# Patient Record
Sex: FEMALE | Race: WHITE | NOT HISPANIC OR LATINO | Employment: OTHER | URBAN - METROPOLITAN AREA
[De-identification: names, ages, dates, MRNs, and addresses within clinical notes are randomized per-mention and may not be internally consistent; named-entity substitution may affect disease eponyms.]

---

## 2019-03-30 ENCOUNTER — ANESTHESIA (OUTPATIENT)
Dept: EMERGENCY MEDICINE | Facility: CLINIC | Age: 84
DRG: 871 | End: 2019-03-30
Payer: COMMERCIAL

## 2019-03-30 ENCOUNTER — HOSPITAL ENCOUNTER (INPATIENT)
Facility: CLINIC | Age: 84
LOS: 6 days | Discharge: NURSING, REHAB OR LONG-TERM CARE | DRG: 871 | End: 2019-04-05
Attending: EMERGENCY MEDICINE | Admitting: PEDIATRICS
Payer: COMMERCIAL

## 2019-03-30 ENCOUNTER — ANESTHESIA EVENT (OUTPATIENT)
Dept: EMERGENCY MEDICINE | Facility: CLINIC | Age: 84
DRG: 871 | End: 2019-03-30
Payer: COMMERCIAL

## 2019-03-30 ENCOUNTER — APPOINTMENT (OUTPATIENT)
Dept: GENERAL RADIOLOGY | Facility: CLINIC | Age: 84
DRG: 871 | End: 2019-03-30
Attending: EMERGENCY MEDICINE
Payer: COMMERCIAL

## 2019-03-30 ENCOUNTER — APPOINTMENT (OUTPATIENT)
Dept: GENERAL RADIOLOGY | Facility: CLINIC | Age: 84
DRG: 871 | End: 2019-03-30
Attending: NURSE ANESTHETIST, CERTIFIED REGISTERED
Payer: COMMERCIAL

## 2019-03-30 DIAGNOSIS — I48.91 ATRIAL FIBRILLATION WITH RVR (H): ICD-10-CM

## 2019-03-30 DIAGNOSIS — K21.00 GASTROESOPHAGEAL REFLUX DISEASE WITH ESOPHAGITIS: ICD-10-CM

## 2019-03-30 DIAGNOSIS — J44.1 COPD EXACERBATION (H): ICD-10-CM

## 2019-03-30 DIAGNOSIS — E87.20 LACTIC ACID ACIDOSIS: ICD-10-CM

## 2019-03-30 DIAGNOSIS — I48.0 PAROXYSMAL ATRIAL FIBRILLATION (H): ICD-10-CM

## 2019-03-30 DIAGNOSIS — J96.01 ACUTE RESPIRATORY FAILURE WITH HYPOXIA (H): ICD-10-CM

## 2019-03-30 DIAGNOSIS — I48.20 CHRONIC ATRIAL FIBRILLATION (H): ICD-10-CM

## 2019-03-30 DIAGNOSIS — Z72.0 TOBACCO USE: ICD-10-CM

## 2019-03-30 DIAGNOSIS — R19.7 DIARRHEA, UNSPECIFIED TYPE: ICD-10-CM

## 2019-03-30 DIAGNOSIS — J18.9 PNEUMONIA OF BOTH LUNGS DUE TO INFECTIOUS ORGANISM, UNSPECIFIED PART OF LUNG: ICD-10-CM

## 2019-03-30 DIAGNOSIS — K92.2 GASTROINTESTINAL HEMORRHAGE, UNSPECIFIED GASTROINTESTINAL HEMORRHAGE TYPE: Primary | ICD-10-CM

## 2019-03-30 PROBLEM — N17.9 ACUTE KIDNEY INJURY (H): Status: ACTIVE | Noted: 2019-03-30

## 2019-03-30 PROBLEM — R82.81 PYURIA: Status: ACTIVE | Noted: 2019-03-30

## 2019-03-30 PROBLEM — G93.40 ACUTE ENCEPHALOPATHY: Status: ACTIVE | Noted: 2019-03-30

## 2019-03-30 PROBLEM — I95.9 HYPOTENSION: Status: ACTIVE | Noted: 2019-03-30

## 2019-03-30 PROBLEM — R05.9 COUGH: Status: ACTIVE | Noted: 2019-03-30

## 2019-03-30 PROBLEM — I24.89 DEMAND ISCHEMIA OF MYOCARDIUM (H): Status: ACTIVE | Noted: 2019-03-30

## 2019-03-30 PROBLEM — D69.6 THROMBOCYTOPENIA (H): Status: ACTIVE | Noted: 2019-03-30

## 2019-03-30 PROBLEM — R65.10 SIRS (SYSTEMIC INFLAMMATORY RESPONSE SYNDROME) (H): Status: ACTIVE | Noted: 2019-03-30

## 2019-03-30 LAB
ALBUMIN SERPL-MCNC: 4 G/DL (ref 3.4–5)
ALBUMIN UR-MCNC: 30 MG/DL
ALP SERPL-CCNC: 72 U/L (ref 40–150)
ALT SERPL W P-5'-P-CCNC: 29 U/L (ref 0–50)
ANION GAP SERPL CALCULATED.3IONS-SCNC: 11 MMOL/L (ref 3–14)
APPEARANCE UR: ABNORMAL
APTT PPP: 34 SEC (ref 22–37)
AST SERPL W P-5'-P-CCNC: 48 U/L (ref 0–45)
BASE DEFICIT BLDV-SCNC: 0.7 MMOL/L
BASE DEFICIT BLDV-SCNC: 3.5 MMOL/L
BASOPHILS # BLD AUTO: 0 10E9/L (ref 0–0.2)
BASOPHILS NFR BLD AUTO: 0.5 %
BILIRUB SERPL-MCNC: 0.9 MG/DL (ref 0.2–1.3)
BILIRUB UR QL STRIP: NEGATIVE
BUN SERPL-MCNC: 24 MG/DL (ref 7–30)
CALCIUM SERPL-MCNC: 8.8 MG/DL (ref 8.5–10.1)
CHLORIDE SERPL-SCNC: 102 MMOL/L (ref 94–109)
CO2 SERPL-SCNC: 23 MMOL/L (ref 20–32)
COLOR UR AUTO: YELLOW
CREAT SERPL-MCNC: 1.49 MG/DL (ref 0.52–1.04)
D DIMER PPP FEU-MCNC: 0.8 UG/ML FEU (ref 0–0.5)
DIFFERENTIAL METHOD BLD: ABNORMAL
EOSINOPHIL NFR BLD AUTO: 0.2 %
ERYTHROCYTE [DISTWIDTH] IN BLOOD BY AUTOMATED COUNT: 14.5 % (ref 10–15)
FLUAV+FLUBV AG SPEC QL: NEGATIVE
FLUAV+FLUBV AG SPEC QL: NEGATIVE
GFR SERPL CREATININE-BSD FRML MDRD: 32 ML/MIN/{1.73_M2}
GLUCOSE BLDC GLUCOMTR-MCNC: 146 MG/DL (ref 70–99)
GLUCOSE BLDC GLUCOMTR-MCNC: 90 MG/DL (ref 70–99)
GLUCOSE SERPL-MCNC: 129 MG/DL (ref 70–99)
GLUCOSE UR STRIP-MCNC: NEGATIVE MG/DL
GRAN CASTS #/AREA URNS LPF: 15 /LPF
HCO3 BLDV-SCNC: 23 MMOL/L (ref 21–28)
HCO3 BLDV-SCNC: 25 MMOL/L (ref 21–28)
HCT VFR BLD AUTO: 44.2 % (ref 35–47)
HGB BLD-MCNC: 14.5 G/DL (ref 11.7–15.7)
HGB UR QL STRIP: ABNORMAL
HYALINE CASTS #/AREA URNS LPF: 44 /LPF (ref 0–2)
IMM GRANULOCYTES # BLD: 0 10E9/L (ref 0–0.4)
IMM GRANULOCYTES NFR BLD: 0.6 %
INR PPP: 1.14 (ref 0.86–1.14)
KETONES UR STRIP-MCNC: 5 MG/DL
LACTATE BLD-SCNC: 1.8 MMOL/L (ref 0.7–2)
LACTATE BLD-SCNC: 3.2 MMOL/L (ref 0.7–2)
LACTATE BLD-SCNC: 3.7 MMOL/L (ref 0.7–2)
LEUKOCYTE ESTERASE UR QL STRIP: ABNORMAL
LYMPHOCYTES # BLD AUTO: 1 10E9/L (ref 0.8–5.3)
LYMPHOCYTES NFR BLD AUTO: 16 %
MAGNESIUM SERPL-MCNC: 2 MG/DL (ref 1.6–2.3)
MCH RBC QN AUTO: 27.7 PG (ref 26.5–33)
MCHC RBC AUTO-ENTMCNC: 32.8 G/DL (ref 31.5–36.5)
MCV RBC AUTO: 85 FL (ref 78–100)
MONOCYTES # BLD AUTO: 1 10E9/L (ref 0–1.3)
MONOCYTES NFR BLD AUTO: 15.2 %
MUCOUS THREADS #/AREA URNS LPF: PRESENT /LPF
NEUTROPHILS # BLD AUTO: 4.3 10E9/L (ref 1.6–8.3)
NEUTROPHILS NFR BLD AUTO: 67.5 %
NITRATE UR QL: NEGATIVE
NRBC # BLD AUTO: 0 10*3/UL
NRBC BLD AUTO-RTO: 0 /100
NT-PROBNP SERPL-MCNC: 1047 PG/ML (ref 0–1800)
O2/TOTAL GAS SETTING VFR VENT: 21 %
O2/TOTAL GAS SETTING VFR VENT: 32 %
PCO2 BLDV: 46 MM HG (ref 40–50)
PCO2 BLDV: 47 MM HG (ref 40–50)
PH BLDV: 7.3 PH (ref 7.32–7.43)
PH BLDV: 7.35 PH (ref 7.32–7.43)
PH UR STRIP: 6 PH (ref 5–7)
PLATELET # BLD AUTO: 130 10E9/L (ref 150–450)
PO2 BLDV: 20 MM HG (ref 25–47)
PO2 BLDV: 33 MM HG (ref 25–47)
POTASSIUM SERPL-SCNC: 4.7 MMOL/L (ref 3.4–5.3)
PROCALCITONIN SERPL-MCNC: <0.05 NG/ML
PROT SERPL-MCNC: 7.8 G/DL (ref 6.8–8.8)
RBC # BLD AUTO: 5.23 10E12/L (ref 3.8–5.2)
RBC #/AREA URNS AUTO: 12 /HPF (ref 0–2)
SODIUM SERPL-SCNC: 136 MMOL/L (ref 133–144)
SOURCE: ABNORMAL
SP GR UR STRIP: 1.01 (ref 1–1.03)
SPECIMEN SOURCE: NORMAL
SQUAMOUS #/AREA URNS AUTO: 4 /HPF (ref 0–1)
T4 FREE SERPL-MCNC: 1.25 NG/DL (ref 0.76–1.46)
TROPONIN I SERPL-MCNC: 0.09 UG/L (ref 0–0.04)
TROPONIN I SERPL-MCNC: 0.46 UG/L (ref 0–0.04)
TSH SERPL DL<=0.005 MIU/L-ACNC: 12.69 MU/L (ref 0.4–4)
UROBILINOGEN UR STRIP-MCNC: 0 MG/DL (ref 0–2)
WBC # BLD AUTO: 6.3 10E9/L (ref 4–11)
WBC #/AREA URNS AUTO: 24 /HPF (ref 0–5)

## 2019-03-30 PROCEDURE — 84439 ASSAY OF FREE THYROXINE: CPT | Performed by: EMERGENCY MEDICINE

## 2019-03-30 PROCEDURE — 71046 X-RAY EXAM CHEST 2 VIEWS: CPT | Mod: TC

## 2019-03-30 PROCEDURE — 84484 ASSAY OF TROPONIN QUANT: CPT | Performed by: EMERGENCY MEDICINE

## 2019-03-30 PROCEDURE — 25000128 H RX IP 250 OP 636: Performed by: PEDIATRICS

## 2019-03-30 PROCEDURE — 85025 COMPLETE CBC W/AUTO DIFF WBC: CPT | Performed by: EMERGENCY MEDICINE

## 2019-03-30 PROCEDURE — 96367 TX/PROPH/DG ADDL SEQ IV INF: CPT

## 2019-03-30 PROCEDURE — 83880 ASSAY OF NATRIURETIC PEPTIDE: CPT | Performed by: PEDIATRICS

## 2019-03-30 PROCEDURE — 84484 ASSAY OF TROPONIN QUANT: CPT | Performed by: PEDIATRICS

## 2019-03-30 PROCEDURE — 25000132 ZZH RX MED GY IP 250 OP 250 PS 637: Performed by: FAMILY MEDICINE

## 2019-03-30 PROCEDURE — 94640 AIRWAY INHALATION TREATMENT: CPT

## 2019-03-30 PROCEDURE — 93010 ELECTROCARDIOGRAM REPORT: CPT | Mod: Z6 | Performed by: EMERGENCY MEDICINE

## 2019-03-30 PROCEDURE — 85730 THROMBOPLASTIN TIME PARTIAL: CPT | Performed by: PEDIATRICS

## 2019-03-30 PROCEDURE — 93005 ELECTROCARDIOGRAM TRACING: CPT

## 2019-03-30 PROCEDURE — 82803 BLOOD GASES ANY COMBINATION: CPT | Performed by: PEDIATRICS

## 2019-03-30 PROCEDURE — 83605 ASSAY OF LACTIC ACID: CPT | Performed by: EMERGENCY MEDICINE

## 2019-03-30 PROCEDURE — 87086 URINE CULTURE/COLONY COUNT: CPT | Performed by: EMERGENCY MEDICINE

## 2019-03-30 PROCEDURE — 82803 BLOOD GASES ANY COMBINATION: CPT | Performed by: EMERGENCY MEDICINE

## 2019-03-30 PROCEDURE — 87804 INFLUENZA ASSAY W/OPTIC: CPT | Performed by: EMERGENCY MEDICINE

## 2019-03-30 PROCEDURE — 85610 PROTHROMBIN TIME: CPT | Performed by: PEDIATRICS

## 2019-03-30 PROCEDURE — 3E043XZ INTRODUCTION OF VASOPRESSOR INTO CENTRAL VEIN, PERCUTANEOUS APPROACH: ICD-10-PCS | Performed by: PEDIATRICS

## 2019-03-30 PROCEDURE — 87081 CULTURE SCREEN ONLY: CPT | Performed by: PEDIATRICS

## 2019-03-30 PROCEDURE — 83605 ASSAY OF LACTIC ACID: CPT | Performed by: PEDIATRICS

## 2019-03-30 PROCEDURE — 96366 THER/PROPH/DIAG IV INF ADDON: CPT

## 2019-03-30 PROCEDURE — 96365 THER/PROPH/DIAG IV INF INIT: CPT

## 2019-03-30 PROCEDURE — 25800030 ZZH RX IP 258 OP 636: Performed by: PEDIATRICS

## 2019-03-30 PROCEDURE — 40000986 XR CHEST 1 VW

## 2019-03-30 PROCEDURE — 25800030 ZZH RX IP 258 OP 636: Performed by: EMERGENCY MEDICINE

## 2019-03-30 PROCEDURE — 85379 FIBRIN DEGRADATION QUANT: CPT | Performed by: PEDIATRICS

## 2019-03-30 PROCEDURE — 40000809 ZZH STATISTIC NO DOCUMENTATION TO SUPPORT CHARGE

## 2019-03-30 PROCEDURE — 20000003 ZZH R&B ICU

## 2019-03-30 PROCEDURE — 96375 TX/PRO/DX INJ NEW DRUG ADDON: CPT

## 2019-03-30 PROCEDURE — 99223 1ST HOSP IP/OBS HIGH 75: CPT | Mod: AI | Performed by: PEDIATRICS

## 2019-03-30 PROCEDURE — 80053 COMPREHEN METABOLIC PANEL: CPT | Performed by: EMERGENCY MEDICINE

## 2019-03-30 PROCEDURE — 00000146 ZZHCL STATISTIC GLUCOSE BY METER IP

## 2019-03-30 PROCEDURE — 25000125 ZZHC RX 250: Performed by: PEDIATRICS

## 2019-03-30 PROCEDURE — 99292 CRITICAL CARE ADDL 30 MIN: CPT

## 2019-03-30 PROCEDURE — 96361 HYDRATE IV INFUSION ADD-ON: CPT

## 2019-03-30 PROCEDURE — 25000128 H RX IP 250 OP 636: Performed by: EMERGENCY MEDICINE

## 2019-03-30 PROCEDURE — 83735 ASSAY OF MAGNESIUM: CPT | Performed by: EMERGENCY MEDICINE

## 2019-03-30 PROCEDURE — 87040 BLOOD CULTURE FOR BACTERIA: CPT | Performed by: EMERGENCY MEDICINE

## 2019-03-30 PROCEDURE — 40000671 ZZH STATISTIC ANESTHESIA CASE

## 2019-03-30 PROCEDURE — 84145 PROCALCITONIN (PCT): CPT | Performed by: EMERGENCY MEDICINE

## 2019-03-30 PROCEDURE — 99291 CRITICAL CARE FIRST HOUR: CPT | Mod: 25

## 2019-03-30 PROCEDURE — 96376 TX/PRO/DX INJ SAME DRUG ADON: CPT

## 2019-03-30 PROCEDURE — 84443 ASSAY THYROID STIM HORMONE: CPT | Performed by: EMERGENCY MEDICINE

## 2019-03-30 PROCEDURE — 25000125 ZZHC RX 250: Performed by: EMERGENCY MEDICINE

## 2019-03-30 PROCEDURE — 99291 CRITICAL CARE FIRST HOUR: CPT | Mod: 25 | Performed by: EMERGENCY MEDICINE

## 2019-03-30 PROCEDURE — 81001 URINALYSIS AUTO W/SCOPE: CPT | Performed by: EMERGENCY MEDICINE

## 2019-03-30 RX ORDER — SODIUM CHLORIDE 9 MG/ML
1000 INJECTION, SOLUTION INTRAVENOUS CONTINUOUS
Status: DISCONTINUED | OUTPATIENT
Start: 2019-03-30 | End: 2019-03-30

## 2019-03-30 RX ORDER — SODIUM CHLORIDE, SODIUM LACTATE, POTASSIUM CHLORIDE, CALCIUM CHLORIDE 600; 310; 30; 20 MG/100ML; MG/100ML; MG/100ML; MG/100ML
INJECTION, SOLUTION INTRAVENOUS CONTINUOUS
Status: DISCONTINUED | OUTPATIENT
Start: 2019-03-30 | End: 2019-04-01

## 2019-03-30 RX ORDER — IPRATROPIUM BROMIDE AND ALBUTEROL SULFATE 2.5; .5 MG/3ML; MG/3ML
3 SOLUTION RESPIRATORY (INHALATION) ONCE
Status: COMPLETED | OUTPATIENT
Start: 2019-03-30 | End: 2019-03-30

## 2019-03-30 RX ORDER — DILTIAZEM HYDROCHLORIDE 5 MG/ML
10 INJECTION INTRAVENOUS ONCE
Status: COMPLETED | OUTPATIENT
Start: 2019-03-30 | End: 2019-03-30

## 2019-03-30 RX ORDER — DIGOXIN 0.25 MG/ML
500 INJECTION INTRAMUSCULAR; INTRAVENOUS ONCE
Status: COMPLETED | OUTPATIENT
Start: 2019-03-30 | End: 2019-03-30

## 2019-03-30 RX ADMIN — TAZOBACTAM SODIUM AND PIPERACILLIN SODIUM 3.38 G: 375; 3 INJECTION, SOLUTION INTRAVENOUS at 10:41

## 2019-03-30 RX ADMIN — Medication 1 LOZENGE: at 22:45

## 2019-03-30 RX ADMIN — SODIUM CHLORIDE, POTASSIUM CHLORIDE, SODIUM LACTATE AND CALCIUM CHLORIDE 1000 ML: 600; 310; 30; 20 INJECTION, SOLUTION INTRAVENOUS at 10:40

## 2019-03-30 RX ADMIN — VANCOMYCIN HYDROCHLORIDE 1500 MG: 1 INJECTION, POWDER, LYOPHILIZED, FOR SOLUTION INTRAVENOUS at 11:27

## 2019-03-30 RX ADMIN — SODIUM CHLORIDE, POTASSIUM CHLORIDE, SODIUM LACTATE AND CALCIUM CHLORIDE: 600; 310; 30; 20 INJECTION, SOLUTION INTRAVENOUS at 15:58

## 2019-03-30 RX ADMIN — SODIUM CHLORIDE, POTASSIUM CHLORIDE, SODIUM LACTATE AND CALCIUM CHLORIDE: 600; 310; 30; 20 INJECTION, SOLUTION INTRAVENOUS at 13:08

## 2019-03-30 RX ADMIN — SODIUM CHLORIDE 1000 ML: 9 INJECTION, SOLUTION INTRAVENOUS at 09:26

## 2019-03-30 RX ADMIN — DILTIAZEM HYDROCHLORIDE 5 MG/HR: 5 INJECTION INTRAVENOUS at 09:48

## 2019-03-30 RX ADMIN — DIGOXIN 500 MCG: 0.25 INJECTION INTRAMUSCULAR; INTRAVENOUS at 15:57

## 2019-03-30 RX ADMIN — DILTIAZEM HYDROCHLORIDE 10 MG: 5 INJECTION INTRAVENOUS at 09:41

## 2019-03-30 RX ADMIN — NOREPINEPHRINE BITARTRATE 0.03 MCG/KG/MIN: 1 INJECTION INTRAVENOUS at 21:04

## 2019-03-30 RX ADMIN — IPRATROPIUM BROMIDE AND ALBUTEROL SULFATE 3 ML: .5; 3 SOLUTION RESPIRATORY (INHALATION) at 09:51

## 2019-03-30 RX ADMIN — TAZOBACTAM SODIUM AND PIPERACILLIN SODIUM 3.38 G: 375; 3 INJECTION, SOLUTION INTRAVENOUS at 17:29

## 2019-03-30 RX ADMIN — Medication 1 LOZENGE: at 21:55

## 2019-03-30 RX ADMIN — TAZOBACTAM SODIUM AND PIPERACILLIN SODIUM 3.38 G: 375; 3 INJECTION, SOLUTION INTRAVENOUS at 22:45

## 2019-03-30 RX ADMIN — SODIUM CHLORIDE, POTASSIUM CHLORIDE, SODIUM LACTATE AND CALCIUM CHLORIDE 1000 ML: 600; 310; 30; 20 INJECTION, SOLUTION INTRAVENOUS at 11:51

## 2019-03-30 ASSESSMENT — COPD QUESTIONNAIRES: COPD: 1

## 2019-03-30 ASSESSMENT — ENCOUNTER SYMPTOMS
CONSTIPATION: 0
WHEEZING: 1
DECREASED CONCENTRATION: 1
HEADACHES: 0
FATIGUE: 1
SHORTNESS OF BREATH: 1
NAUSEA: 0
APPETITE CHANGE: 1
POLYDIPSIA: 0
ABDOMINAL PAIN: 0
WEAKNESS: 1
FEVER: 0
VOMITING: 0
CHILLS: 1
ACTIVITY CHANGE: 1
PALPITATIONS: 0
MYALGIAS: 1
LIGHT-HEADEDNESS: 1
EYES NEGATIVE: 1
COUGH: 1
DIARRHEA: 0

## 2019-03-30 ASSESSMENT — MIFFLIN-ST. JEOR: SCORE: 1208.75

## 2019-03-30 ASSESSMENT — LIFESTYLE VARIABLES: TOBACCO_USE: 1

## 2019-03-30 ASSESSMENT — ACTIVITIES OF DAILY LIVING (ADL)
ADLS_ACUITY_SCORE: 14
ADLS_ACUITY_SCORE: 14

## 2019-03-30 NOTE — ANESTHESIA CARE TRANSFER NOTE
Patient: Gustavo Palmer    * No procedures listed *    Diagnosis: * No pre-op diagnosis entered *  Diagnosis Additional Information: No value filed.    Anesthesia Type:   No value filed.     Note:  Airway :Nasal Cannula  Patient transferred to:ICU  ICU Handoff: Call for PAUSE to initiate/utilize ICU HANDOFF, Identified Patient, Identified Responsible Provider, Reviewed the Pertinent Medical History, Discussed Surgical Course, Reviewed Intra-OP Anesthesia Management and Issues during Anesthesia, Set Expectations for Post Procedure Period and Allowed Opportunity for Questions and Acknowledgement of Understanding      Vitals: (Last set prior to Anesthesia Care Transfer)              Electronically Signed By: KRISTEN Puentes CRNA  March 30, 2019  4:12 PM

## 2019-03-30 NOTE — ANESTHESIA POSTPROCEDURE EVALUATION
Patient: Gustavo Palmer    * No procedures listed *    Diagnosis:* No pre-op diagnosis entered *  Diagnosis Additional Information: No value filed.    Anesthesia Type:  No value filed.    Note:  Anesthesia Post Evaluation    Patient location during evaluation: ICU  Patient participation: Able to fully participate in evaluation  Level of consciousness: awake and alert  Pain management: satisfactory to patient  Airway patency: patent  Cardiovascular status: stable  Respiratory status: nasal cannula  Hydration status: stable  PONV: none     Anesthetic complications: None    Comments: Appear to have tolerated Central line placement well without noted incident.        Last vitals:  Vitals:    03/30/19 1530 03/30/19 1545 03/30/19 1600   BP: 107/63 117/76    Pulse: 107 135 138   Resp: 24 29 24   Temp:   99.1  F (37.3  C)   SpO2:            Electronically Signed By: KRISTEN Puentes CRNA  March 30, 2019  4:13 PM

## 2019-03-30 NOTE — ED NOTES
ED Nursing criteria listed below was addressed during verbal handoff:     Abnormal vitals: Yes  Abnormal results: Yes  Med Reconciliation completed: Yes  Meds given in ED: Yes  Any Overdue Meds: No  Core Measures: Yes  Isolation: Yes  Special needs: Yes  Skin assessment: Yes    Observation Patient  Education provided: N/A

## 2019-03-30 NOTE — PROGRESS NOTES
S: Patient arrives to room 215 via cart from ED    B: Rapid atrial fibrillation, SIRS    A: Alert and oriented, cooperative. Monitor shows A Fib, -140, HR decreases to SR 50-60s at times then back into A Fib. MD aware. Skin warm and dry. BP stable, Levophed not started at this time, MAP > 65.     R: Orders reviewed with patient. Will monitor patient per MD orders.     Inpatient nursing criteria listed below were met:    Health care directives status obtained and documented: Yes  SCD's Documented: Yes  Skin issues/needs documented:Yes  Isolation needs addressed, if appropriate: Yes  Fall Prevention: Care plan updated, Education given and documented Yes  MRSA swab completed for patient 55 years and older (exclude POLLY and TKA): Yes  Care Plan initiated: Yes  Education Assessment documented:Yes  Education Documented (Pre-existing chronic infection such as, MRSA/VRE need education on admission): Yes  Admission Medication Reconciliation completed: Yes  New medication patient education completed and documented (Possible Side Effects of Common Medications handout): Yes  Home medications if not able to send immediately home with family stored here: NA  Reminder note placed in discharge instructions: NA  Discharge planning review completed (admission navigator) Yes

## 2019-03-30 NOTE — ED NOTES
Pt boosted and turned onto side. Pt states she is feeling a little more comfortable and better. No requests at this time.

## 2019-03-30 NOTE — ED NOTES
DATE:  3/30/2019   TIME OF RECEIPT FROM LAB:  0955  LAB TEST:  Lactic Acid  LAB VALUE:  3.7  RESULTS GIVEN WITH READ-BACK TO (PROVIDER):  Provider and primary RN  TIME LAB VALUE REPORTED TO PROVIDER:   0956

## 2019-03-30 NOTE — SIGNIFICANT EVENT
Significant Event Note    Time of event: 6:15 PM March 30, 2019    Description of event:  Notified of elevated troponin at 0.459, increased from 0.092 at 9:27 AM today.    Patient has been admitted to the ICU and had a central venous catheter placed.  Her hemodynamic status has improved maintaining normal blood pressures without having started vasopressor therapy.  CVP reading is pending.  She has not had any anginal pain.  Repeat EKG this afternoon demonstrates persistent atrial fibrillation with improved rate control and resolved ST segment depression.  Her respiratory status and oxygenation are stable on supplemental oxygen.  Rate of atrial fibrillation has improved with few episodes of brief sinus rhythm transiently through the afternoon after she was administered 1 dose IV digoxin earlier.  BNP is pending.  D-dimer was minimally elevated with low clinical suspicion for pulmonary embolism.    Suspect rising troponin is due to demand myocardial ischemia associated with a combination of atrial fibrillation with rapid ventricular response, severe hypoxemia, and protracted hypotension earlier today.  An acute coronary syndrome is not suspected at this time.    Plan:  Monitor serial troponin until decreasing.  No change in care plan at this time unless there is a change in her clinical status.  Reconsider additional diagnostic evaluation for pulmonary embolism if there is increasing clinical suspicion for such, but favor avoiding use of IV contrast at this time because of her impaired renal function and protracted hypotension earlier today.  Reconsider starting IV amiodarone to optimize rate control of rapid atrial fibrillation if atrial fibrillation is worsening.  Reconsider starting appropriate therapy for an acute coronary syndrome if there is increasing clinical suspicion for such.    Discussed with: bedside nurse    Marc Adan MD

## 2019-03-30 NOTE — PROGRESS NOTES
DATE: 3/30/2019    TIME OF RECEIPT FROM LAB:  1815  LAB TEST:  Troponin   LAB VALUE:  0.459  RESULTS GIVEN WITH READ-BACK TO:  Dr. Adan  TIME LAB VALUE REPORTED TO PROVIDER:   9117  NEW ORDERS: No

## 2019-03-30 NOTE — H&P
[facility]    History and Physical - Hospitalist Service       Date of Admission:  3/30/2019    Assessment & Plan   Gustavo Palmer is a 85 year old female admitted on 3/30/2019. She presents with symptoms, signs, and test results consistent with life-threatening acute illness with systemic inflammatory response syndrome and rapid atrial fibrillation.  She is persistently hypotensive after fluid resuscitation with 40 mL/kg in the emergency room with signs of tissue hypoxia including elevated lactic acid level and end organ dysfunction including acute respiratory failure with hypoxia, acute kidney injury, acute encephalopathy, and myocardial demand ischemia.  This raises suspicion for shock.  Although a source for infection is not currently identified with certainty, she has had recent cough and has pyuria on UA raising clinical concern for sepsis due either to respiratory tract infection or urinary tract infection.  Alternatively, rapid atrial fibrillation with cardiogenic shock is a consideration although hypotension persists despite improved heart rate.  She is at high risk for an adverse outcome including worsening signs of shock and death, so hospitalization is considered medically necessary.  Based on the presently available information, hospitalization for at least 2 midnights is anticipated.    Active Problems:    Rapid atrial fibrillation (H)  Assessment: Presented with initial heart rate 170-180 well-appearing hypovolemic, heart rate improved after ER treatment including IV diltiazem bolus and infusion and IV fluid resuscitation, remains severely hypotensive even as heart rate has improved with hypotension that did not resolve after discontinuation of IV diltiazem infusion, could have associated cardiogenic shock or non-ST elevation MI although she does not have anginal pain, could also have associated acute pulmonary edema although this was not radiographically evident, no  previous history of atrial fibrillation, DBIDC-0-xphf score is 3  Plan:  ICU admission, administer 1 dose IV digoxin, consider IV amiodarone bolus and infusion if rapid atrial fibrillation persists and she remains hypotensive, reconsider other medications for rate control once she stabilizes hemodynamically, echocardiogram April 1 when available at this facility, reconsider anticoagulation once she is more stable clinically      SIRS (systemic inflammatory response syndrome) (H) - tachycardia, tachypnea, elevated lactate  Assessment: Recent chills and presenting with tachycardia, tachypnea and persistent hypotension but afebrile so far with normal WBC, lactic acid elevated, no definite source for infection but is presenting with cough and is noted to have pyuria on UA  Plan: ICU admission, empiric broad-spectrum parenteral antibiotics with IV vancomycin and Zosyn for possible infection while awaiting culture results and pro-calcitonin, continue IV fluids, place central venous catheter as discussed with CRNA today, start vasopressors for treatment of persistent hypotension refractory to initial IV fluid resuscitation 40 mL/kg in the ER and titrate to keep mean arterial pressure greater than 65      Cough  Assessment: Persistent over the last week with associated dyspnea, no radiographic infiltrate apparent but is probably hypovolemic, pneumonia is possible but she is also an active smoker and could have cough from smoking or bronchitis  Plan: Empiric antibiotics      Pyuria  Assessment: No recent symptoms suspicious for urinary tract infection but does appear to have significant pyuria and in the context of Sirs with persistent hypotension, UTI with sepsis is possible  Plan: Empiric parenteral antibiotics while awaiting urine culture results      Acute kidney injury (H)  Assessment: Creatinine 1.49 today, increased from 0.81 in 2016 and consistent with acute kidney injury, could be due to prerenal azotemia but she  could also have acute kidney injury from sepsis  Plan: IV fluids, follow urine output and renal function closely, allow to void spontaneously for now but low threshold to Place Ortiz catheter to monitor urine output closely      Acute encephalopathy  Assessment: Presented to the ER with confusion today and continues to have fluctuating level of consciousness consistent with an acute encephalopathy in this clinical context, no focal neurologic deficits so doubt stroke, no seizure activity  Plan: Close clinical monitoring, additional neurologic evaluation if indicated      Acidosis  Assessment: Mildly low venous pH is 7.30 with normal bicarbonate and normal PCO2 on blood gas, could be a relative early respiratory acidosis associated with respiratory failure or metabolic acidosis associated with tissue hypoxia  Plan: Provide respiratory support and hemodynamic support to improve tissue perfusion and follow acid base status closely      Acute respiratory failure with hypoxia (H)  Assessment: Severely hypoxic with respiratory distress consistent with acute respiratory failure, possibly due to respiratory tract infection, not clinically demonstrating significant wheezing at this time, could also be due to systemic inflammatory response syndrome and sepsis  Plan: Oxygen supplementation with low threshold to add assisted ventilation if worsening      Demand ischemia of myocardium (H)  Assessment: Slightly elevated troponin with ST segment depression on EKG consistent with myocardial ischemia although she has no anginal pain nor does she have any history of ischemic heart disease, suspect demand ischemia due to severe acute illness with tissue hypoxia and severe hypotension  Plan: Follow troponin until stabilizing, add additional therapy for treatment of an acute coronary syndrome if there is increasing clinical suspicion for it      Tobacco use  Assessment: Chronic and ongoing, increases her risk for COPD although she does  not carry a formal diagnosis of same  Plan: Bronchodilators as needed for wheezing      Thrombocytopenia (H)  Assessment: Chronic and currently stable compared with 2016, no apparent bleeding  Plan: Follow platelet count       Diet: Clear liquid diet for now  DVT Prophylaxis: Pneumatic Compression Devices  Ortiz Catheter: not present  Code Status: Full resuscitation according to advance directives, not specifically reviewed with the patient herself today due to her abnormal mentation    Disposition Plan   Expected discharge: 4 - 7 days, recommended to To be determined once SIRS/Sepsis treated.  Entered: Marc Adan MD 03/30/2019, 1:51 PM     The patient's care was discussed with the Patient.    Marc Adan MD  [facility]    ______________________________________________________________________    Chief Complaint   Worsening shortness of breath    History is obtained from the patient and the ER physician Dr. Bartlett    History of Present Illness   Gustavo Palmer is a 85 year old female who noted onset of cough and shortness of breath 1 week ago on March 24.  Cough has been intermittently productive of clear phlegm.  Shortness of breath was initially mild.  Over the past week, cough has persisted and shortness of breath has progressively worsened.  Shortness of breath is worse with activity and improves with rest.  Aside from resting, there is nothing else specific she has tried to relieve symptoms of dyspnea.  She has had associated chills at home but is not sure if she has had fever.  Dyspnea became so severe today that she presented to the emergency room for evaluation.  In the emergency room, she was noted to be hypoxemic and started on oxygen.  She was also noted to be tachycardic with a regular rhythm and was given a dose of 10 mg IV diltiazem followed by continuous IV diltiazem titrated to 7.5 mg/h.  She was hypotensive and has received a total of 40 mL's per kilogram IV  fluid resuscitation in the ER.  According to Dr. Bartlett in the ER, she has been persistently hypotensive despite fluid resuscitation.  Her diltiazem infusion was discontinued when her heart rate improved to 110-120 but her blood pressure failed to improve after discontinuation of diltiazem infusion.  Dr. Bartlett reports that the patient was confused when she arrived to the ER today and that she did not appear well perfused and appeared ill but that she has since improved.  Patient says she does feel slightly better after treatment she has received in the ER.    Review of Systems    CONSTITUTIONAL:  positive for chills and malaise  negative for  fevers and sweats  EYES:  negative for  eye discharge and visual disturbance  HEENT:  positive for  nasal congestion and rhinorrhea,  negative for  sore throat  RESPIRATORY:  positive for  cough with sputum and dyspnea  negative for  pleuritic pain and cyanosis  CARDIOVASCULAR:  negative for  chest pain, palpitations, orthopnea, PND, edema, syncope  GASTROINTESTINAL:  positive for markedly decreased appetite at home this week,  negative for nausea, vomiting, change in bowel habits and abdominal pain  GENITOURINARY:  negative for frequency, dysuria, hesitancy and hematuria  INTEGUMENT/BREAST:  negative for rash  HEMATOLOGIC/LYMPHATIC:  negative for bleeding  ALLERGIC/IMMUNOLOGIC:  negative for drug reactions  ENDOCRINE:  negative for diabetic symptoms including polyuria and polydipsia  MUSCULOSKELETAL:  negative for  myalgias, arthralgias and joint swelling  NEUROLOGICAL:  positive for dizziness with getting up and generalized weakness  negative for speech problems and coordination problems    Past Medical History    I have reviewed this patient's medical history and updated it with pertinent information if needed.   Past Medical History:   Diagnosis Date     Unspecified hemorrhoids without mention of complication     Hemorrhoids     Unspecified nonsenile cataract       Patient denies any history of heart disease including dysrhythmia, ischemic heart disease, or heart failure.    She reports that she had an episode of pneumonia years ago.  She has not had recurring UTIs.    Past Surgical History   I have reviewed this patient's surgical history and updated it with pertinent information if needed.  Past Surgical History:   Procedure Laterality Date     C TOTAL HIP ARTHROPLASTY  01/26/10    Right     HC REMV CATARACT EXTRACAP,INSERT LENS  2/17/2004    Right     HC REMV CATARACT EXTRACAP,INSERT LENS  3/16/2004    Left       Social History   I have reviewed this patient's social history and updated it with pertinent information if needed.  Social History     Tobacco Use     Smoking status: Current Every Day Smoker     Packs/day: 1.00     Years: 40.00     Pack years: 40.00     Smokeless tobacco: Never Used   Substance Use Topics     Alcohol use: Yes     Comment: occ wine     Drug use: No     Her daughter was diagnosed with influenza A today.    Family History   I have reviewed this patient's family history and updated it with pertinent information if needed.   Family History   Problem Relation Age of Onset     Diabetes Brother         x2 brothers     Cancer Mother         lung cancer       Prior to Admission Medications   None   She takes no prescription medications normally.      Allergies   Allergies   Allergen Reactions     No Known Drug Allergies        Physical Exam   Vital Signs: Temp: 98.2  F (36.8  C) Temp src: Oral BP: (!) 79/57 Pulse: 118 Heart Rate: 140 Resp: 20 SpO2: 94 % O2 Device: Nasal cannula Oxygen Delivery: 2 LPM  Weight: 165 lbs 0 oz     Blood pressure changes from 77/47 with mean arterial pressure 58 to 94/60 with mean arterial pressure 78 with passive elevation of the legs to above with heart level while she is supine in bed but heart rate actually increases from 109 to 140 after elevation of her legs    General Appearance: Ill-appearing elderly woman lying  supine in bed  Eyes: Anicteric sclerae, clear conjunctivae, symmetric pupils  HEENT: Congested nares without obvious drainage, clear ear canals, normal oropharynx without mucosal lesions  Neck: Trachea midline, symmetric, nontender, no jugular vein distention, no stridor, normal thyroid  Respiratory: Borderline tachypneic with otherwise normal respiratory effort, able to speak in full sentences, diminished breath sounds throughout, clear lung fields currently without wheezing  Cardiovascular: Tachycardic with irregularly irregular rhythm, palpable radial pulse with brisk capillary refill in the fingertips, no murmur or gallop appreciated  GI: Nondistended abdomen, soft, no abdominal tenderness  Lymph/Hematologic: No cervical or supraclavicular lymphadenopathy  Skin: Normal color, no rashes  Musculoskeletal: No acutely inflamed joints, no angioedema, no pitting edema or cords in the lower extremities  Neurologic: Fluctuating level of consciousness alternating between somnolence and wakefulness, opens eyes to voice, speech is normal when she is awake, no obvious focal neurologic deficits, no apparent involuntary movements, able to move all limbs purposefully and symmetrically  Psychiatric: Normal mood and affect when alert    Data   Data reviewed today: I reviewed all medications, new labs and imaging results over the last 24 hours. I personally reviewed the EKG tracing showing Probable atrial fibrillation with rapid ventricular response heart rate approximately 180, diffuse ST segment depression.    Recent Labs   Lab 03/30/19  0927   WBC 6.3   HGB 14.5   MCV 85   *      POTASSIUM 4.7   CHLORIDE 102   CO2 23   BUN 24   CR 1.49*   ANIONGAP 11   TONY 8.8   *   ALBUMIN 4.0   PROTTOTAL 7.8   BILITOTAL 0.9   ALKPHOS 72   ALT 29   AST 48*   TROPI 0.092*     Initial lactic acid 3.7 in the ER with repeat lactic acid 3.2 after fluid resuscitation  Venous blood gas pH 7.30, PCO2 47, bicarbonate 23  Troponin 0  0.092    Urinalysis positive for moderate blood, small ketones, and moderate leukocyte esterase with 24 WBC, 12 RBC, and 15 granular casts on microscopic    Blood and urine cultures are pending, pro calcitonin level is pending      Recent Results (from the past 24 hour(s))   XR Chest 2 Views    Narrative    CHEST TWO VIEWS    3/30/2019 10:16 AM     HISTORY: Productive cough, sepsis workup    COMPARISON: Chest CT 8/5/2016, chest x-ray 8/5/2016      Impression    IMPRESSION: No acute abnormality. Lungs are hyperinflated, but clear.  No new opacities are identified. Heart size is unchanged. Thoracic  aorta is tortuous, unchanged.    MCKAY FARRELL MD          VDOFH-9-Pyry Score  (calculator)  Background  Calculates overall risk of atrial fibrillation related CVA based on 7 factors: Age>=65-75, CHF, Htn, CVA/TIA, DM, vascular disease, female  Data  85 year old year old female  has Primary localized osteoarthrosis, pelvic region and thigh; CARDIOVASCULAR SCREENING; LDL GOAL LESS THAN 130; Impaired fasting glucose; Advanced directives, counseling/discussion; Health Care Home; Rapid atrial fibrillation (H); SIRS (systemic inflammatory response syndrome) (H) - tachycardia, tachypnea, elevated lactate; Cough; Pyuria; Tobacco use; Acute kidney injury (H); Acute encephalopathy; Acidosis; Thrombocytopenia (H); Acute respiratory failure with hypoxia (H); and Demand ischemia of myocardium (H) on their problem list.  Criteria   Of possible 6 points for 5 possible items  2 point: Age over 75 years (1 point over 65 years)  1 point: Female    Interpretation  MFCQZ-7-Eylp Score: 3  CHADS Score 2+ (CVA risk >5% per year): Warfarin with goal INR 2.0 to 3.0

## 2019-03-30 NOTE — ANESTHESIA PROCEDURE NOTES
CENTRAL LINE INSERTION PROCEDURE NOTE:   Pre-Procedure  Performed by  Stephan Kaufman APRN CRNA   Location: ICU      Pre-Anesthestic Checklist: patient identified, risks and benefits discussed, informed consent, monitors and equipment checked, pre-op evaluation and at physician/surgeon's request    Timeout  Correct Patient: Yes   Correct Procedure: Yes   Correct Site: Yes   Correct Laterality: N/A   Correct Position: Yes   Site Marked: N/A   .   Procedure Documentation   Procedure: central line, new line and elective  Position: supine  Patient Prep;all elements of maximal sterile barrier technique followed, mask, hat, sterile gown, sterile gloves, draped, hand hygiene, chlorhexidine gluconate and isopropyl alcohol, patient draped  Diagnosis:Need for central venous access / hemodynamic monitoring    Insertion Site:internal jugular, right    Skin infiltrated with 1 mL of 1% lidocaine.  Catheter: 7 Fr, 20 cm, T.L.  Assessment/Narrative  Complications: hematoma       Secured by suture  Tegaderm and Biopatch dressing used.  blood aspirated from all lumens  All lumens flushed: Yes  Verification method: X-ray and Placement to be verified post-opPerson verifying: Consulting Physician  Comments:  1st attempt unable to pass j-wire.  Needle/j-wire removed as a unit and pressure applied to site as immediate swelling noted.  Difficult placement re:swelling and depressed lumen noted vs pt quite dry and only dilated with pt valsalva.  Passed 15 cm and excellent heme aspiration all 3 lumens and flushed all lumens without noted incident.  Pt appear to tolerate well.

## 2019-03-30 NOTE — CONSULTS
Pharmacy Vancomycin Initial Note  Date of Service 2019  Patient's  6/15/1933  85 year old, female    Indication: Sepsis    Current estimated CrCl = CrCl cannot be calculated (Unknown ideal weight.).    Creatinine for last 3 days  3/30/2019:  9:27 AM Creatinine 1.49 mg/dL    Recent Vancomycin Level(s) for last 3 days  No results found for requested labs within last 72 hours.      Vancomycin IV Administrations (past 72 hours)      No vancomycin orders with administrations in past 72 hours.                Nephrotoxins and other renal medications (From now, onward)    Start     Dose/Rate Route Frequency Ordered Stop    19 1015  vancomycin (VANCOCIN) 1,500 mg in sodium chloride 0.9 % 250 mL intermittent infusion      1,500 mg  over 90 Minutes Intravenous EVERY 24 HOURS 19 1014      19 0959  piperacillin-tazobactam (ZOSYN) infusion 3.375 g      3.375 g  100 mL/hr over 30 Minutes Intravenous ONCE 19 0958            Contrast Orders - past 72 hours (72h ago, onward)    None                Plan:  1.  Start vancomycin  1500 mg IV q24h.   2.  Goal Trough Level: 15-20 mg/L   3.  Pharmacy will check trough levels as appropriate in 1-3 Days.    4. Serum creatinine levels will be ordered daily for the first week of therapy and at least twice weekly for subsequent weeks.    5. [place] method utilized to dose vancomycin therapy: Method 2    Gil Amaya

## 2019-03-30 NOTE — ED PROVIDER NOTES
History     Chief Complaint   Patient presents with     Shortness of Breath     HPI  Gustavo Palmer is a 85 year old female who presents with generalized weakness, fatigue, shortness of breath.  Patient seldom sees physicians.  Last hospitalization years ago for a total hip arthroplasty.  Patient reports over the last week and a half that she has had progressive fatigue with increased productive cough.  She acknowledges she smokes naproxen 1 pack of cigarettes per day and has done so since her teenage years.  She has had 20 pound weight loss in the last 6-9 months.  Reports recent chills but has not had any night sweats associated weight loss.  Denies any hemoptysis.  Her cough at times is productive but she states she can clear phlegm with coughing and just rattles in her chest.  She has had no chest discomfort or recognized palpitations.  She reports she had a very poor appetite she is only been consuming saltine crackers.  She denies abdominal pain.  She has had no vomiting or diarrhea.  Family members are ill with bronchitis.    Allergies:  Allergies   Allergen Reactions     No Known Drug Allergies        Problem List:    Patient Active Problem List    Diagnosis Date Noted     Health Care Home 10/08/2015     Priority: Medium     No longer active with [facility] case management effective 10/01/15.         Advanced directives, counseling/discussion 06/11/2015     Priority: Medium     Advance Care Planning 6/11/2015: Receipt of ACP document:  Received: Health Care Directive which was witnessed or notarized on 6/4/98.  Document not previously scanned.  Validation form completed and sent with document to be scanned.  Code Status reflects choices in most recent ACP document.  Confirmed/documented designated decision maker(s).  Added by Frandy Wall             Impaired fasting glucose 05/18/2015     Priority: Medium     CARDIOVASCULAR SCREENING; LDL GOAL LESS THAN 130 10/31/2010     Priority:  Medium     Primary localized osteoarthrosis, pelvic region and thigh 01/06/2010     Priority: Medium        Past Medical History:    Past Medical History:   Diagnosis Date     Unspecified hemorrhoids without mention of complication      Unspecified nonsenile cataract        Past Surgical History:    Past Surgical History:   Procedure Laterality Date     C TOTAL HIP ARTHROPLASTY  01/26/10    Right     HC REMV CATARACT EXTRACAP,INSERT LENS  2/17/2004    Right     HC REMV CATARACT EXTRACAP,INSERT LENS  3/16/2004    Left       Family History:    Family History   Problem Relation Age of Onset     Diabetes Brother         x2 brothers     Cancer Mother         lung cancer       Social History:  Marital Status:   [5]  Social History     Tobacco Use     Smoking status: Current Every Day Smoker     Packs/day: 1.00     Years: 40.00     Pack years: 40.00     Smokeless tobacco: Never Used   Substance Use Topics     Alcohol use: Yes     Comment: occ wine     Drug use: No        Medications:      No current outpatient medications on file.      Review of Systems   Constitutional: Positive for activity change, appetite change, chills and fatigue. Negative for fever.   HENT: Positive for hearing loss.    Eyes: Negative.    Respiratory: Positive for cough, shortness of breath and wheezing.    Cardiovascular: Negative for chest pain, palpitations and leg swelling.   Gastrointestinal: Negative for abdominal pain, constipation, diarrhea, nausea and vomiting.   Endocrine: Negative for polydipsia.   Genitourinary: Positive for decreased urine volume.   Musculoskeletal: Positive for myalgias.   Skin: Negative.    Neurological: Positive for weakness and light-headedness. Negative for headaches.   Psychiatric/Behavioral: Positive for decreased concentration.       Physical Exam   BP: 94/67  Heart Rate: 57  Temp: 98.2  F (36.8  C)  Resp: 24  Weight: 74.8 kg (165 lb)  SpO2: 95 %      Physical Exam   Constitutional: She is oriented to  person, place, and time. Vital signs are normal. She does not appear ill. No distress.   HENT:   Head: Normocephalic and atraumatic.   Right Ear: External ear normal.   Left Ear: External ear normal.   Nose: Nose normal.   Mouth/Throat: Mucous membranes are normal. No oropharyngeal exudate.   Dry oral mucous membranes   Eyes: Conjunctivae, EOM and lids are normal. Pupils are equal, round, and reactive to light. No scleral icterus.   Fundoscopic exam:       The right eye shows no hemorrhage.        The left eye shows no hemorrhage.   Neck: Trachea normal. Neck supple. No JVD present. Carotid bruit is not present. No thyromegaly present.   Cardiovascular: S1 normal, S2 normal and intact distal pulses. An irregularly irregular rhythm present.  No extrasystoles are present. Tachycardia present.   No murmur heard.  Heart rate was not picking up while in the cardiac monitor.  By pulse palpation appear to be greater than 120 bpm.   Pulmonary/Chest: Tachypnea noted. She has decreased breath sounds. She has wheezes. She has rhonchi. She has no rales.   Abdominal: Soft. Bowel sounds are normal. She exhibits no distension. There is no splenomegaly or hepatomegaly. There is no tenderness. There is no rebound. No hernia.   Musculoskeletal: Normal range of motion.   Lower extremities show no edema.  No calf tenderness with negative Homans test bilateral.   Lymphadenopathy:     She has no cervical adenopathy.   Neurological: She is alert and oriented to person, place, and time. She has normal strength and normal reflexes. No sensory deficit. She exhibits normal muscle tone.   Skin: Skin is warm and dry. Capillary refill takes 2 to 3 seconds. No rash noted. She is not diaphoretic. No pallor.   Psychiatric: She has a normal mood and affect. Her speech is normal and behavior is normal. Cognition and memory are normal.   Nursing note and vitals reviewed.      ED Course        Procedures          EKG:  Interpretation by Artemio Bartlett  DO.   Indication: Dyspnea with fatigue  Atrial fibrillation with rapid ventricular response.  Heart rate =170 bpm.  Diffuse nonspecific T abnormalities and ST segment depression.  Cannot rule out subendocardial injury or ischemia.  Impressions: Abnormal EKG with no comparison available.      The Lactic acid level is elevated due to sepsis, at this time there is no sign of severe sepsis or septic shock.         Results for orders placed or performed during the hospital encounter of 03/30/19   XR Chest 2 Views    Narrative    CHEST TWO VIEWS    3/30/2019 10:16 AM     HISTORY: Productive cough, sepsis workup    COMPARISON: Chest CT 8/5/2016, chest x-ray 8/5/2016      Impression    IMPRESSION: No acute abnormality. Lungs are hyperinflated, but clear.  No new opacities are identified. Heart size is unchanged. Thoracic  aorta is tortuous, unchanged.    MCKAY FARRELL MD   CBC with platelets differential   Result Value Ref Range    WBC 6.3 4.0 - 11.0 10e9/L    RBC Count 5.23 (H) 3.8 - 5.2 10e12/L    Hemoglobin 14.5 11.7 - 15.7 g/dL    Hematocrit 44.2 35.0 - 47.0 %    MCV 85 78 - 100 fl    MCH 27.7 26.5 - 33.0 pg    MCHC 32.8 31.5 - 36.5 g/dL    RDW 14.5 10.0 - 15.0 %    Platelet Count 130 (L) 150 - 450 10e9/L    Diff Method Automated Method     % Neutrophils 67.5 %    % Lymphocytes 16.0 %    % Monocytes 15.2 %    % Eosinophils 0.2 %    % Basophils 0.5 %    % Immature Granulocytes 0.6 %    Nucleated RBCs 0 0 /100    Absolute Neutrophil 4.3 1.6 - 8.3 10e9/L    Absolute Lymphocytes 1.0 0.8 - 5.3 10e9/L    Absolute Monocytes 1.0 0.0 - 1.3 10e9/L    Absolute Basophils 0.0 0.0 - 0.2 10e9/L    Abs Immature Granulocytes 0.0 0 - 0.4 10e9/L    Absolute Nucleated RBC 0.0    Comprehensive metabolic panel   Result Value Ref Range    Sodium 136 133 - 144 mmol/L    Potassium 4.7 3.4 - 5.3 mmol/L    Chloride 102 94 - 109 mmol/L    Carbon Dioxide 23 20 - 32 mmol/L    Anion Gap 11 3 - 14 mmol/L    Glucose 129 (H) 70 - 99 mg/dL    Urea  Nitrogen 24 7 - 30 mg/dL    Creatinine 1.49 (H) 0.52 - 1.04 mg/dL    GFR Estimate 32 (L) >60 mL/min/[1.73_m2]    GFR Estimate If Black 37 (L) >60 mL/min/[1.73_m2]    Calcium 8.8 8.5 - 10.1 mg/dL    Bilirubin Total 0.9 0.2 - 1.3 mg/dL    Albumin 4.0 3.4 - 5.0 g/dL    Protein Total 7.8 6.8 - 8.8 g/dL    Alkaline Phosphatase 72 40 - 150 U/L    ALT 29 0 - 50 U/L    AST 48 (H) 0 - 45 U/L   Lactic acid whole blood   Result Value Ref Range    Lactic Acid 3.7 (H) 0.7 - 2.0 mmol/L   Troponin I   Result Value Ref Range    Troponin I ES 0.092 (H) 0.000 - 0.045 ug/L   Blood gas venous   Result Value Ref Range    Ph Venous 7.30 (L) 7.32 - 7.43 pH    PCO2 Venous 47 40 - 50 mm Hg    PO2 Venous 20 (L) 25 - 47 mm Hg    Bicarbonate Venous 23 21 - 28 mmol/L    Base Deficit Venous 3.5 mmol/L    FIO2 21    Pharmacy to dose vancomycin    Narrative    Gil Amaya, [facility]     3/30/2019 10:14 AM  Pharmacy Vancomycin Initial Note  Date of Service 2019  Patient's  6/15/1933  85 year old, female    Indication: Sepsis    Current estimated CrCl = CrCl cannot be calculated (Unknown ideal   weight.).    Creatinine for last 3 days  3/30/2019:  9:27 AM Creatinine 1.49 mg/dL    Recent Vancomycin Level(s) for last 3 days  No results found for requested labs within last 72 hours.      Vancomycin IV Administrations (past 72 hours)      No vancomycin orders with administrations in past 72 hours.                Nephrotoxins and other renal medications (From now, onward)    Start     Dose/Rate Route Frequency Ordered Stop    19 1015  vancomycin (VANCOCIN) 1,500 mg in sodium chloride   0.9 % 250 mL intermittent infusion      1,500 mg  over 90 Minutes Intravenous EVERY 24 HOURS 19 1014      19 0959  piperacillin-tazobactam (ZOSYN) infusion 3.375 g        3.375 g  100 mL/hr over 30 Minutes Intravenous ONCE 19 0958            Contrast Orders - past 72 hours (72h ago, onward)    None                Plan:  1.  Start  vancomycin  1500 mg IV q24h.   2.  Goal Trough Level: 15-20 mg/L   3.  Pharmacy will check trough levels as appropriate in 1-3 Days.      4. Serum creatinine levels will be ordered daily for the first   week of therapy and at least twice weekly for subsequent weeks.    5. [place] method utilized to dose vancomycin therapy: Method 2    Gil Amaya       Influenza A/B antigen   Result Value Ref Range    Influenza A/B Agn Specimen Nasopharyngeal     Influenza A Negative NEG^Negative    Influenza B Negative NEG^Negative         Medications   0.9% sodium chloride BOLUS (not administered)     Followed by   sodium chloride 0.9% infusion (not administered)       Assessments & Plan (with Medical Decision Making)  85-year-old female presents with generalized weakness, fatigue, productive cough over the last 10 days.  Weight loss of 20 pounds over the last 6-9 months.  Poor appetite.  Examination noted patient to have a soft blood pressure BP = 94/67.  Pulse = 57.  Temperature = 98.2.  Oxygen saturation room air 95%.  Patient had dry oral mucous membranes.  Mentation normal.  Some hard of hearing.  Heart was irregularly irregular suggestive of atrial fibrillation.  No noted ectopy.  Her lungs noted to have greatly diminished breath sounds throughout.  Scattered rhonchi with straight wheezing present.  Findings appear to be consistent with COPD/exacerbation.   Plan/Problem list:  #1.  COPD with ongoing tobacco abuse disorder rule out pneumonia-versus COPD exacerbation-chest x-ray two-view, venous gas  #2.  Dehydration-start IV fluids  #3.  Irregular irregular cardiac rhythm-suspect atrial fibrillation-EKG pending/patient on a cardiac monitor  #4.  Sepsis screening -CBC, CMP, lactate  #5.  Elevated troponin without EKG showing acute myocardial infarction/ACS.  Suspect demand ischemia from A. fib RVR  9:59 AM  Elevated lactate confirmed (3.7).  Initiate sepsis protocol for unknown source.  Switch to LR with 30 cc/kg bolus  and antibiotic therapy with Zosyn and vancomycin.  11:31 AM  Call placed to hospitalist ( Dr. Adan).  Current vital signs show BP = 86/60.  Oxygen saturation =90% on room air.  Heart rate = 120-130 range.  Chest x-ray reviewed and shows no acute pulmonary process suggestive for infection.  UA UC is pending.  Blood cultures pending.  Diltiazem increased to 7.5 mg/h.  Reassessing if we need to give additional IV fluids after the 30 cc/kg bolus.  She did urinate in moderate amounts suggesting improved endorgan perfusion.  Skin color and tone looks improved.  Mentation improved.  11:47 AM  Dr. Adan excepted patient to the intensive care unit.  Reassessing patient she still having a heart rate at times between 120-140 bpm with persistent hypotension.  Lung bases are clear chest x-ray does not show any signs for CHF.  I believe she needs additional fluids before we consider pressor therapy.  She has received 30 cc/kg.  Added additional 1 L fluids and then will reassess.  Second lactate is being drawn at this time.  IV antibiotics are infusing.  Dr. Adan suggested considering either amiodarone or digoxin for rate control she is not responding to diltiazem.  11:50 AM  Critical care documentation.  Critical care management with one-to-one physician to patient for 30 minutes.  This included medical stabilization for hypotension, sepsis treatment, review of lab results, x-ray and care plan discussion with hospitalist.     I have reviewed the nursing notes.    I have reviewed the findings, diagnosis, plan and need for follow up with the patient.         Medication List      There are no discharge medications for this visit.         Final diagnoses:   Atrial fibrillation with RVR (H)   COPD exacerbation (H)   Lactic acid acidosis-sepsis versus dehydration related       3/30/2019   [facility] EMERGENCY DEPARTMENT     Artemio Bartlett, DO  03/30/19 0272

## 2019-03-30 NOTE — ANESTHESIA PREPROCEDURE EVALUATION
Anesthesia Pre-Procedure Evaluation    Patient: Gustavo Palmer   MRN: [id number] : 6/15/1933          Preoperative Diagnosis: * No pre-op diagnosis entered *    * No procedures listed *    Past Medical History:   Diagnosis Date     Unspecified hemorrhoids without mention of complication     Hemorrhoids     Unspecified nonsenile cataract      Past Surgical History:   Procedure Laterality Date     C TOTAL HIP ARTHROPLASTY  01/26/10    Right     HC REMV CATARACT EXTRACAP,INSERT LENS  2004    Right     HC REMV CATARACT EXTRACAP,INSERT LENS  3/16/2004    Left       Anesthesia Evaluation     . Pt has had prior anesthetic. Type: General    No history of anesthetic complications          ROS/MED HX    ENT/Pulmonary: Comment: Family members battling bronchitis / URI    (+)tobacco use, Current use 1 ppd x 40 + years packs/day  COPD, , . .    Neurologic:  - neg neurologic ROS     Cardiovascular:     (+) ----. : . . . :. . Previous cardiac testing date:results:date: results:ECG reviewed date:3/30/19 results:Possible atrial fibrillation - occasional ectopic ventricular beat   -ST depression   +   Diffuse nonspecific T-abnormality  -Subendocardial injury/ischemia.     ABNORMAL    date: results:          METS/Exercise Tolerance:     Hematologic:         Musculoskeletal:   (+) , , other musculoskeletal- SP R POLLY      GI/Hepatic:  - neg GI/hepatic ROS       Renal/Genitourinary:  - ROS Renal section negative       Endo:  - neg endo ROS       Psychiatric:  - neg psychiatric ROS       Infectious Disease:  - neg infectious disease ROS       Malignancy:      - no malignancy   Other:    - neg other ROS                      Physical Exam      Airway   Mallampati: II  TM distance: >3 FB  Neck ROM: full    Dental     Cardiovascular   Rhythm and rate: regular and abnormal  (-) no murmur    Pulmonary (+) decreased breath sounds               Lab Results   Component Value Date    WBC 6.3 2019    HGB 14.5 2019    HCT 44.2  "03/30/2019     (L) 03/30/2019     03/30/2019    POTASSIUM 4.7 03/30/2019    CHLORIDE 102 03/30/2019    CO2 23 03/30/2019    BUN 24 03/30/2019    CR 1.49 (H) 03/30/2019     (H) 03/30/2019    TONY 8.8 03/30/2019    ALBUMIN 4.0 03/30/2019    PROTTOTAL 7.8 03/30/2019    ALT 29 03/30/2019    AST 48 (H) 03/30/2019    ALKPHOS 72 03/30/2019    BILITOTAL 0.9 03/30/2019    INR 1.04 01/29/2010       Preop Vitals  BP Readings from Last 3 Encounters:   03/30/19 (!) 79/57   08/05/16 148/79   05/14/15 138/80    Pulse Readings from Last 3 Encounters:   03/30/19 118   08/05/16 79   05/14/15 80      Resp Readings from Last 3 Encounters:   03/30/19 20   08/05/16 11   01/19/10 18    SpO2 Readings from Last 3 Encounters:   03/30/19 94%   08/05/16 97%   05/14/15 97%      Temp Readings from Last 1 Encounters:   03/30/19 98.2  F (36.8  C) (Oral)    Ht Readings from Last 1 Encounters:   05/14/15 1.6 m (5' 3\")      Wt Readings from Last 1 Encounters:   03/30/19 74.8 kg (165 lb)    Estimated body mass index is 29.23 kg/m  as calculated from the following:    Height as of 5/14/15: 1.6 m (5' 3\").    Weight as of this encounter: 74.8 kg (165 lb).       Anesthesia Plan  Procedure only, no anesthetic delivered    History & Physical Review  History and physical reviewed and following examination; no interval change.    ASA Status:  3 emergent.        Plan for     Physician ordered central line placement possible a-line placement.      Postoperative Care      Consents                 KRISTEN Puentes CRNA  "

## 2019-03-31 ENCOUNTER — APPOINTMENT (OUTPATIENT)
Dept: CT IMAGING | Facility: CLINIC | Age: 84
DRG: 871 | End: 2019-03-31
Attending: PEDIATRICS
Payer: COMMERCIAL

## 2019-03-31 ENCOUNTER — APPOINTMENT (OUTPATIENT)
Dept: GENERAL RADIOLOGY | Facility: CLINIC | Age: 84
DRG: 871 | End: 2019-03-31
Attending: PEDIATRICS
Payer: COMMERCIAL

## 2019-03-31 PROBLEM — J41.0 SMOKERS' COUGH (H): Status: ACTIVE | Noted: 2019-03-30

## 2019-03-31 PROBLEM — R57.9 SHOCK (H): Status: ACTIVE | Noted: 2019-03-30

## 2019-03-31 PROBLEM — J18.9 PNEUMONIA OF BOTH LUNGS DUE TO INFECTIOUS ORGANISM: Status: ACTIVE | Noted: 2019-03-30

## 2019-03-31 LAB
ANION GAP SERPL CALCULATED.3IONS-SCNC: 10 MMOL/L (ref 3–14)
BACTERIA SPEC CULT: NORMAL
BACTERIA SPEC CULT: NORMAL
BASE EXCESS BLDV CALC-SCNC: 0.9 MMOL/L
BUN SERPL-MCNC: 12 MG/DL (ref 7–30)
CALCIUM SERPL-MCNC: 7.6 MG/DL (ref 8.5–10.1)
CHLORIDE SERPL-SCNC: 110 MMOL/L (ref 94–109)
CO2 SERPL-SCNC: 23 MMOL/L (ref 20–32)
CORTICOSTER 1H P 250 UG ACTH SERPL-SCNC: 26.6 UG/DL
CORTICOSTER SERPL-MCNC: 12.1 UG/DL (ref 4–22)
CREAT SERPL-MCNC: 0.94 MG/DL (ref 0.52–1.04)
DIGOXIN SERPL-MCNC: 0.9 UG/L (ref 0.5–2)
GFR SERPL CREATININE-BSD FRML MDRD: 55 ML/MIN/{1.73_M2}
GLUCOSE BLDC GLUCOMTR-MCNC: 107 MG/DL (ref 70–99)
GLUCOSE BLDC GLUCOMTR-MCNC: 120 MG/DL (ref 70–99)
GLUCOSE BLDC GLUCOMTR-MCNC: 123 MG/DL (ref 70–99)
GLUCOSE BLDC GLUCOMTR-MCNC: 98 MG/DL (ref 70–99)
GLUCOSE SERPL-MCNC: 118 MG/DL (ref 70–99)
HCO3 BLDV-SCNC: 27 MMOL/L (ref 21–28)
LACTATE BLD-SCNC: 0.9 MMOL/L (ref 0.7–2)
Lab: NORMAL
O2/TOTAL GAS SETTING VFR VENT: 24 %
PCO2 BLDV: 46 MM HG (ref 40–50)
PH BLDV: 7.37 PH (ref 7.32–7.43)
PO2 BLDV: 39 MM HG (ref 25–47)
POTASSIUM SERPL-SCNC: 3.9 MMOL/L (ref 3.4–5.3)
PROCALCITONIN SERPL-MCNC: <0.05 NG/ML
SODIUM SERPL-SCNC: 143 MMOL/L (ref 133–144)
SPECIMEN SOURCE: NORMAL
SPECIMEN SOURCE: NORMAL
TROPONIN I SERPL-MCNC: 0.53 UG/L (ref 0–0.04)
TROPONIN I SERPL-MCNC: 0.57 UG/L (ref 0–0.04)

## 2019-03-31 PROCEDURE — 82803 BLOOD GASES ANY COMBINATION: CPT | Performed by: PEDIATRICS

## 2019-03-31 PROCEDURE — 25000132 ZZH RX MED GY IP 250 OP 250 PS 637: Performed by: PEDIATRICS

## 2019-03-31 PROCEDURE — 83605 ASSAY OF LACTIC ACID: CPT | Performed by: PEDIATRICS

## 2019-03-31 PROCEDURE — 25800030 ZZH RX IP 258 OP 636: Performed by: EMERGENCY MEDICINE

## 2019-03-31 PROCEDURE — 25000128 H RX IP 250 OP 636: Performed by: EMERGENCY MEDICINE

## 2019-03-31 PROCEDURE — 99291 CRITICAL CARE FIRST HOUR: CPT | Performed by: PEDIATRICS

## 2019-03-31 PROCEDURE — 25000125 ZZHC RX 250: Performed by: PEDIATRICS

## 2019-03-31 PROCEDURE — 20000003 ZZH R&B ICU

## 2019-03-31 PROCEDURE — 71260 CT THORAX DX C+: CPT

## 2019-03-31 PROCEDURE — 80048 BASIC METABOLIC PNL TOTAL CA: CPT | Performed by: PEDIATRICS

## 2019-03-31 PROCEDURE — 00000146 ZZHCL STATISTIC GLUCOSE BY METER IP

## 2019-03-31 PROCEDURE — 84145 PROCALCITONIN (PCT): CPT | Performed by: PEDIATRICS

## 2019-03-31 PROCEDURE — 25000128 H RX IP 250 OP 636: Performed by: PEDIATRICS

## 2019-03-31 PROCEDURE — 82024 ASSAY OF ACTH: CPT | Performed by: PEDIATRICS

## 2019-03-31 PROCEDURE — 25000132 ZZH RX MED GY IP 250 OP 250 PS 637: Performed by: FAMILY MEDICINE

## 2019-03-31 PROCEDURE — 71045 X-RAY EXAM CHEST 1 VIEW: CPT | Mod: TC

## 2019-03-31 PROCEDURE — 80162 ASSAY OF DIGOXIN TOTAL: CPT | Performed by: PEDIATRICS

## 2019-03-31 PROCEDURE — 25800030 ZZH RX IP 258 OP 636: Performed by: PEDIATRICS

## 2019-03-31 PROCEDURE — 84484 ASSAY OF TROPONIN QUANT: CPT | Performed by: PEDIATRICS

## 2019-03-31 PROCEDURE — 82533 TOTAL CORTISOL: CPT | Performed by: PEDIATRICS

## 2019-03-31 RX ORDER — IOPAMIDOL 755 MG/ML
500 INJECTION, SOLUTION INTRAVASCULAR ONCE
Status: COMPLETED | OUTPATIENT
Start: 2019-03-31 | End: 2019-03-31

## 2019-03-31 RX ORDER — CEFTRIAXONE SODIUM 2 G/50ML
2 INJECTION, SOLUTION INTRAVENOUS EVERY 24 HOURS
Status: DISCONTINUED | OUTPATIENT
Start: 2019-03-31 | End: 2019-03-31 | Stop reason: CLARIF

## 2019-03-31 RX ORDER — CEFTRIAXONE 2 G/1
2 INJECTION, POWDER, FOR SOLUTION INTRAMUSCULAR; INTRAVENOUS EVERY 24 HOURS
Status: DISCONTINUED | OUTPATIENT
Start: 2019-03-31 | End: 2019-04-04

## 2019-03-31 RX ORDER — NICOTINE 21 MG/24HR
1 PATCH, TRANSDERMAL 24 HOURS TRANSDERMAL DAILY
Status: DISCONTINUED | OUTPATIENT
Start: 2019-03-31 | End: 2019-04-02

## 2019-03-31 RX ADMIN — CEFTRIAXONE SODIUM 2 G: 2 INJECTION, POWDER, FOR SOLUTION INTRAMUSCULAR; INTRAVENOUS at 18:02

## 2019-03-31 RX ADMIN — IOPAMIDOL 70 ML: 755 INJECTION, SOLUTION INTRAVENOUS at 15:09

## 2019-03-31 RX ADMIN — Medication 1 LOZENGE: at 13:30

## 2019-03-31 RX ADMIN — NICOTINE 1 PATCH: 21 PATCH TRANSDERMAL at 15:38

## 2019-03-31 RX ADMIN — Medication 1 MCG: at 12:37

## 2019-03-31 RX ADMIN — TAZOBACTAM SODIUM AND PIPERACILLIN SODIUM 3.38 G: 375; 3 INJECTION, SOLUTION INTRAVENOUS at 05:15

## 2019-03-31 RX ADMIN — SODIUM CHLORIDE, POTASSIUM CHLORIDE, SODIUM LACTATE AND CALCIUM CHLORIDE: 600; 310; 30; 20 INJECTION, SOLUTION INTRAVENOUS at 01:43

## 2019-03-31 RX ADMIN — SODIUM CHLORIDE, POTASSIUM CHLORIDE, SODIUM LACTATE AND CALCIUM CHLORIDE: 600; 310; 30; 20 INJECTION, SOLUTION INTRAVENOUS at 18:34

## 2019-03-31 RX ADMIN — Medication 1 LOZENGE: at 01:27

## 2019-03-31 RX ADMIN — Medication 1 LOZENGE: at 11:34

## 2019-03-31 RX ADMIN — AZITHROMYCIN MONOHYDRATE 500 MG: 500 INJECTION, POWDER, LYOPHILIZED, FOR SOLUTION INTRAVENOUS at 18:34

## 2019-03-31 RX ADMIN — VANCOMYCIN HYDROCHLORIDE 1500 MG: 1 INJECTION, POWDER, LYOPHILIZED, FOR SOLUTION INTRAVENOUS at 09:45

## 2019-03-31 RX ADMIN — TAZOBACTAM SODIUM AND PIPERACILLIN SODIUM 3.38 G: 375; 3 INJECTION, SOLUTION INTRAVENOUS at 12:37

## 2019-03-31 RX ADMIN — TAZOBACTAM SODIUM AND PIPERACILLIN SODIUM 3.38 G: 375; 3 INJECTION, SOLUTION INTRAVENOUS at 17:01

## 2019-03-31 RX ADMIN — Medication 1 LOZENGE: at 15:37

## 2019-03-31 RX ADMIN — SODIUM CHLORIDE 70 ML: 9 INJECTION, SOLUTION INTRAVENOUS at 15:09

## 2019-03-31 RX ADMIN — ENOXAPARIN SODIUM 40 MG: 40 INJECTION SUBCUTANEOUS at 13:27

## 2019-03-31 ASSESSMENT — ACTIVITIES OF DAILY LIVING (ADL)
ADLS_ACUITY_SCORE: 18
ADLS_ACUITY_SCORE: 20
ADLS_ACUITY_SCORE: 18
ADLS_ACUITY_SCORE: 20
ADLS_ACUITY_SCORE: 20
ADLS_ACUITY_SCORE: 18

## 2019-03-31 ASSESSMENT — MIFFLIN-ST. JEOR: SCORE: 1241.75

## 2019-03-31 NOTE — PROGRESS NOTES
[facility] [place], [place] Intensive Care Progress Note    Date of Service (when I saw the patient): 03/31/2019     Assessment & Plan   Gustavo Palmer is a 85 year old female who was admitted on 3/30/2019 with life-threatening shock including hypotension refractory to aggressive IV fluid resuscitation that ultimately necessitated initiation of vasopressors overnight and associated tissue hypoxia with elevated lactic acid level, confusion consistent with acute encephalopathy, acute kidney injury, and elevated troponin with ischemic EKG changes consistent with myocardial demand ischemia.  Cause for life-threatening shock remains unclear at this time differential diagnosis that includes hypovolemia associated with recent poor oral intake and diarrheal fluid losses and possibly cardiogenic shock associated with rapid atrial fibrillation.  Atrial fibrillation was of unknown duration, and she cardioverted after 1 dose of IV digoxin yesterday.  An acute coronary syndrome such as non-ST elevation MI is not suspected so far as she has no history of ischemic heart disease, has no anginal pain, and troponin is only mildly trended upward.  Septic shock is also in the differential diagnosis although infection has not been identified, pro calcitonin was undetectable, and cultures are negative so far.  Although she has cough, there are no signs of infiltrates radiographically on repeated chest x-rays, she has not had fever, and her BNP is normal without signs of pulmonary edema radiographically.  In the context of her chronic tobacco use, smokers cough seems most likely.  Although pyuria was present on UA initially, urine culture is negative so far, she has not had any recent symptoms to suggest UTI and her abnormal urinalysis may have been due to acute kidney injury rather than infection.  Consider adrenal insufficiency with adrenal crisis although she is not known to have any history of  adrenal disease and this current illness appears to have occurred abruptly.  Pulmonary embolism is in the differential diagnosis, but hypoxia is improving and d-dimer was only slightly elevated.      Rapid atrial fibrillation (H)    Shock (H) - hypovolemic and possibly cardiogenic    SIRS (systemic inflammatory response syndrome) (H) - tachycardia, tachypnea, elevated lactate    Smokers' cough (H)    Acute kidney injury (H)    Acute encephalopathy    Acute respiratory failure with hypoxia (H)    Demand ischemia of myocardium (H)    Diarrhea    Pyuria    Tobacco use    Acidosis    Thrombocytopenia (H)    * No resolved hospital problems. *    Continue vasopressors and aggressive IV fluid therapy, wean vasopressors for mean arterial pressure greater than 65 and could subsequently wean IV fluid therapy if blood pressure remains stable and if diarrhea slows  Anticipate echocardiogram tomorrow when that service is available at this facility  Now that renal function has improved, will perform chest CT with contrast today to exclude pulmonary embolism but also to reevaluate for lower respiratory tract infection  Check stool for enteric pathogens, Will not test for Clostridium difficile at this time because she has not had any antibiotic exposure until this hospital stay, has no abdominal pain, has not had fever, and has not had leukocytosis  Continue empiric antibiotic therapy today, recheck pro-calcitonin, and consider discontinuing empiric antibiotics tomorrow if cultures remain negative, pro-calcitonin remains undetectable, and no other clinical source for infection is identified  Follow serial troponin levels until decreasing  Recommended anticoagulation because of atrial fibrillation of unknown duration and [id number] score of 3, discussed options with the patient today who indicated that she generally prefers to minimize her contact with the healthcare system and therefore would not seem to be a good candidate for  warfarin which would require INR monitoring, she has no contraindications to newer options for direct oral anticoagulant therapy particularly now that renal function has improved so favor starting Eliquis or Xarelto although this will not be started therapeutically until she is medically stable and her jugular vein central catheter has been removed  Check stool for occult blood and follow hemoglobin  Perform cosyntropin stimulation testing today to exclude adrenal insufficiency, will not treat empirically with corticosteroids at this time but await those test results      Nutrition: Advance to a regular diet as tolerated  Lines present: Central Venous Catheter  DVT Prophylaxis: Enoxaparin (Lovenox) SQ started today now that renal function has improved  GI Prophylaxis: Not indicated  Procedures planned or completed today:  none  Restraints: Restraints for medical healing needed: NO.         Family update by me today: Yes     Marc Adan    Time Spent on this Encounter   Billing:  I spent 45 minutes bedside and on the inpatient unit today managing the critical care of Gustavo Palmer in relation to the issues listed in this note including the time spent evaluating and managing vasopressor therapy necessary for stabilizing blood pressure and hemodynamic status and/or sustaining life at this time.    Interval History   General:  feels better and appears to be improving, continuing to have large watery diarrheal stools which she had had at home in the day prior to admission but denies any abdominal pain or nausea   Vitals: blood pressure improved after starting vasopressors overnight, respiratory rate improved, heart rate improved after cardioverting to sinus rhythm late yesterday and no fever   Intake/Output: tolerating IV fluids, oliguric urine output and gaining weight   Nutrition: tolerating an advancing diet   Mental status: improved cognition and overall mental status, more alert and responding appropriately    Respiratory: improved respiratory effort and improved oxigenation   Cardiovascular improved heart rate, cardiac dysrthymias improved or resolved  and no chest pain   Renal: improved renal function   Neurologic: no focal deficits reported   Medications: vasopressors started   Interventions: central line   Consults: No consultations were requested since the last visit        Medications     lactated ringers 150 mL/hr at 03/31/19 0143     norepinephrine 0.06 mcg/kg/min (03/31/19 0945)       influenza Vac Split High-Dose  0.5 mL Intramuscular Prior to discharge     piperacillin-tazobactam  3.375 g Intravenous Q6H     vancomycin (VANCOCIN) IV  1,500 mg Intravenous Q24H       Physical Exam   Temp: 99.2  F (37.3  C) Temp src: Oral Temp  Min: 98  F (36.7  C)  Max: 99.2  F (37.3  C) BP: 96/54 Pulse: 64 Heart Rate: 73 Resp: 28 SpO2: 93 % O2 Device: Nasal cannula Oxygen Delivery: 1 LPM, CVP 10-13  Vitals:    03/30/19 0903 03/30/19 1440 03/31/19 0500   Weight: 74.8 kg (165 lb) 74.7 kg (164 lb 10.9 oz) 78 kg (171 lb 15.3 oz)     Body mass index is 27.75 kg/m .  I/O last 3 completed shifts:  In: 3820 [P.O.:500; I.V.:2270; IV Piggyback:1050]  Out: 650 [Urine:650]  Cumulative I/O 3.1L positive for hospitalization    Constitutional: Elderly woman who appears younger than her age with mild respiratory distress while resting in bed although able to speak full sentences  Neurologic: Alert and maintains wakefulness and attention, follows simple instructions, normal speech, no obvious focal deficits  Cardiovascular: regular rate and rhythm, no gallop or murmur, good radial pulse with brisk capillary refill  Respiratory: Tachypneic without use of accessory muscles, diminished breath sounds throughout, inspiratory crackles present in the lung bases bilaterally, no wheezing  GI: Hypoactive bowel sounds, nondistended abdomen, soft abdomen without tenderness or peritoneal signs  Skin: No rashes  Extremities: No pitting edema  Lines: No  erythema or discharge at entry site for Central Venous Catheter in right neck  Current lines are required for patient management      Data   Data reviewed today:  I personally reviewed the EKG tracing showing Normal sinus rhythm without ischemic changes today.  Recent Labs   Lab 03/31/19  0515 03/30/19  1740 03/30/19  0927   WBC  --   --  6.3   HGB  --   --  14.5   MCV  --   --  85   PLT  --   --  130*   INR  --  1.14  --      --  136   POTASSIUM 3.9  --  4.7   CHLORIDE 110*  --  102   CO2 23  --  23   BUN 12  --  24   CR 0.94  --  1.49*   ANIONGAP 10  --  11   TONY 7.6*  --  8.8   *  --  129*   ALBUMIN  --   --  4.0   PROTTOTAL  --   --  7.8   BILITOTAL  --   --  0.9   ALKPHOS  --   --  72   ALT  --   --  29   AST  --   --  48*   TROPI 0.568* 0.459* 0.092*     TSH 12.7 with free T4 1.25 yesterday  Magnesium 2.0 yesterday    Recent Results (from the past 24 hour(s))   XR Chest 1 View    Narrative    CHEST ONE VIEW  3/30/2019 4:16 PM     HISTORY:  Right IJ central line placement.    COMPARISON: Chest radiograph performed earlier today.      Impression    IMPRESSION: Right IJ central venous catheter has been placed, with tip  in the mid SVC. No pneumothorax. Lungs clear. Heart size and pulmonary  vascularity are within normal limits. Aortic calcification.     BRADLEY SULLIVAN MD   XR Chest Port 1 View    Narrative    CHEST PORTABLE ONE VIEW  3/31/2019 5:41 AM     HISTORY: Shortness of breath, cough, SIRS, rapid atrial fibrillation,  question infiltrate, question pulmonary edema.    COMPARISON: 3/30/2019      Impression    IMPRESSION: No acute abnormality. Lungs are well-inflated and clear.  Cardiac silhouette is mildly enlarged, unchanged. Right internal  jugular central line tip is within the upper superior vena cava,  unchanged.    MCKAY FARRELL MD     Venous blood gas pH 7.37, PCO2 46, bicarbonate 27 today  Digoxin level 0.9    Blood cultures negative so far, urine culture negative so far, lactic acid  0.9 this morning, pro calcitonin yesterday was undetectable at less than 0.05

## 2019-03-31 NOTE — CONSULTS
CARE TRANSITION SOCIAL WORK INITIAL ASSESSMENT:      Met with: Patient.    DATA  Active Problems:    Rapid atrial fibrillation (H)    SIRS (systemic inflammatory response syndrome) (H) - tachycardia, tachypnea, elevated lactate    Smokers' cough (H)    Pyuria    Tobacco use    Acute kidney injury (H)    Acute encephalopathy    Acidosis    Thrombocytopenia (H)    Acute respiratory failure with hypoxia (H)    Demand ischemia of myocardium (H)    Shock (H) - hypovolemic and possibly cardiogenic    Diarrhea       Primary Care Clinic Name: Patient reports [facility] (she said this is where she would go if needing a doctor)  Primary Care MD Name: No primary care doctor  Contact information and PCP information verified: Patient reports she does not have a primary care doctor, as she has not needed one       ASSESSMENT  Cognitive Status: awake, alert and oriented.                      Description of Support System: Supportive, Involved   Who is your support system?: Children, Other (specify)(and Angel العراقي )   Support Assessment: Adequate family and caregiver support, Adequate social supports   Insurance Concerns: No Insurance issues identified           This writer met with patient introduced self and role. Discussed discharge planning.  Patient lives with her daughter, Kirsty, and grandson, Angel.  She reports that she is very independent at home and still drives sometimes.  She does not have any home health services and stated she has never needed any.  She said that she recently got a new [facility] , but does not know her name at this time.      Patient stated that she does not have a primary care doctor, as she has not needed one.  She said that she would go to the [facility] if she did need to see a doctor.  Patient not well enough at this time to discuss choosing a primary doctor.      Patient reports some weakness in her legs, so will have to assess her physical  "abilities once she is doing better medically.  She does have a cane and a walker that she uses at home, \"if I need to\".     Patient stated that her grandson, Angel, would transport her home on day of discharge.         PLAN    Patient plans to return home with her daughter and grandson.  Care Transitions to continue to follow and assess for any discharge planning needs.              Michelle Thacker, BUSTER     "

## 2019-03-31 NOTE — SIGNIFICANT EVENT
Significant Event Note    Time of event: 5:27 PM March 31, 2019    Description of event:    Abnormal chest CT results demonstrating findings consistent with pneumonia including right lower lobe consolidation and patchy infiltrates involving right upper lobe, right middle lobe, and left lung.  No signs of pulmonary embolism on chest CT.    Plan:  Switch empiric vancomycin and Zosyn therapy to azithromycin and ceftriaxone for treatment of community-acquired pneumonia and would continue antibiotics to complete a course of treatment for pneumonia    Discussed with: bedside nurse    Marc Adan MD

## 2019-03-31 NOTE — PROGRESS NOTES
S: Shift note  B: Rapid atrial fibrillation, SIRS  A: Denies pain. Patient states she feels better, alert and oriented, cooperative. VSS, Levophed gtt to keep MAP > 65, now off. Afebrile. Lungs crackles, SOB with activity. Skin warm and dry, no edema noted. Monitor shows SR with PVCs. Continues to have loose stools. Tolerated regular diet.   R: Continue to monitor.

## 2019-03-31 NOTE — PLAN OF CARE
Patient started on Levophed to keep MAP's above 65. Levophed is at 0.113 mcg/kg/min. Breath sounds are diminished/expiratory wheeze. Coughing is wet non productive. Respiration rate has been in mid to high 20's.Titrated oxygen from 2L to 1L NC and cont to be in mid 90's. Chest X ray done this am is pending.   Patient got up to commode with one assist but cont to be in content of stool and urine.   Cardiac; Patient has been NSR with occasional PVC's. HR has been WNL. CVP WNL.  Will cont to monitor.

## 2019-04-01 ENCOUNTER — APPOINTMENT (OUTPATIENT)
Dept: CARDIOLOGY | Facility: CLINIC | Age: 84
DRG: 871 | End: 2019-04-01
Attending: PEDIATRICS
Payer: COMMERCIAL

## 2019-04-01 LAB
ACTH PLAS-MCNC: 19 PG/ML
ALBUMIN SERPL-MCNC: 2.9 G/DL (ref 3.4–5)
ALP SERPL-CCNC: 49 U/L (ref 40–150)
ALT SERPL W P-5'-P-CCNC: 25 U/L (ref 0–50)
ANION GAP SERPL CALCULATED.3IONS-SCNC: 7 MMOL/L (ref 3–14)
AST SERPL W P-5'-P-CCNC: 45 U/L (ref 0–45)
BILIRUB SERPL-MCNC: 0.4 MG/DL (ref 0.2–1.3)
BUN SERPL-MCNC: 6 MG/DL (ref 7–30)
CALCIUM SERPL-MCNC: 7.7 MG/DL (ref 8.5–10.1)
CHLORIDE SERPL-SCNC: 108 MMOL/L (ref 94–109)
CO2 SERPL-SCNC: 29 MMOL/L (ref 20–32)
CREAT SERPL-MCNC: 0.67 MG/DL (ref 0.52–1.04)
DIGOXIN SERPL-MCNC: 0.5 UG/L (ref 0.5–2)
GFR SERPL CREATININE-BSD FRML MDRD: 80 ML/MIN/{1.73_M2}
GLUCOSE BLDC GLUCOMTR-MCNC: 86 MG/DL (ref 70–99)
GLUCOSE BLDC GLUCOMTR-MCNC: 91 MG/DL (ref 70–99)
GLUCOSE BLDC GLUCOMTR-MCNC: 94 MG/DL (ref 70–99)
GLUCOSE SERPL-MCNC: 89 MG/DL (ref 70–99)
HEMOCCULT SP1 STL QL: POSITIVE
HGB BLD-MCNC: 10.6 G/DL (ref 11.7–15.7)
POTASSIUM SERPL-SCNC: 3.8 MMOL/L (ref 3.4–5.3)
PROT SERPL-MCNC: 5.9 G/DL (ref 6.8–8.8)
SODIUM SERPL-SCNC: 144 MMOL/L (ref 133–144)
TROPONIN I SERPL-MCNC: 0.34 UG/L (ref 0–0.04)

## 2019-04-01 PROCEDURE — 80162 ASSAY OF DIGOXIN TOTAL: CPT | Performed by: PEDIATRICS

## 2019-04-01 PROCEDURE — 80053 COMPREHEN METABOLIC PANEL: CPT | Performed by: PEDIATRICS

## 2019-04-01 PROCEDURE — 84484 ASSAY OF TROPONIN QUANT: CPT | Performed by: PEDIATRICS

## 2019-04-01 PROCEDURE — 25000125 ZZHC RX 250: Performed by: HOSPITALIST

## 2019-04-01 PROCEDURE — 25800030 ZZH RX IP 258 OP 636: Performed by: PEDIATRICS

## 2019-04-01 PROCEDURE — 94640 AIRWAY INHALATION TREATMENT: CPT

## 2019-04-01 PROCEDURE — 82274 ASSAY TEST FOR BLOOD FECAL: CPT | Performed by: PEDIATRICS

## 2019-04-01 PROCEDURE — 93005 ELECTROCARDIOGRAM TRACING: CPT

## 2019-04-01 PROCEDURE — 93306 TTE W/DOPPLER COMPLETE: CPT

## 2019-04-01 PROCEDURE — 93306 TTE W/DOPPLER COMPLETE: CPT | Mod: 26 | Performed by: INTERNAL MEDICINE

## 2019-04-01 PROCEDURE — 85018 HEMOGLOBIN: CPT | Performed by: PEDIATRICS

## 2019-04-01 PROCEDURE — 25000132 ZZH RX MED GY IP 250 OP 250 PS 637: Performed by: HOSPITALIST

## 2019-04-01 PROCEDURE — 00000146 ZZHCL STATISTIC GLUCOSE BY METER IP

## 2019-04-01 PROCEDURE — 99232 SBSQ HOSP IP/OBS MODERATE 35: CPT | Performed by: HOSPITALIST

## 2019-04-01 PROCEDURE — 99207 ZZC CDG-MDM COMPONENT: MEETS LOW - DOWN CODED: CPT | Performed by: HOSPITALIST

## 2019-04-01 PROCEDURE — 25000128 H RX IP 250 OP 636: Performed by: PEDIATRICS

## 2019-04-01 PROCEDURE — 25800030 ZZH RX IP 258 OP 636: Performed by: EMERGENCY MEDICINE

## 2019-04-01 PROCEDURE — 25000132 ZZH RX MED GY IP 250 OP 250 PS 637: Performed by: PEDIATRICS

## 2019-04-01 PROCEDURE — 20000003 ZZH R&B ICU

## 2019-04-01 RX ORDER — ALBUTEROL SULFATE 0.83 MG/ML
2.5 SOLUTION RESPIRATORY (INHALATION) EVERY 6 HOURS PRN
Status: DISCONTINUED | OUTPATIENT
Start: 2019-04-01 | End: 2019-04-05 | Stop reason: HOSPADM

## 2019-04-01 RX ORDER — IPRATROPIUM BROMIDE AND ALBUTEROL SULFATE 2.5; .5 MG/3ML; MG/3ML
3 SOLUTION RESPIRATORY (INHALATION)
Status: DISCONTINUED | OUTPATIENT
Start: 2019-04-01 | End: 2019-04-02

## 2019-04-01 RX ORDER — LIDOCAINE 40 MG/G
CREAM TOPICAL
Status: DISCONTINUED | OUTPATIENT
Start: 2019-04-01 | End: 2019-04-05 | Stop reason: HOSPADM

## 2019-04-01 RX ORDER — FAMOTIDINE 20 MG/1
20 TABLET, FILM COATED ORAL 2 TIMES DAILY
Status: DISCONTINUED | OUTPATIENT
Start: 2019-04-01 | End: 2019-04-04

## 2019-04-01 RX ADMIN — CEFTRIAXONE SODIUM 2 G: 2 INJECTION, POWDER, FOR SOLUTION INTRAMUSCULAR; INTRAVENOUS at 17:43

## 2019-04-01 RX ADMIN — FAMOTIDINE 20 MG: 20 TABLET ORAL at 13:53

## 2019-04-01 RX ADMIN — SODIUM CHLORIDE, POTASSIUM CHLORIDE, SODIUM LACTATE AND CALCIUM CHLORIDE: 600; 310; 30; 20 INJECTION, SOLUTION INTRAVENOUS at 09:47

## 2019-04-01 RX ADMIN — IPRATROPIUM BROMIDE AND ALBUTEROL SULFATE 3 ML: .5; 3 SOLUTION RESPIRATORY (INHALATION) at 21:25

## 2019-04-01 RX ADMIN — FAMOTIDINE 20 MG: 20 TABLET ORAL at 21:24

## 2019-04-01 RX ADMIN — IPRATROPIUM BROMIDE AND ALBUTEROL SULFATE 3 ML: .5; 3 SOLUTION RESPIRATORY (INHALATION) at 18:20

## 2019-04-01 RX ADMIN — NICOTINE 1 PATCH: 21 PATCH TRANSDERMAL at 09:26

## 2019-04-01 RX ADMIN — AZITHROMYCIN MONOHYDRATE 250 MG: 500 INJECTION, POWDER, LYOPHILIZED, FOR SOLUTION INTRAVENOUS at 09:28

## 2019-04-01 RX ADMIN — SODIUM CHLORIDE, POTASSIUM CHLORIDE, SODIUM LACTATE AND CALCIUM CHLORIDE: 600; 310; 30; 20 INJECTION, SOLUTION INTRAVENOUS at 02:11

## 2019-04-01 ASSESSMENT — ACTIVITIES OF DAILY LIVING (ADL)
ADLS_ACUITY_SCORE: 21
ADLS_ACUITY_SCORE: 20
ADLS_ACUITY_SCORE: 20
ADLS_ACUITY_SCORE: 21
ADLS_ACUITY_SCORE: 20
ADLS_ACUITY_SCORE: 20

## 2019-04-01 ASSESSMENT — MIFFLIN-ST. JEOR: SCORE: 1230.75

## 2019-04-01 NOTE — PLAN OF CARE
Patient breath sounds coarse/ expiratory wheeze. Non productive wet cough intermitently.   VS WNL. Was on 2L NC patient dipped to 89%, mouth breathing.3 L NC 97%, Tele: NSR w/ occasional PVC's. CVP WNL. Afebrile.   Patient had 2 liquid brown stools but were mixed with urine, culture was not obtainable. Will cont to monitor.

## 2019-04-01 NOTE — PLAN OF CARE
S-(situation): End of shift note.    B-(background): Pneumonia    A-(assessment): A&Ox4, afebrile, VSS.  CVP on brown lumen of CVC within ordered limits.  SR/SB with frequent PVCs.  Pt on 3L O2 by NC.  Up to commode with assist of one.  Does become dyspneic with exertion.  Recovers quickly.  Several loose green/brown stools this shift.  Unable to control bowel and bladder.  Incontinent of both and stool continues to be mixed with urine.  Specimen not able to be obtained at this time.  Lungs are diminished, occasional exp wheeze heard.  Infrequent cough.  Loose and congested, non-productive.  Excoriation of abd folds and around umbilicus, skin otherwise intact.  CVC in R internal jugular.  White and blue lumens SL.  ECHO with EF 50-55%.  QID and PRN nebs ordered.  IVF stopped to prevent overload.    R-(recommendations): Continue to monitor, collect stool and sputum sample.

## 2019-04-01 NOTE — PROGRESS NOTES
SPIRITUAL HEALTH SERVICES  SPIRITUAL ASSESSMENT Progress Note  [facility]      PRIMARY FOCUS:     Emotional/spiritual/Hinduism distress    Support for coping - Pt request    ILLNESS CIRCUMSTANCES:     Reviewed documentation.     Context of Serious Illness/Symptoms - SIRS/A-fib    Resources for Support - Daughter, Kirsty Palmer; grandson, Angel; sister, Tammi.  Tammi and Angel came into room as pt &  were visiting.  Pt shared that she and her daughter and grandson lived together.    DISTRESS:     Emotional/Spiritual/Existential Distress - Pt expressed relief that she came in when she did because staff had told her if she had not, she would have .  She voiced concern for her daughter, Kirsty, who is currently hospitalized on Med/Surg.    Restorationism Distress - Unknown    Social/Cultural/Economic Distress - Unknown    SPIRITUAL/Yarsani COPING:     Gnosticism/[place] - Adventism    Spiritual Practice - Prayer -  provided a prayer.    GOALS OF CARE:    Goals of Care - Address A-fib and SIRS.    Meaning/Sense-Making - Pt's savannah is central to her meaning-making.    PLAN:  will follow up tomorrow.    Tim Diaz M.Div., [facility]  Staff   Office tel: [phone]

## 2019-04-01 NOTE — PROGRESS NOTES
DATE:  3/31/2019   TIME OF RECEIPT FROM LAB:  1924  LAB TEST:  troponin  LAB VALUE:  0.534  RESULTS GIVEN WITH READ-BACK TO (PROVIDER):  Paul Allen MD  TIME LAB VALUE REPORTED TO PROVIDER:   1925

## 2019-04-01 NOTE — PROGRESS NOTES
[facility]    Medicine Progress Note - Hospitalist Service       Date of Admission:  3/30/2019  Assessment & Plan    bacterial pneumonia, present on admission  -continue rocephin+azithromycin  -add SVN  -continue O2 3 liter NC    Septic shock, resolved  -off levophed  -discontinue CVP monitoring  -transfer to med telemetry  -saline lock IVF due to I>O by 5.6 liters since admission  -cosyntropin test negative for adrenal insufficiency  -change from lovenox to SCD due to guaic +stool    Nicotine dependence  -continue niicoderm patch    Diarrhea with guaic positive stool  -add pepcid  -update endoscopy with PCP as outpatient  -stool C. Diff toxin pending        Diet: Regular Diet Adult    DVT Prophylaxis: Pneumatic Compression Devices  Ortiz Catheter: not present  Code Status: Full Code      Disposition Plan   Expected discharge: 2 - 3 days, recommended to prior living arrangement once antibiotic plan established.  Entered: Judith Gerber MD 04/01/2019, 11:30 AM       The patient's care was discussed with the Patient.    Judith Gerber MD  Hospitalist Service  [facility]    ______________________________________________________________________    Interval History   Breathing is better but very tired  Big bout of loose stools this morning only  Brief a fib with RVR which converted to NSR after one dose of digoxin    Data reviewed today: I reviewed all medications, new labs and imaging results over the last 24 hours. I personally reviewed the CTA of chest image(s) showing IMPRESSION:   1. No evidence for pulmonary embolism or thoracic aortic dissection.  2. Patchy ill-defined consolidation in both lungs could represent  pneumonia. Please clinically correlate.  3. Emphysema.  4. Cholelithiasis.   5. Ectasia of the ascending thoracic aorta measures up to 4 cm in  diameter, increased slightly since the previous exam.   6. Scattered mildly prominent and  borderline-enlarged mediastinal and  bilateral hilar lymph nodes are new since the previous exam, and are  nonspecific findings.         Physical Exam   Vital Signs: Temp: 97.6  F (36.4  C) Temp src: Oral BP: 134/67 Pulse: 62 Heart Rate: 70 Resp: 29 SpO2: 97 % O2 Device: Nasal cannula Oxygen Delivery: 3 LPM  Weight: 169 lbs 8.54 oz  Constitutional: She is oriented to person, place, and time. Vital signs are normal. She does not appear ill. No distress.   HENT:   Head: Normocephalic and atraumatic.   Right Ear: External ear normal.   Left Ear: External ear normal.   Nose: Nose normal.   Mouth/Throat: Mucous membranes are normal. No oropharyngeal exudate.   Dry oral mucous membranes   Eyes: Conjunctivae, EOM and lids are normal. Pupils are equal, round, and reactive to light. No scleral icterus.    Neck: Trachea normal. Neck supple. No JVD present. Carotid bruit is not present. No thyromegaly present.   Cardiovascular: S1 normal, S2 normal and intact distal pulses. An irregularly irregular rhythm present.  No extrasystoles are present. Tachycardia present.   No murmur heard.  Pulmonary/Chest: Tachypnea noted. She has decreased breath sounds. She has wheezes.   Abdominal: Soft. Bowel sounds are normal. She exhibits no distension. There is no splenomegaly or hepatomegaly. There is no tenderness. There is no rebound. No hernia.   Musculoskeletal: Normal range of motion.   Lower extremities show trace edema.  No calf tenderness with negative Homans test bilateral.   Lymphadenopathy: She has no cervical adenopathy.   Neurological: She is alert and oriented to person, place, and time. She has normal strength and normal reflexes. No sensory deficit. She exhibits normal muscle tone.   Skin: Skin is warm and dry.  No rash noted.  No pallor.   Psychiatric: She has a normal mood and affect. Her speech is normal and behavior is normal. Cognition and memory are normal.   Nursing note and vitals reviewed.      Data   Recent Labs    Lab 04/01/19  0510 03/31/19  1840 03/31/19  0515 03/30/19  1740 03/30/19  0927   WBC  --   --   --   --  6.3   HGB 10.6*  --   --   --  14.5   MCV  --   --   --   --  85   PLT  --   --   --   --  130*   INR  --   --   --  1.14  --      --  143  --  136   POTASSIUM 3.8  --  3.9  --  4.7   CHLORIDE 108  --  110*  --  102   CO2 29  --  23  --  23   BUN 6*  --  12  --  24   CR 0.67  --  0.94  --  1.49*   ANIONGAP 7  --  10  --  11   TONY 7.7*  --  7.6*  --  8.8   GLC 89  --  118*  --  129*   ALBUMIN 2.9*  --   --   --  4.0   PROTTOTAL 5.9*  --   --   --  7.8   BILITOTAL 0.4  --   --   --  0.9   ALKPHOS 49  --   --   --  72   ALT 25  --   --   --  29   AST 45  --   --   --  48*   TROPI 0.335* 0.534* 0.568* 0.459* 0.092*

## 2019-04-02 ENCOUNTER — APPOINTMENT (OUTPATIENT)
Dept: PHYSICAL THERAPY | Facility: CLINIC | Age: 84
DRG: 871 | End: 2019-04-02
Attending: HOSPITALIST
Payer: COMMERCIAL

## 2019-04-02 LAB
ALBUMIN SERPL-MCNC: 2.8 G/DL (ref 3.4–5)
ALP SERPL-CCNC: 54 U/L (ref 40–150)
ALT SERPL W P-5'-P-CCNC: 22 U/L (ref 0–50)
ANION GAP SERPL CALCULATED.3IONS-SCNC: 8 MMOL/L (ref 3–14)
AST SERPL W P-5'-P-CCNC: 36 U/L (ref 0–45)
BILIRUB SERPL-MCNC: 0.5 MG/DL (ref 0.2–1.3)
BUN SERPL-MCNC: 6 MG/DL (ref 7–30)
CALCIUM SERPL-MCNC: 7.8 MG/DL (ref 8.5–10.1)
CHLORIDE SERPL-SCNC: 110 MMOL/L (ref 94–109)
CO2 SERPL-SCNC: 28 MMOL/L (ref 20–32)
CREAT SERPL-MCNC: 0.61 MG/DL (ref 0.52–1.04)
ERYTHROCYTE [DISTWIDTH] IN BLOOD BY AUTOMATED COUNT: 14.3 % (ref 10–15)
FERRITIN SERPL-MCNC: 402 NG/ML (ref 8–252)
GFR SERPL CREATININE-BSD FRML MDRD: 82 ML/MIN/{1.73_M2}
GLUCOSE SERPL-MCNC: 117 MG/DL (ref 70–99)
HCT VFR BLD AUTO: 35 % (ref 35–47)
HGB BLD-MCNC: 11.2 G/DL (ref 11.7–15.7)
LACTATE BLD-SCNC: 1.7 MMOL/L (ref 0.7–2)
MAGNESIUM SERPL-MCNC: 1.7 MG/DL (ref 1.6–2.3)
MCH RBC QN AUTO: 27.3 PG (ref 26.5–33)
MCHC RBC AUTO-ENTMCNC: 32 G/DL (ref 31.5–36.5)
MCV RBC AUTO: 85 FL (ref 78–100)
PLATELET # BLD AUTO: 59 10E9/L (ref 150–450)
POTASSIUM SERPL-SCNC: 3.4 MMOL/L (ref 3.4–5.3)
PROT SERPL-MCNC: 5.9 G/DL (ref 6.8–8.8)
RBC # BLD AUTO: 4.1 10E12/L (ref 3.8–5.2)
RETICS # AUTO: 43.2 10E9/L (ref 25–95)
RETICS/RBC NFR AUTO: 1.1 % (ref 0.5–2)
SODIUM SERPL-SCNC: 146 MMOL/L (ref 133–144)
TROPONIN I SERPL-MCNC: 0.14 UG/L (ref 0–0.04)
VIT B12 SERPL-MCNC: 2068 PG/ML (ref 193–986)
WBC # BLD AUTO: 2.2 10E9/L (ref 4–11)

## 2019-04-02 PROCEDURE — 20000003 ZZH R&B ICU

## 2019-04-02 PROCEDURE — 25000132 ZZH RX MED GY IP 250 OP 250 PS 637: Performed by: HOSPITALIST

## 2019-04-02 PROCEDURE — 97162 PT EVAL MOD COMPLEX 30 MIN: CPT | Mod: GP

## 2019-04-02 PROCEDURE — 99233 SBSQ HOSP IP/OBS HIGH 50: CPT | Performed by: HOSPITALIST

## 2019-04-02 PROCEDURE — 25800030 ZZH RX IP 258 OP 636: Performed by: FAMILY MEDICINE

## 2019-04-02 PROCEDURE — 83605 ASSAY OF LACTIC ACID: CPT | Performed by: PEDIATRICS

## 2019-04-02 PROCEDURE — 97110 THERAPEUTIC EXERCISES: CPT | Mod: GP

## 2019-04-02 PROCEDURE — 87506 IADNA-DNA/RNA PROBE TQ 6-11: CPT | Performed by: PEDIATRICS

## 2019-04-02 PROCEDURE — 85045 AUTOMATED RETICULOCYTE COUNT: CPT | Performed by: PEDIATRICS

## 2019-04-02 PROCEDURE — 80053 COMPREHEN METABOLIC PANEL: CPT | Performed by: HOSPITALIST

## 2019-04-02 PROCEDURE — 25000128 H RX IP 250 OP 636: Performed by: PEDIATRICS

## 2019-04-02 PROCEDURE — 83735 ASSAY OF MAGNESIUM: CPT | Performed by: HOSPITALIST

## 2019-04-02 PROCEDURE — 84484 ASSAY OF TROPONIN QUANT: CPT | Performed by: HOSPITALIST

## 2019-04-02 PROCEDURE — 25800030 ZZH RX IP 258 OP 636: Performed by: PEDIATRICS

## 2019-04-02 PROCEDURE — 25000128 H RX IP 250 OP 636: Performed by: HOSPITALIST

## 2019-04-02 PROCEDURE — 25000125 ZZHC RX 250: Performed by: HOSPITALIST

## 2019-04-02 PROCEDURE — 82728 ASSAY OF FERRITIN: CPT | Performed by: HOSPITALIST

## 2019-04-02 PROCEDURE — 85027 COMPLETE CBC AUTOMATED: CPT | Performed by: HOSPITALIST

## 2019-04-02 PROCEDURE — 82607 VITAMIN B-12: CPT | Performed by: HOSPITALIST

## 2019-04-02 RX ORDER — BUDESONIDE 0.5 MG/2ML
0.5 INHALANT ORAL 2 TIMES DAILY
Status: DISCONTINUED | OUTPATIENT
Start: 2019-04-02 | End: 2019-04-03

## 2019-04-02 RX ORDER — SODIUM CHLORIDE 9 MG/ML
INJECTION, SOLUTION INTRAVENOUS CONTINUOUS
Status: DISCONTINUED | OUTPATIENT
Start: 2019-04-02 | End: 2019-04-05 | Stop reason: HOSPADM

## 2019-04-02 RX ORDER — DIGOXIN 0.25 MG/ML
250 INJECTION INTRAMUSCULAR; INTRAVENOUS ONCE
Status: COMPLETED | OUTPATIENT
Start: 2019-04-02 | End: 2019-04-02

## 2019-04-02 RX ORDER — METOPROLOL TARTRATE 1 MG/ML
5 INJECTION, SOLUTION INTRAVENOUS EVERY 5 MIN PRN
Status: DISCONTINUED | OUTPATIENT
Start: 2019-04-02 | End: 2019-04-05 | Stop reason: HOSPADM

## 2019-04-02 RX ORDER — DILTIAZEM HYDROCHLORIDE 30 MG/1
30 TABLET, FILM COATED ORAL EVERY 6 HOURS SCHEDULED
Status: DISCONTINUED | OUTPATIENT
Start: 2019-04-02 | End: 2019-04-03

## 2019-04-02 RX ORDER — METHYLPREDNISOLONE SODIUM SUCCINATE 125 MG/2ML
80 INJECTION, POWDER, LYOPHILIZED, FOR SOLUTION INTRAMUSCULAR; INTRAVENOUS EVERY 8 HOURS
Status: DISCONTINUED | OUTPATIENT
Start: 2019-04-02 | End: 2019-04-04

## 2019-04-02 RX ADMIN — FAMOTIDINE 20 MG: 20 TABLET ORAL at 08:47

## 2019-04-02 RX ADMIN — FAMOTIDINE 20 MG: 20 TABLET ORAL at 21:00

## 2019-04-02 RX ADMIN — CEFTRIAXONE SODIUM 2 G: 2 INJECTION, POWDER, FOR SOLUTION INTRAMUSCULAR; INTRAVENOUS at 18:47

## 2019-04-02 RX ADMIN — DILTIAZEM HYDROCHLORIDE 30 MG: 30 TABLET, FILM COATED ORAL at 11:19

## 2019-04-02 RX ADMIN — BUDESONIDE 0.5 MG: 0.5 INHALANT RESPIRATORY (INHALATION) at 21:04

## 2019-04-02 RX ADMIN — METHYLPREDNISOLONE SODIUM SUCCINATE 81.25 MG: 125 INJECTION, POWDER, FOR SOLUTION INTRAMUSCULAR; INTRAVENOUS at 11:17

## 2019-04-02 RX ADMIN — DIGOXIN 250 MCG: 0.25 INJECTION INTRAMUSCULAR; INTRAVENOUS at 09:35

## 2019-04-02 RX ADMIN — AZITHROMYCIN MONOHYDRATE 250 MG: 500 INJECTION, POWDER, LYOPHILIZED, FOR SOLUTION INTRAVENOUS at 08:44

## 2019-04-02 RX ADMIN — SODIUM CHLORIDE: 9 INJECTION, SOLUTION INTRAVENOUS at 23:29

## 2019-04-02 RX ADMIN — DILTIAZEM HYDROCHLORIDE 30 MG: 30 TABLET, FILM COATED ORAL at 18:47

## 2019-04-02 RX ADMIN — METHYLPREDNISOLONE SODIUM SUCCINATE 81.25 MG: 125 INJECTION, POWDER, FOR SOLUTION INTRAMUSCULAR; INTRAVENOUS at 21:00

## 2019-04-02 ASSESSMENT — ACTIVITIES OF DAILY LIVING (ADL)
ADLS_ACUITY_SCORE: 21
ADLS_ACUITY_SCORE: 20

## 2019-04-02 ASSESSMENT — MIFFLIN-ST. JEOR: SCORE: 1200.75

## 2019-04-02 NOTE — PLAN OF CARE
Patient is alert/oriented.  Vitals stable, SR with PVC on telemetry, afebrile.  LS are coarse and diminished, harsh moist non productive (still awaiting sputum for sample) cough, remains on 3 L NC.  She becomes very dyspneic with activity.    Skin is intact, bed bath completed.  No complaints of pain over night.  Stand and pivots to the commode, UOP adequate.  Not enough stool captured for sample, awaiting to send for Enteric MIRIAM.  She offers no complaints over night.

## 2019-04-02 NOTE — PLAN OF CARE
Pt converted to AFIB RVR this AM, see previous notes.  Continues in Afib, HR currently at 70-80.  Pt continues to be asymptomatic. Digoxin and po cardizem given today.  Nicotine patch DC'd.  Solumedrol given. Lactic -1.7 today.  Resting well between cares.  Voiding without difficulty.  Appetite is fair for brkst, refused lunch.

## 2019-04-02 NOTE — PROGRESS NOTES
Patient experiencing weakness due to prolonged hospital stay.  Patient will have a physical therapy evaluation today.  Patient states she might need TCU at discharge.  She has been to [facility] (Admissions: [phone] Main Phone: [phone] Fax: [phone]) in the past and requests that referral be sent to [facility].  Referral sent.    [facility] [phone]/ [facility] [phone]

## 2019-04-02 NOTE — PROGRESS NOTES
[facility]    Medicine Progress Note - Hospitalist Service       Date of Admission:  3/30/2019  Assessment & Plan    bacterial pneumonia, present on admission  -continue rocephin+azithromycin  -continue SVN prn, add pulmicort and solumedrol taper for persistent hypoxia  -continue O2 3 liter NC    A fib with RVR  -continue cardizem and metoprolol prn  -3rd dose of digoxin given IV(repeat level in AM)  -resume cardizem drip prn, back to ICU status due to the drip  -can't use any anticoagulation due to guaic + stool and no GI work up so far  -TSH elevated but free T4 normal  -CTA of chest negative for PE    Nicotine dependence  -hold niicoderm patch due to tachycardia    Diarrhea with guaic positive stool  -continue pepcid  -stool study ordered  -update endoscopy with PCP/GI as outpatient, sooner if anticoagulation for a fib is considered or H/H drops significantly    Anemia  Leukopenia  -iron study/B12/retic pending    Diet: Regular Diet Adult    DVT Prophylaxis: Pneumatic Compression Devices  Ortiz Catheter: not present  Code Status: Full Code      Disposition Plan   Expected discharge: 2 - 3 days, recommended to prior living arrangement once antibiotic plan established and a fib with RVR is under control  Entered: Judith Gerber MD 04/02/2019, 2:56 PM       The patient's care was discussed with the Patient.    Judith Gerber MD  Hospitalist Service  [facility]    ______________________________________________________________________    Interval History   A fib with RVR recurs, ventricular rate goes up with activity despite oral cardizem and metoprolol iv prn and repeat dose of iv digoxin  Denies chest pain or worsening dyspnea    Data reviewed today: I reviewed all medications, new labs and imaging results over the last 24 hours. I personally reviewed the CTA of chest image(s) showing IMPRESSION:   1. No evidence for pulmonary embolism or thoracic aortic  dissection.  2. Patchy ill-defined consolidation in both lungs could represent  pneumonia. Please clinically correlate.  3. Emphysema.  4. Cholelithiasis.   5. Ectasia of the ascending thoracic aorta measures up to 4 cm in  diameter, increased slightly since the previous exam.   6. Scattered mildly prominent and borderline-enlarged mediastinal and  bilateral hilar lymph nodes are new since the previous exam, and are  nonspecific findings.         Physical Exam   Vital Signs: Temp: 98.5  F (36.9  C) Temp src: Oral BP: 96/49 Pulse: 149 Heart Rate: 168 Resp: (!) 33 SpO2: 92 % O2 Device: Nasal cannula Oxygen Delivery: 3 LPM  Weight: 162 lbs 14.72 oz  Constitutional: She is oriented to person, place, and time. Vital signs are normal. She does not appear ill. No distress.   HENT:   Head: Normocephalic and atraumatic.   Right Ear: External ear normal.   Left Ear: External ear normal.   Nose: Nose normal.   Mouth/Throat: Mucous membranes are normal. No oropharyngeal exudate.   Dry oral mucous membranes   Eyes: Conjunctivae, EOM and lids are normal. Pupils are equal, round, and reactive to light. No scleral icterus.    Neck: Trachea normal. Neck supple. No JVD present. Carotid bruit is not present. No thyromegaly present.   Cardiovascular: S1 normal, S2 normal and intact distal pulses. An irregularly irregular rhythm present.  No extrasystoles are present. Tachycardia present.   No murmur heard.  Pulmonary/Chest: Tachypnea noted. She has decreased breath sounds. She has wheezes.   Abdominal: Soft. Bowel sounds are normal. She exhibits no distension. There is no splenomegaly or hepatomegaly. There is no tenderness. There is no rebound. No hernia.   Musculoskeletal: Normal range of motion.   Lower extremities show trace edema.  No calf tenderness with negative Homans test bilateral.   Lymphadenopathy: She has no cervical adenopathy.   Neurological: She is alert and oriented to person, place, and time. She has normal strength  and normal reflexes. No sensory deficit. She exhibits normal muscle tone.   Skin: Skin is warm and dry.  No rash noted.  No pallor.   Psychiatric: She has a normal mood and affect. Her speech is normal and behavior is normal. Cognition and memory are normal.   Nursing note and vitals reviewed.        Data   Recent Labs   Lab 04/02/19  1115 04/02/19  1020 04/01/19  0510 03/31/19  1840 03/31/19  0515 03/30/19  1740 03/30/19  0927   WBC  --  2.2*  --   --   --   --  6.3   HGB  --  11.2* 10.6*  --   --   --  14.5   MCV  --  85  --   --   --   --  85   PLT  --  59*  --   --   --   --  130*   INR  --   --   --   --   --  1.14  --    NA  --  146* 144  --  143  --  136   POTASSIUM  --  3.4 3.8  --  3.9  --  4.7   CHLORIDE  --  110* 108  --  110*  --  102   CO2  --  28 29  --  23  --  23   BUN  --  6* 6*  --  12  --  24   CR  --  0.61 0.67  --  0.94  --  1.49*   ANIONGAP  --  8 7  --  10  --  11   TONY  --  7.8* 7.7*  --  7.6*  --  8.8   GLC  --  117* 89  --  118*  --  129*   ALBUMIN  --  2.8* 2.9*  --   --   --  4.0   PROTTOTAL  --  5.9* 5.9*  --   --   --  7.8   BILITOTAL  --  0.5 0.4  --   --   --  0.9   ALKPHOS  --  54 49  --   --   --  72   ALT  --  22 25  --   --   --  29   AST  --  36 45  --   --   --  48*   TROPI 0.137*  --  0.335* 0.534* 0.568* 0.459* 0.092*

## 2019-04-02 NOTE — PROVIDER NOTIFICATION
Digoxin and Lopressor held at this time given BP.  Discussed with MD, awaiting updated orders.  Charge RN has been made aware as well.

## 2019-04-02 NOTE — PROGRESS NOTES
SPIRITUAL HEALTH SERVICES  SPIRITUAL ASSESSMENT Progress Note  [facility]      (Follow up) - Pt indicated she was tired from a busy morning, but would welcome a prayer.  As a result,  simply provided a prayer.   will follow up on Thursday if pt is still hospitalized.    Tim Diaz M.Div., [facility]  Staff   Office tel: [phone]

## 2019-04-02 NOTE — PROGRESS NOTES
04/02/19 1423   Quick Adds   Type of Visit Initial PT Evaluation   Living Environment   Lives With child(tabitha), adult   Living Arrangements mobile home  (trailer house)   Home Accessibility stairs to enter home   Number of Stairs, Main Entrance 4   Stair Railings, Main Entrance railings on both sides of stairs   Transportation Anticipated family or friend will provide   Living Environment Comment Adult grandson and adult child.   Self-Care   Usual Activity Tolerance good   Current Activity Tolerance poor   Regular Exercise No   Equipment Currently Used at Home cane, straight;walker, rolling   Activity/Exercise/Self-Care Comment Hasn't used the walker since her hip replacement. She only uses her cane when she feels she needs it   Functional Level Prior   Ambulation 1-->assistive equipment   Transferring 0-->independent   Toileting 0-->independent   Bathing 0-->independent   Communication 0-->understands/communicates without difficulty   Swallowing 0-->swallows foods/liquids without difficulty   Cognition 0 - no cognition issues reported   Fall history within last six months no   Which of the above functional risks had a recent onset or change? none   Prior Functional Level Comment Notes that she is very active, IND at home with all mobility and activities, and is still driving   General Information   Onset of Illness/Injury or Date of Surgery - Date 03/30/19   Referring Physician Dr. Gerber   Patient/Family Goals Statement To be stronger and be able to return home   Pertinent History of Current Problem (include personal factors and/or comorbidities that impact the POC) Pt admitted to ICU through ED on 3/30/2019 with SOB and weakness. During stay pt noted to have rapid A-fib, SIRS, hypotension, elevated lactic levels, acute respiratory failure, acute kidney injury, acute encephaelopathy, myocardial demand ischemia. Noted to be improving medically at this time and seen for PT eval for deconditioning and discharge  planning. Pt agreeable to PT evaluation. Does have hx of POLLY in 2010 on RLE.   Precautions/Limitations fall precautions   Weight-Bearing Status - LUE full weight-bearing   Weight-Bearing Status - RUE full weight-bearing   Weight-Bearing Status - LLE full weight-bearing   Weight-Bearing Status - RLE full weight-bearing   Heart Disease Risk Factors Smoking;Diabetes;Age   General Observations engaged with therapist, noted to have good sense of humor through eval, motivated to rebuild her strength and energy. ON 3L O2 NC at 95% sats and HR at 130-140s resting in bed.   General Info Comments Upon leaving room, bed alarm set, call light in place, and no further needs noted at this time   Cognitive Status Examination   Orientation orientation to person, place and time   Level of Consciousness alert   Follows Commands and Answers Questions 100% of the time   Personal Safety and Judgment intact   Memory intact   Pain Assessment   Patient Currently in Pain No   Integumentary/Edema   Integumentary/Edema no deficits were identifed   Posture    Posture Forward head position;Protracted shoulders   Range of Motion (ROM)   ROM Comment No formal measurements. pt able to complete good heel slide in bed with good hip and knee ROM, able to elevate arms partially overhead, limited by lines from various medical equipment   Strength   Strength Comments Not formally assessed, in generall is demonstrating decreased strength and endurance for funcitonal mobility.   Bed Mobility   Bed Mobility Comments IND wiht bed mobility. noted to complete sit <> supine transfers without assist and with good efficiency. Noted to have slight drop in o2 sats (to low 90s) and elevation of HR to 160s   Transfer Skills   Transfer Transfer Skill:  Stand to Sit;Transfer Skill: Sit to Stand;Transfer Safety Analysis Sit/Stand;Transfer Safety Analysis Stand/Sit   Transfer Comments Noted to have drop with sit to stand transfer in O2 sats to 87% and HR elevated to 177  bpm. Fatigued quickly with standing only able to tolerate about 1 mins with cues to breathe in through nose to maximize oxygen intake   Transfer Skill:  Sit to Stand   Level of [facility]: Sit/Stand stand-by assist   Physical Assist/Nonphysical Assist: Sit/Stand supervision   Weightbearing Restrictions: Sit/Stand full weight-bearing   Assistive Device for Transfer: Sit/Stand rolling walker   Transfer Safety Analysis Sit to Stand   Impairments Contributing to Impaired Transfers: Sit to Stand decreased strength   Transfer Skill: Stand to Sit   Level of [facility]: Stand/Sit stand-by assist   Physical Assist/Nonphysical Assist: Stand/Sit supervision   Weight-Bearing Restrictions: Stand/Sit full weight-bearing   Assistive Device: Stand to Sit rolling walker   Transfer Safety Analysis Stand To Sit   Impairments Contributing to Impaired Transfers: Stand to Sit decreased strength   Gait   Gait Comments Did not ambulate. Did take standing marches in place for pre-gait activities. Noted to have a RLE begin to collapse on her with increased fatigue wiht standing wiht no LOB and use of support on FWW to maintain stance. Due to vital changes, fatigue, and slight collapse of RLE wiht pregait, did not ambulate at this time   Balance   Balance Comments seated balance was good. noted to require UE support on walker with standing balance.   General Therapy Interventions   Planned Therapy Interventions balance training;bed mobility training;gait training;joint mobilization;manual therapy;neuromuscular re-education;ROM;strengthening;stretching;transfer training;motor coordination training;risk factor education;home program guidelines;progressive activity/exercise   Intervention Comments prn   Clinical Impression   Criteria for Skilled Therapeutic Intervention yes, treatment indicated   PT Diagnosis Deconditioning, weakness   Influenced by the following impairments Decreased endurance, decreased strength, impaired standing balance  "  Functional limitations due to impairments Difficulty with negotiating home, difficulty with stairs, difficulty with self cares,    Clinical Presentation Evolving/Changing   Clinical Presentation Rationale Due to patients changing medical status, her changes in vital signs with mobility, presentation is changing    Clinical Decision Making (Complexity) Moderate complexity   Therapy Frequency` daily   Predicted Duration of Therapy Intervention (days/wks) 3 days   Anticipated Equipment Needs at Discharge front wheeled walker   Anticipated Discharge Disposition Transitional Care Facility   Risk & Benefits of therapy have been explained Yes   Patient, Family & other staff in agreement with plan of care Yes   Clinical Impression Comments Pt will benefit from skilled PT to progress strength and endurance for improved safety and independence with functional mobility.   [facility] AM-PAC TM \"6 Clicks\"   2016, Trustees of [facility], under license to [employer].  All rights reserved.   6 Clicks Short Forms Basic Mobility Inpatient Short Form   [facility] AM-PAC  \"6 Clicks\" V.2 Basic Mobility Inpatient Short Form   1. Turning from your back to your side while in a flat bed without using bedrails? 4 - None   2. Moving from lying on your back to sitting on the side of a flat bed without using bedrails? 4 - None   3. Moving to and from a bed to a chair (including a wheelchair)? 3 - A Little   4. Standing up from a chair using your arms (e.g., wheelchair, or bedside chair)? 3 - A Little   5. To walk in hospital room? 2 - A Lot   6. Climbing 3-5 steps with a railing? 1 - Total   Basic Mobility Raw Score (Score out of 24.Lower scores equate to lower levels of function) 17   Total Evaluation Time   Total Evaluation Time (Minutes) 20     Thank you for your referral,     Corie Cueva, PT, DPT  [phone]  [facility]    "

## 2019-04-02 NOTE — PROVIDER NOTIFICATION
Pt converted to AFIB with RVR, HR- 150's.  Pt is asymptomatic at this time.  Denies pain.  See VS F/S. Charge RN and Dr. Gerber updated.  Will await orders.

## 2019-04-03 ENCOUNTER — APPOINTMENT (OUTPATIENT)
Dept: PHYSICAL THERAPY | Facility: CLINIC | Age: 84
DRG: 871 | End: 2019-04-03
Payer: COMMERCIAL

## 2019-04-03 PROBLEM — I48.91 RAPID ATRIAL FIBRILLATION (H): Status: RESOLVED | Noted: 2019-03-30 | Resolved: 2019-04-03

## 2019-04-03 PROBLEM — I48.91 ATRIAL FIBRILLATION WITH RVR (H): Status: ACTIVE | Noted: 2019-04-03

## 2019-04-03 PROBLEM — R65.10 SIRS (SYSTEMIC INFLAMMATORY RESPONSE SYNDROME) (H): Status: RESOLVED | Noted: 2019-03-30 | Resolved: 2019-04-03

## 2019-04-03 LAB
ANION GAP SERPL CALCULATED.3IONS-SCNC: 10 MMOL/L (ref 3–14)
BUN SERPL-MCNC: 12 MG/DL (ref 7–30)
C COLI+JEJUNI+LARI FUSA STL QL NAA+PROBE: NOT DETECTED
CALCIUM SERPL-MCNC: 7.9 MG/DL (ref 8.5–10.1)
CHLORIDE SERPL-SCNC: 110 MMOL/L (ref 94–109)
CO2 SERPL-SCNC: 25 MMOL/L (ref 20–32)
CREAT SERPL-MCNC: 0.62 MG/DL (ref 0.52–1.04)
CREAT SERPL-MCNC: 0.65 MG/DL (ref 0.52–1.04)
DIGOXIN SERPL-MCNC: 0.7 UG/L (ref 0.5–2)
EC STX1 GENE STL QL NAA+PROBE: NOT DETECTED
EC STX2 GENE STL QL NAA+PROBE: NOT DETECTED
ENTERIC PATHOGEN COMMENT: NORMAL
GFR SERPL CREATININE-BSD FRML MDRD: 80 ML/MIN/{1.73_M2}
GFR SERPL CREATININE-BSD FRML MDRD: 82 ML/MIN/{1.73_M2}
GLUCOSE SERPL-MCNC: 163 MG/DL (ref 70–99)
HGB BLD-MCNC: 10.2 G/DL (ref 11.7–15.7)
HGB BLD-MCNC: 11 G/DL (ref 11.7–15.7)
HGB BLD-MCNC: 11.1 G/DL (ref 11.7–15.7)
IRON SATN MFR SERPL: 18 % (ref 15–46)
IRON SERPL-MCNC: 29 UG/DL (ref 35–180)
LACTATE BLD-SCNC: 2.5 MMOL/L (ref 0.7–2)
LACTATE BLD-SCNC: 2.6 MMOL/L (ref 0.7–2)
MAGNESIUM SERPL-MCNC: 1.9 MG/DL (ref 1.6–2.3)
NOROV GI+II ORF1-ORF2 JNC STL QL NAA+PR: NOT DETECTED
PLATELET # BLD AUTO: 47 10E9/L (ref 150–450)
POTASSIUM SERPL-SCNC: 3.7 MMOL/L (ref 3.4–5.3)
RVA NSP5 STL QL NAA+PROBE: NOT DETECTED
SALMONELLA SP RPOD STL QL NAA+PROBE: NOT DETECTED
SHIGELLA SP+EIEC IPAH STL QL NAA+PROBE: NOT DETECTED
SODIUM SERPL-SCNC: 145 MMOL/L (ref 133–144)
TIBC SERPL-MCNC: 163 UG/DL (ref 240–430)
V CHOL+PARA RFBL+TRKH+TNAA STL QL NAA+PR: NOT DETECTED
Y ENTERO RECN STL QL NAA+PROBE: NOT DETECTED

## 2019-04-03 PROCEDURE — 25000128 H RX IP 250 OP 636: Performed by: PEDIATRICS

## 2019-04-03 PROCEDURE — 25000125 ZZHC RX 250: Performed by: HOSPITALIST

## 2019-04-03 PROCEDURE — 25000128 H RX IP 250 OP 636: Performed by: HOSPITALIST

## 2019-04-03 PROCEDURE — 85018 HEMOGLOBIN: CPT | Performed by: HOSPITALIST

## 2019-04-03 PROCEDURE — 80048 BASIC METABOLIC PNL TOTAL CA: CPT | Performed by: PEDIATRICS

## 2019-04-03 PROCEDURE — 83540 ASSAY OF IRON: CPT | Performed by: HOSPITALIST

## 2019-04-03 PROCEDURE — 25000132 ZZH RX MED GY IP 250 OP 250 PS 637: Performed by: HOSPITALIST

## 2019-04-03 PROCEDURE — 20000003 ZZH R&B ICU

## 2019-04-03 PROCEDURE — 85049 AUTOMATED PLATELET COUNT: CPT | Performed by: HOSPITALIST

## 2019-04-03 PROCEDURE — 83735 ASSAY OF MAGNESIUM: CPT | Performed by: PEDIATRICS

## 2019-04-03 PROCEDURE — 83605 ASSAY OF LACTIC ACID: CPT | Performed by: FAMILY MEDICINE

## 2019-04-03 PROCEDURE — 25000128 H RX IP 250 OP 636: Performed by: FAMILY MEDICINE

## 2019-04-03 PROCEDURE — 25000132 ZZH RX MED GY IP 250 OP 250 PS 637: Performed by: FAMILY MEDICINE

## 2019-04-03 PROCEDURE — 97116 GAIT TRAINING THERAPY: CPT | Mod: GP

## 2019-04-03 PROCEDURE — 94640 AIRWAY INHALATION TREATMENT: CPT

## 2019-04-03 PROCEDURE — 83550 IRON BINDING TEST: CPT | Performed by: HOSPITALIST

## 2019-04-03 PROCEDURE — 85018 HEMOGLOBIN: CPT | Performed by: FAMILY MEDICINE

## 2019-04-03 PROCEDURE — 83605 ASSAY OF LACTIC ACID: CPT | Performed by: PEDIATRICS

## 2019-04-03 PROCEDURE — 99233 SBSQ HOSP IP/OBS HIGH 50: CPT | Performed by: FAMILY MEDICINE

## 2019-04-03 PROCEDURE — 90662 IIV NO PRSV INCREASED AG IM: CPT | Performed by: PEDIATRICS

## 2019-04-03 PROCEDURE — 80162 ASSAY OF DIGOXIN TOTAL: CPT | Performed by: HOSPITALIST

## 2019-04-03 PROCEDURE — 25800030 ZZH RX IP 258 OP 636: Performed by: PEDIATRICS

## 2019-04-03 PROCEDURE — 82565 ASSAY OF CREATININE: CPT | Performed by: HOSPITALIST

## 2019-04-03 RX ADMIN — CEFTRIAXONE SODIUM 2 G: 2 INJECTION, POWDER, FOR SOLUTION INTRAMUSCULAR; INTRAVENOUS at 18:47

## 2019-04-03 RX ADMIN — INFLUENZA A VIRUS A/MICHIGAN/45/2015 X-275 (H1N1) ANTIGEN (FORMALDEHYDE INACTIVATED), INFLUENZA A VIRUS A/SINGAPORE/INFIMH-16-0019/2016 IVR-186 (H3N2) ANTIGEN (FORMALDEHYDE INACTIVATED), AND INFLUENZA B VIRUS B/MARYLAND/15/2016 BX-69A (A B/COLORADO/6/2017-LIKE VIRUS) ANTIGEN (FORMALDEHYDE INACTIVATED) 0.5 ML: 60; 60; 60 INJECTION, SUSPENSION INTRAMUSCULAR at 09:17

## 2019-04-03 RX ADMIN — METHYLPREDNISOLONE SODIUM SUCCINATE 81.25 MG: 125 INJECTION, POWDER, FOR SOLUTION INTRAMUSCULAR; INTRAVENOUS at 21:28

## 2019-04-03 RX ADMIN — BUDESONIDE 0.5 MG: 0.5 INHALANT RESPIRATORY (INHALATION) at 07:48

## 2019-04-03 RX ADMIN — METHYLPREDNISOLONE SODIUM SUCCINATE 81.25 MG: 125 INJECTION, POWDER, FOR SOLUTION INTRAMUSCULAR; INTRAVENOUS at 04:13

## 2019-04-03 RX ADMIN — Medication 12.5 MG: at 11:56

## 2019-04-03 RX ADMIN — SODIUM CHLORIDE 500 ML: 9 INJECTION, SOLUTION INTRAVENOUS at 13:05

## 2019-04-03 RX ADMIN — AZITHROMYCIN MONOHYDRATE 250 MG: 500 INJECTION, POWDER, LYOPHILIZED, FOR SOLUTION INTRAVENOUS at 09:17

## 2019-04-03 RX ADMIN — FAMOTIDINE 20 MG: 20 TABLET ORAL at 09:17

## 2019-04-03 RX ADMIN — METHYLPREDNISOLONE SODIUM SUCCINATE 81.25 MG: 125 INJECTION, POWDER, FOR SOLUTION INTRAMUSCULAR; INTRAVENOUS at 11:57

## 2019-04-03 RX ADMIN — Medication 12.5 MG: at 21:27

## 2019-04-03 RX ADMIN — FAMOTIDINE 20 MG: 20 TABLET ORAL at 21:27

## 2019-04-03 ASSESSMENT — ACTIVITIES OF DAILY LIVING (ADL)
ADLS_ACUITY_SCORE: 20

## 2019-04-03 ASSESSMENT — MIFFLIN-ST. JEOR: SCORE: 1200.75

## 2019-04-03 NOTE — PROGRESS NOTES
Pt's [facility] called to inquire about time of discharge and if there is a need for services vs TCU at discharge.  #[phone]

## 2019-04-03 NOTE — PROGRESS NOTES
DATE: 4/3/2019    TIME OF RECEIPT FROM LAB:  1520  LAB TEST:  REPEAT LACTIC  LAB VALUE:  Lactic-2.6  RESULTS GIVEN WITH READ-BACK TO (PROVIDER):  Dr. Allen, Charge RN updated  TIME LAB VALUE REPORTED TO PROVIDER:   1520  NEW ORDERS: None at this time.

## 2019-04-03 NOTE — PROGRESS NOTES
DATE:  4/3/2019   TIME OF RECEIPT FROM LAB:  1972  LAB TEST:  Lactic  LAB VALUE:  2.5  RESULTS GIVEN WITH READ-BACK TO (PROVIDER):  Dr. Payton  TIME LAB VALUE REPORTED TO PROVIDER:   6190

## 2019-04-03 NOTE — PROVIDER NOTIFICATION
Talked with Dr. Allen, he feels pt should be monitored in ICU tonight.  We will not move pt to M/S tonight.

## 2019-04-03 NOTE — PLAN OF CARE
Patient is alert and oriented.   SR while awake and SB at sleep, 0000 Cardizem dose held for HR of 53.  She is afebrile and BP is WNL.  LS are coarse and diminished.  Coarse crackles have also been noted, but seem to clear with coughing.  Cough is harsh and moist sounding, but non productive. Still awaiting sputum to send for culture.  She tolerates 3 L NC well, NC came out of patient's nose while asleep and her saturations dipped down to 87% on RA.  She has slept through the night, denies pain and offers no complaints at this time.

## 2019-04-03 NOTE — PROGRESS NOTES
Nurse Sepsis Evaluation Note    Any Sepsis BPA alert activated: YES  Action Taken: MD notified    Infection Present: known    Vital signs:  Temp: 98.9  F (37.2  C) Temp src: Oral BP: 144/75 Pulse: 66 Heart Rate: 63 Resp: 23 SpO2: 92 % O2 Device: Nasal cannula Oxygen Delivery: 1 LPM    LACTIC 2.5    Labs:  WBC   Date Value Ref Range Status   04/02/2019 2.2 (L) 4.0 - 11.0 10e9/L Final     Platelet Count   Date Value Ref Range Status   04/03/2019 47 (LL) 150 - 450 10e9/L Final     Comment:     This result has been called to DAISY BREEN RN by Darien Mulligan on 04 03 2019   at 0429, and has been read back.        Lactic Acid   Date Value Ref Range Status   03/31/2019 0.9 0.7 - 2.0 mmol/L Final     Creatinine   Date Value Ref Range Status   04/03/2019 0.62 0.52 - 1.04 mg/dL Final     INR   Date Value Ref Range Status   03/30/2019 1.14 0.86 - 1.14 Final     Bilirubin Total   Date Value Ref Range Status   04/02/2019 0.5 0.2 - 1.3 mg/dL Final     Repeat lactic acid ordered for 2 hours from previous lactate result      The patient is at baseline mental status.    The rest of their physical exam is significant for:    Blood cultures drawn prior to antibiotics: Not Applicable  The patient is currently on the following antibiotics:  Anti-infectives (From now, onward)    Start     Dose/Rate Route Frequency Ordered Stop    04/01/19 0900  azithromycin (ZITHROMAX) 250 mg in sodium chloride 0.9 % 250 mL intermittent infusion      250 mg  over 1 Hours Intravenous EVERY 24 HOURS 03/31/19 1725 04/05/19 0859    03/31/19 1800  cefTRIAXone (ROCEPHIN) 2 g vial to attach to  ml bag for ADULTS or NS 50 ml bag for PEDS      2 g  over 30 Minutes Intravenous EVERY 24 HOURS 03/31/19 1730              Fluid: Fluid bolus ordered  Bolus amount ordered normal saline 500 cc's.     Patient weight:   Wt Readings from Last 1 Encounters:   04/03/19 73.9 kg (162 lb 14.7 oz)         Were there delays in treatment? If yes, why? NO    Disposition: The  patient will remain on the current unit. We will continue to monitor this patient closely    Gustavo Palmer  April 3, 2019  1:28 PM

## 2019-04-03 NOTE — PROGRESS NOTES
[facility]    Medicine Progress Note - Hospitalist Service       Date of Admission:  3/30/2019  Assessment & Plan   Active Problems:    Shock (H) - probably septic and hypovolemic    Assessment: characterized by elevated lactic acid, acute kidney injury, acute encephalopathy, atrial fibrillation with RVR, thrombocytopenia, elevated troponins, acute respiratory failure with hypoxia and pneumonia found on imaging.  Patient has been on Rocephin and azithromycin and has been having progressive improvement with some signs of sepsis resolving (lactic acid normalizing, renal function returning to previous baseline) but continues to having worsening platelets, ongoing oxygen needs, intermittent atrial fibrillation episodes.      Plan: Continue with Rocephin and azithromycin for antibiotics and monitor for ongoing resolution of sepsis symptoms.  Will continue in ICU status and monitor closely for any recurrent atrial fibrillation with RVR.        Pneumonia of both lungs due to infectious organism    Assessment: Thought to be the cause of sepsis as above.  Patient on Rocephin and azithromycin with ongoing clinical improvement.  Still needing O2 supplementation at 3L to maintain O2 saturation     Plan: Continue with current antibiotics, wean O2 supplementation as tolerated, monitor for ongoing resolution of sepsis symptoms       Atrial fibrillation with RVR (H)    Assessment: patient continues to have intermittent episodes of atrial fibrillation with RVR with HR increasing into the 120-150 range when it is present but then converting to NSR again and is in the 50-60s.  At times the short acting diltiazem has been held secondary to HR in the low 50s.  Patient has also received Digoxen 500 mg IV x1 on 3/30 and another 250 IV x1 yesterday (4/2).      Plan: At this time given patient's age and overall condition I am hesitant to continue with digoxin however heart rate control is limiting the ability  to give oral diltiazem and patient continued to have an episode of A. fib with RVR overnight which thankfully only lasted a minute and a half and during which the patient was able to sleep through the episode.   will discuss with cardiologist on site when they arrive later this morning regarding management options going forward      Acute kidney injury (H)    Assessment: present initially and secondary to sepsis, now returned to previous baseline    Plan: Continue to monitor for resolution       Acute encephalopathy    Assessment: present initially and secondary to sepsis, resolving    Plan: Continue to monitor for progressive improvement      Thrombocytopenia (H)    Assessment: initially mild in the 130 range and appears to be chronic in nature, however has worsened significantly and is thought secondary to sepsis with platelets down to 47 this morning.  Patient is not on heparin or Lovenox.      Plan: Will continue to monitor, continue treatment of sepsis as above, monitor closely for any concern of active bleeding.        Acute respiratory failure with hypoxia (H)    Assessment: secondary to pneumonia and sepsis, patient currently still needing 3L NC oxygen to maintain saturations    Plan: Will continue treatment of the pneumonia as above and titrate oxygen as able.        Demand ischemia of myocardium (H)    Assessment: secondary to sepsis and atrial fibrillation with RVR.  Troponins are trending downward as HR control improves overall and sepsis resolves.  Echo performed during this hospitalization shows normal EF    Plan: No further routine monitoring as needed at this time      Pyuria    Assessment: urine culture growing out mixed mehnaz    Plan: no further treatment needed      Tobacco use    Assessment: noted, patient not currently on nicotine replacement    Plan: Will continue to monitor and offer if needed      Acidosis    Assessment: present initially, thought secondary to elevated lactic acid.  Improving  as sepsis is resolving    Plan: no further monitoring is needed      Diarrhea    Assessment:. Noted initially and patient still having occasional loose stool  Occult stool is positive for blood but hemoglobins have been stable.  C. difficile testing is pending    Plan: We will continue with enteric precautions and C. difficile testing is currently in process.  We will continue to monitor hemoglobin and deal with anemia as noted below         Anemia    Assessment:  Hemoglobin was normal initially, decreased to 10.6 following fluid resuscitation and was 11.2 yesterday showing overall stability, however does have positive occult stool as above.    Plan:  Will add on hemoglobin to this mornings labs and ensure ongoing stabilization.  If stability is still present during this hospitalization will be able to follow up with outpatient EGD and colonoscopy as desired by the patient for further evaluation.      Diet: Regular Diet Adult    DVT Prophylaxis: Pneumatic Compression Devices  Ortiz Catheter: not present  Code Status: Full Code      Disposition Plan   Expected discharge: 2-4 days, recommended to TCU once adequate pain management/ tolerating PO medications, antibiotic plan established, hemoglobin stable, safe disposition plan/ TCU bed available and SIRS/Sepsis treated.  Entered: Magalys Payton MD 04/03/2019, 7:19 AM       The patient's care was discussed with the Bedside Nurse and Patient.    Magalys Payton MD  Hospitalist Service  [facility]    ______________________________________________________________________    Interval History   Patient continues to have intermittent atrial fibrillation episodes which cause HR to increase into the 120-150 range.  Did have another brief episode overnight that converted fairly quickly.  HR is otherwise in the 50-60s and diltiazem having to be held intermittently secondary to HR in the low 50 range.  Patient continuing to  need 3L NC oxygen but this morning was in the upper 90s and was able to wean to 1L during this visit with O2 saturations 92-93%.  No new concerns.      Data reviewed today: I reviewed all medications, new labs and imaging results over the last 24 hours.    Physical Exam   Vital Signs: Temp: 97.7  F (36.5  C) Temp src: Oral BP: 122/58 Pulse: 60 Heart Rate: 56 Resp: 27 SpO2: 92 % O2 Device: Nasal cannula Oxygen Delivery: 3 LPM  Weight: 162 lbs 14.72 oz  Constitutional: awake, alert, cooperative, no apparent distress, and appears stated age  Respiratory: No increased work of breathing, decreased air exchange, mild crackles in bilateral bases   Cardiovascular: normal sinus rhythm with HR in the low 60 to low 70 range during this visit   GI: bowel sounds present, abdomen soft and non-tender  Skin: normal skin color, texture, turgor  Musculoskeletal: no lower extremity pitting edema present  Neurologic: Awake, alert, oriented to name, place and time and overall to current situation.     Data   Recent Labs   Lab 04/03/19  0421 04/03/19  0400 04/02/19  1115 04/02/19  1020 04/01/19  0510 03/31/19  1840  03/30/19  1740 03/30/19  0927   WBC  --   --   --  2.2*  --   --   --   --  6.3   HGB  --  11.0*  --  11.2* 10.6*  --   --   --  14.5   MCV  --   --   --  85  --   --   --   --  85   PLT  --  47*  --  59*  --   --   --   --  130*   INR  --   --   --   --   --   --   --  1.14  --    *  --   --  146* 144  --    < >  --  136   POTASSIUM 3.7  --   --  3.4 3.8  --    < >  --  4.7   CHLORIDE 110*  --   --  110* 108  --    < >  --  102   CO2 25  --   --  28 29  --    < >  --  23   BUN 12  --   --  6* 6*  --    < >  --  24   CR 0.62 0.65  --  0.61 0.67  --    < >  --  1.49*   ANIONGAP 10  --   --  8 7  --    < >  --  11   TNOY 7.9*  --   --  7.8* 7.7*  --    < >  --  8.8   *  --   --  117* 89  --    < >  --  129*   ALBUMIN  --   --   --  2.8* 2.9*  --   --   --  4.0   PROTTOTAL  --   --   --  5.9* 5.9*  --   --   --  7.8    BILITOTAL  --   --   --  0.5 0.4  --   --   --  0.9   ALKPHOS  --   --   --  54 49  --   --   --  72   ALT  --   --   --  22 25  --   --   --  29   AST  --   --   --  36 45  --   --   --  48*   TROPI  --   --  0.137*  --  0.335* 0.534*   < > 0.459* 0.092*    < > = values in this interval not displayed.

## 2019-04-03 NOTE — PLAN OF CARE
Discharge Planner PT     Patient plan for discharge: TCU  Current status: Pt is 86 yo female admitted with dx of rapid A-fib and SIRS. Lives with adult child and adult grandson in trailer home with 4 stairs to enter with B HR. PLOF was IND with self cares and mobility. She notes that she will occasionally use her cane if needed, but for the most part does not utilize assistive device for mobility. Currently pt is IND with bed mobility and SBA with sit <> stand transfers with FWW with noted drop in O2 sats on 1L O2 NC  to 90-93% and HR at 70-80 bpm with activity today. Patient ambulated with 2WW and SBA 5 steps forward, 5 steps backwards x 2 sets. Patient then ambulated with lateral stepping at EOB 5 steps each direction. Patient requested seated rest break due to fatigue. Patient then performed sit to stand x2 reps. Increased fatigue and SOB at end of treatment today.  Barriers to return to prior living situation: Level of assist for mobility, activity tolerance  Recommendations for discharge: TCU  Rationale for recommendations: Pt will benefit from TCU placement to build strength and endurance for improved safety and independence with functional mobility at home.              Entered by: Maria Isabel Carmona 04/03/2019 2:26 PM

## 2019-04-03 NOTE — PROGRESS NOTES
[facility]    Sepsis Evaluation Progress Note    Date of Service: 04/03/2019    I was called to see Gustavo Palmer due to abnormal vital signs triggering the Sepsis SIRS screening alert. She is known to have an infection.     Physical Exam    Vital Signs:  Temp: 98.9  F (37.2  C) Temp src: Oral BP: 144/75 Pulse: 66 Heart Rate: 63 Resp: 23 SpO2: 92 % O2 Device: Nasal cannula Oxygen Delivery: 1 LPM    Lab:  Lactic Acid   Date Value Ref Range Status   03/31/2019 0.9 0.7 - 2.0 mmol/L Final     Lactate for Sepsis Protocol   Date Value Ref Range Status   04/03/2019 2.5 (H) 0.7 - 2.0 mmol/L Final     Comment:     Significant value called to and read back by  NORM BARLOW IN ICU AT 1225 ON 4/3/19 DC         The patient is at baseline mental status.    The rest of their physical exam is significant for no significant change from exam earlier today.  HR is in the 50-60s, lungs have crackles but unchanged from previous, patient is alert or oriented, talking without difficulty with O2 sats in the 92-93% range on 1L and patient is sitting up and eating her lunch without difficulty.    Assessment and Plan    The SIRS and exam findings are likely due to   sepsis.     ID: The patient is currently on the following antibiotics:  Anti-infectives (From now, onward)    Start     Dose/Rate Route Frequency Ordered Stop    04/01/19 0900  azithromycin (ZITHROMAX) 250 mg in sodium chloride 0.9 % 250 mL intermittent infusion      250 mg  over 1 Hours Intravenous EVERY 24 HOURS 03/31/19 1725 04/05/19 0859    03/31/19 1800  cefTRIAXone (ROCEPHIN) 2 g vial to attach to  ml bag for ADULTS or NS 50 ml bag for PEDS      2 g  over 30 Minutes Intravenous EVERY 24 HOURS 03/31/19 1730          Current antibiotic coverage is appropriate for source of infection.    Fluid: Fluid bolus ordered.    Lab: Repeat lactic acid ordered for 2 hours from now.    Disposition: The patient will remain on the current unit. We will  continue to monitor this patient closely.    Magalys Payton MD

## 2019-04-03 NOTE — PROGRESS NOTES
Care Transitions has visited with patient regarding current recommendation for TCU placement.  Medicare guidelines reviewed for SNF coverage.  Patient was provided with Medicare certified nursing home list. Pts choices are as follows: [facility] (Admissions: [phone] Main Phone: [phone] Fax: [phone]) and [facility] (Main Phone: [phone] Admissions Phone: [phone] Fax: [phone]).    Discussed potential transport options for day of discharge.  Patient may need agency transport versus transport by family.      Care Transitions will continue to follow to assist with discharge planning.

## 2019-04-04 ENCOUNTER — APPOINTMENT (OUTPATIENT)
Dept: PHYSICAL THERAPY | Facility: CLINIC | Age: 84
DRG: 871 | End: 2019-04-04
Payer: COMMERCIAL

## 2019-04-04 LAB
ANION GAP SERPL CALCULATED.3IONS-SCNC: 6 MMOL/L (ref 3–14)
BUN SERPL-MCNC: 17 MG/DL (ref 7–30)
CALCIUM SERPL-MCNC: 8.1 MG/DL (ref 8.5–10.1)
CHLORIDE SERPL-SCNC: 110 MMOL/L (ref 94–109)
CO2 SERPL-SCNC: 28 MMOL/L (ref 20–32)
CREAT SERPL-MCNC: 0.62 MG/DL (ref 0.52–1.04)
ERYTHROCYTE [DISTWIDTH] IN BLOOD BY AUTOMATED COUNT: 14.2 % (ref 10–15)
GFR SERPL CREATININE-BSD FRML MDRD: 82 ML/MIN/{1.73_M2}
GLUCOSE SERPL-MCNC: 145 MG/DL (ref 70–99)
HCT VFR BLD AUTO: 32 % (ref 35–47)
HGB BLD-MCNC: 10.3 G/DL (ref 11.7–15.7)
LACTATE BLD-SCNC: 1.6 MMOL/L (ref 0.7–2)
MCH RBC QN AUTO: 27.4 PG (ref 26.5–33)
MCHC RBC AUTO-ENTMCNC: 32.2 G/DL (ref 31.5–36.5)
MCV RBC AUTO: 85 FL (ref 78–100)
PLATELET # BLD AUTO: 82 10E9/L (ref 150–450)
POTASSIUM SERPL-SCNC: 3.5 MMOL/L (ref 3.4–5.3)
RBC # BLD AUTO: 3.76 10E12/L (ref 3.8–5.2)
SODIUM SERPL-SCNC: 144 MMOL/L (ref 133–144)
WBC # BLD AUTO: 3.7 10E9/L (ref 4–11)

## 2019-04-04 PROCEDURE — 97530 THERAPEUTIC ACTIVITIES: CPT | Mod: GP | Performed by: PHYSICAL THERAPIST

## 2019-04-04 PROCEDURE — 99232 SBSQ HOSP IP/OBS MODERATE 35: CPT | Performed by: FAMILY MEDICINE

## 2019-04-04 PROCEDURE — 25000132 ZZH RX MED GY IP 250 OP 250 PS 637: Performed by: FAMILY MEDICINE

## 2019-04-04 PROCEDURE — 12000000 ZZH R&B MED SURG/OB

## 2019-04-04 PROCEDURE — 25000128 H RX IP 250 OP 636: Performed by: FAMILY MEDICINE

## 2019-04-04 PROCEDURE — 85027 COMPLETE CBC AUTOMATED: CPT | Performed by: FAMILY MEDICINE

## 2019-04-04 PROCEDURE — 25800030 ZZH RX IP 258 OP 636: Performed by: FAMILY MEDICINE

## 2019-04-04 PROCEDURE — 80048 BASIC METABOLIC PNL TOTAL CA: CPT | Performed by: FAMILY MEDICINE

## 2019-04-04 PROCEDURE — 83605 ASSAY OF LACTIC ACID: CPT | Performed by: FAMILY MEDICINE

## 2019-04-04 PROCEDURE — 25000128 H RX IP 250 OP 636: Performed by: HOSPITALIST

## 2019-04-04 PROCEDURE — 25000131 ZZH RX MED GY IP 250 OP 636 PS 637: Performed by: FAMILY MEDICINE

## 2019-04-04 RX ORDER — CEFDINIR 300 MG/1
300 CAPSULE ORAL EVERY 12 HOURS SCHEDULED
Status: DISCONTINUED | OUTPATIENT
Start: 2019-04-04 | End: 2019-04-05 | Stop reason: HOSPADM

## 2019-04-04 RX ORDER — PREDNISONE 20 MG/1
40 TABLET ORAL DAILY
Status: DISCONTINUED | OUTPATIENT
Start: 2019-04-04 | End: 2019-04-05 | Stop reason: HOSPADM

## 2019-04-04 RX ORDER — HEPARIN SODIUM,PORCINE 10 UNIT/ML
5-10 VIAL (ML) INTRAVENOUS EVERY 24 HOURS
Status: DISCONTINUED | OUTPATIENT
Start: 2019-04-04 | End: 2019-04-05 | Stop reason: HOSPADM

## 2019-04-04 RX ORDER — AZITHROMYCIN 250 MG/1
250 TABLET, FILM COATED ORAL DAILY
Status: DISCONTINUED | OUTPATIENT
Start: 2019-04-04 | End: 2019-04-05 | Stop reason: HOSPADM

## 2019-04-04 RX ORDER — LIDOCAINE 40 MG/G
CREAM TOPICAL
Status: DISCONTINUED | OUTPATIENT
Start: 2019-04-04 | End: 2019-04-05 | Stop reason: HOSPADM

## 2019-04-04 RX ORDER — HEPARIN SODIUM,PORCINE 10 UNIT/ML
5-10 VIAL (ML) INTRAVENOUS
Status: DISCONTINUED | OUTPATIENT
Start: 2019-04-04 | End: 2019-04-05 | Stop reason: HOSPADM

## 2019-04-04 RX ADMIN — Medication 12.5 MG: at 20:37

## 2019-04-04 RX ADMIN — AZITHROMYCIN 250 MG: 250 TABLET, FILM COATED ORAL at 09:03

## 2019-04-04 RX ADMIN — CEFDINIR 300 MG: 300 CAPSULE ORAL at 19:25

## 2019-04-04 RX ADMIN — Medication 12.5 MG: at 09:03

## 2019-04-04 RX ADMIN — METHYLPREDNISOLONE SODIUM SUCCINATE 81.25 MG: 125 INJECTION, POWDER, FOR SOLUTION INTRAMUSCULAR; INTRAVENOUS at 05:16

## 2019-04-04 RX ADMIN — PREDNISONE 40 MG: 20 TABLET ORAL at 09:02

## 2019-04-04 RX ADMIN — OMEPRAZOLE 20 MG: 20 CAPSULE, DELAYED RELEASE ORAL at 09:03

## 2019-04-04 RX ADMIN — SODIUM CHLORIDE: 9 INJECTION, SOLUTION INTRAVENOUS at 19:35

## 2019-04-04 RX ADMIN — CEFDINIR 300 MG: 300 CAPSULE ORAL at 09:03

## 2019-04-04 RX ADMIN — HEPARIN, PORCINE (PF) 10 UNIT/ML INTRAVENOUS SYRINGE 5 ML: at 19:24

## 2019-04-04 ASSESSMENT — MIFFLIN-ST. JEOR: SCORE: 1192.75

## 2019-04-04 ASSESSMENT — ACTIVITIES OF DAILY LIVING (ADL)
ADLS_ACUITY_SCORE: 20
ADLS_ACUITY_SCORE: 16
ADLS_ACUITY_SCORE: 20

## 2019-04-04 NOTE — PLAN OF CARE
Afebrile today.  Tele SB-SR, HR-50's-70's today.  Denies pain.  Tolerating po.  Voiding without difficulty.  Had a couple loose BM's today.  Up to BSC with SBA.  Central line drsg changed today.  Lactic triggered and bolus given, see previous note.

## 2019-04-04 NOTE — PROGRESS NOTES
Care Transitions has called and left a message for admissions at [facility] to inquire if they will have a TCU bed for this patient tomorrow.  Will await call back.      1502- [facility] called back earlier today and stated that they are able to accept patient for TCU placement tomorrow, as long as patient reads there no smoking policy and signs the no smoking contract.  Contract was faxed to writer.     Writer visited with patient and she did sign the no smoking contract for [facility].  Writer faxed it back to [facility].  Patient is interested in getting a nicotine patch placed prior to her discharge.  Writer has notified the doctor regarding this.      Patient requesting van transport.  [facility] is available tomorrow for transport at 1030.  They will bill patient's insurance for the transport.  [facility] is aware of the planned discharge time for tomorrow.

## 2019-04-04 NOTE — PROGRESS NOTES
SPIRITUAL HEALTH SERVICES  SPIRITUAL ASSESSMENT Progress Note  [facility]      (Follow up) - Pt indicated she might be discharged tomorrow.  When  commented on the stuffed animal (a cat), on her bed stand, she talked about missing her two cats, Lion & Mouse, but said he daughter & grandson were taking good care of them.   provided supportive listening and a prayer.   is available for pt/family needs.    Tim Diaz M.Div., [facility]  Staff   Office tel: [phone]

## 2019-04-04 NOTE — PROGRESS NOTES
[facility]    Medicine Progress Note - Hospitalist Service       Date of Admission:  3/30/2019  Assessment & Plan     Active Problems:    Shock (H) - probably septic and hypovolemic    Assessment: characterized by elevated lactic acid, acute kidney injury, acute encephalopathy, atrial fibrillation with RVR, thrombocytopenia, elevated troponins, acute respiratory failure with hypoxia and pneumonia found on imaging.  Patient has been on Rocephin and azithromycin and has been having progressive improvement with some signs of sepsis resolving (lactic acid normalizing, renal function returning to previous baseline, platelets starting to increase, decreased oxygen needs, no atrial fibrillation episodes over the past 24 hours).      Plan: Transition to Omnicef and azithromycin PO for antibiotics and monitor for ongoing resolution of sepsis symptoms.  Will transition patient to the general medical floor today, continue physical therapy and wean O2 supplementation as tolerated.  Continue to plan for discharge planning - possible discharge to TCU tomorrow if patient continues to improve with transition to PO medications       Pneumonia of both lungs due to infectious organism    Assessment: Thought to be the cause of sepsis as above.  Patient on Rocephin and azithromycin with ongoing clinical improvement.  Still needing O2 supplementation at 1-2L to maintain O2 saturation     Plan: Transition to PO Omnicef and Azithromax, wean O2 supplementation as tolerated, monitor for ongoing resolution of sepsis symptoms       Atrial fibrillation with RVR (H)    Assessment: patient had been having intermittent episodes of atrial fibrillation with RVR with HR increasing into the 120-150 however was transitioned to metoprolol 12.5 mg BID with approval from cardiology to allow resting heart rate less than 60 as long as patient is asymptomatic.  Patient's HR has been in the 50-70s without atrial fibrillation  since this transition and patient tolerating the medication without side effect.    Plan:  Will continue with the metoprolol 12.5 mg BID, continue telemetry monitoring at this time for any further atrial fibrillation episodes.  Cardiology had recommended consideration of Holter monitor vs Zio patch in the future following resolution of pneumonia to assess for further episodes in the future and need for anticoagulation consideration if ongoing paroxysmal episodes occur.        Acute kidney injury (H)    Assessment: present initially and secondary to sepsis, now returned to previous baseline    Plan: Continue to monitor for resolution       Acute encephalopathy    Assessment: present initially and secondary to sepsis, resolving    Plan: Continue to monitor for progressive improvement      Thrombocytopenia (H)    Assessment: initially mild in the 130 range and appears to be chronic in nature, however has worsened significantly and is thought secondary to sepsis with platelets down to 47 at lowest point, now starting to increase and 82 at this time.  Patient is not on heparin or Lovenox.      Plan: Will continue to monitor, continue treatment of sepsis as above, monitor closely for any concern of active bleeding.        Acute respiratory failure with hypoxia (H)    Assessment: secondary to pneumonia and sepsis, patient currently still needing 1-2L NC oxygen to maintain saturations    Plan: Will continue treatment of the pneumonia as above and titrate oxygen as able.        Demand ischemia of myocardium (H)    Assessment: secondary to sepsis and atrial fibrillation with RVR.  Troponins are trending downward as HR control improves overall and sepsis resolves.  Echo performed during this hospitalization shows normal EF    Plan: No further routine monitoring as needed at this time      Pyuria    Assessment: urine culture growing out mixed mehnaz    Plan: no further treatment needed      Tobacco use    Assessment: noted,  patient not currently on nicotine replacement    Plan: Will continue to monitor and offer if needed      Diarrhea    Assessment:. Noted initially and patient still having occasional loose stool  Occult stool is positive for blood but hemoglobins have been stable.  Enteric stool testing was negative.  Stool frequency has decreased, patient has been on pepcid.      Plan: We will continue to monitor hemoglobin and deal with anemia as noted      Anemia    Assessment:  Hemoglobin was normal initially, decreased to 10.6 following fluid resuscitation but has stabilized in the low 10 range.  She does have positive occult stool as above.    Plan: Continue to monitor hemoglobin daily to ensure ongoing stabilization.  Omeprazole has been started.  If hemoglobin stability is still present during this hospitalization will be able to follow up with outpatient EGD and colonoscopy as desired by the patient for further evaluation.            Diet: Regular Diet Adult    DVT Prophylaxis: Pneumatic Compression Devices  Ortiz Catheter: not present  Code Status: Full Code      Disposition Plan   Expected discharge: 1 - 2 days, recommended to TCU once adequate pain management/ tolerating PO medications, antibiotic plan established, safe disposition plan/ TCU bed available and SIRS/Sepsis treated.  Entered: Magalys Payton MD 04/04/2019, 6:53 AM       The patient's care was discussed with the Bedside Nurse and Patient.    Magalys Payton MD  Hospitalist Service  [facility]    ______________________________________________________________________    Interval History   Patient has been vitally stable overnight, no episodes of atrial fibrillation occurred.  HR has been in the mid 50 to 70s on low dose metoprolol.  Patient has been 1-2L NC oxygen and tolerating that well.  Tolerating medications without significant side effects.  No new concerns.      Data reviewed today: I reviewed all  medications, new labs and imaging results over the last 24 hours.    Physical Exam   Vital Signs: Temp: 98.7  F (37.1  C) Temp src: Oral BP: 139/64 Pulse: 62 Heart Rate: 62 Resp: 18 SpO2: 94 % O2 Device: Nasal cannula Oxygen Delivery: 1 LPM  Weight: 161 lbs 2.5 oz  Constitutional: awake, alert, cooperative, no apparent distress, and appears stated age  Respiratory: No increased work of breathing, good air exchange, mild crackles in bilateral bases still present, no wheezing  Cardiovascular: Normal apical impulse, regular rate and rhythm  GI: bowel sounds present, abdomen soft and non-tender  Skin: normal skin color, texture, turgor  Musculoskeletal: no lower extremity pitting edema present  Neurologic: Awake, alert, oriented to name, place and time    Data   Recent Labs   Lab 04/04/19  0440 04/03/19  2200 04/03/19  1520 04/03/19  0421 04/03/19  0400 04/02/19  1115 04/02/19  1020 04/01/19  0510 03/31/19  1840  03/30/19  1740 03/30/19  0927   WBC 3.7*  --   --   --   --   --  2.2*  --   --   --   --  6.3   HGB 10.3* 10.2* 11.1*  --  11.0*  --  11.2* 10.6*  --   --   --  14.5   MCV 85  --   --   --   --   --  85  --   --   --   --  85   PLT 82*  --   --   --  47*  --  59*  --   --   --   --  130*   INR  --   --   --   --   --   --   --   --   --   --  1.14  --      --   --  145*  --   --  146* 144  --    < >  --  136   POTASSIUM 3.5  --   --  3.7  --   --  3.4 3.8  --    < >  --  4.7   CHLORIDE 110*  --   --  110*  --   --  110* 108  --    < >  --  102   CO2 28  --   --  25  --   --  28 29  --    < >  --  23   BUN 17  --   --  12  --   --  6* 6*  --    < >  --  24   CR 0.62  --   --  0.62 0.65  --  0.61 0.67  --    < >  --  1.49*   ANIONGAP 6  --   --  10  --   --  8 7  --    < >  --  11   TONY 8.1*  --   --  7.9*  --   --  7.8* 7.7*  --    < >  --  8.8   *  --   --  163*  --   --  117* 89  --    < >  --  129*   ALBUMIN  --   --   --   --   --   --  2.8* 2.9*  --   --   --  4.0   PROTTOTAL  --   --   --    --   --   --  5.9* 5.9*  --   --   --  7.8   BILITOTAL  --   --   --   --   --   --  0.5 0.4  --   --   --  0.9   ALKPHOS  --   --   --   --   --   --  54 49  --   --   --  72   ALT  --   --   --   --   --   --  22 25  --   --   --  29   AST  --   --   --   --   --   --  36 45  --   --   --  48*   TROPI  --   --   --   --   --  0.137*  --  0.335* 0.534*   < > 0.459* 0.092*    < > = values in this interval not displayed.

## 2019-04-04 NOTE — PROGRESS NOTES
Discharge Planner   Discharge Plans in progress: Yes. Plan for patient to discharge to [facility] 3/5/19.  [facility] to transport at 1030.  [facility] is aware of the discharge time.     Barriers to discharge plan: none     Follow up plan: Per the TCU        Entered by: CHRISTA HUERTA 04/04/2019 3:09 PM

## 2019-04-04 NOTE — PROVIDER NOTIFICATION
Talked with Dr. Allen about parameters for giving lopressor, he said there will be none and to give it regardless of HR.  It is to control AF.

## 2019-04-04 NOTE — PLAN OF CARE
O2 turned off this AM at 0730.  Pt has been on RA all shift and maintaining sats greater than 88%.  Lactic triggered this AM -1.6, Dr Payton notified.  Instructed pt on Incentive spirometer use and encouraged pt to use it every hour.  Switched to po zithromax, omnicef, and prednisone today.  Tele SB/SR with PVC's.  Couple loose stools this shift.  Up to BSC with one assist.  Appetite good. Will move to medsu when staffing/bed availability permits.

## 2019-04-04 NOTE — PROGRESS NOTES
Pt is changed to Medsurg status but will remain in ICU room 215 as medsurg overflow at this time due to bed availability/staffing on medsurg. Pt updated on POC.  Charge RN updated.

## 2019-04-04 NOTE — PLAN OF CARE
Patient alert and oriented, sinus rhythm to sinus shahzad on telemetry, fewer PVCs and PACs noted tonight compared to last night.  slightly hypertensive at one point, now WNL.   Weight is stable, UOP adequate, one urine/mixed output, appeared more urine than not.   Skin is intact, areas of excoriation improved, now just slightly reddened.   Patient denies pain.   LS are diminished, sounding closer to clear than not. Still become dyspneic with exertion.  No sputum produced for culture.   She offers no complaints at this time.

## 2019-04-04 NOTE — PLAN OF CARE
Discharge Planner PT   Patient plan for discharge: TCU  Current status: Pt is 86 yo female admitted with dx of rapid A-fib and SIRS. Reviewed pt's supine LE strengthening exercise with good recall. Pt independent with bed mobility. Pt completed forward and retro ambulation with front wheeled walker 10ft in room 2x with contact guard assist for safety and line management. Pt required seated rest breaks between sets due to fatigue and shortness of breath. Followed by 5x sit to stand with supervision for safety and 2x10 reps of seated LAQ with bilateral lower extremities. Pt was on room air with SpO2 89-91% and HR at 70-85bpm with interventions performed today. Ended session with pt in bed with HOB elevated and all needs within reach.  Barriers to return to prior living situation: activity tolerance  Recommendations for discharge: TCU  Rationale for recommendations: Pt will benefit from TCU placement to build strength and endurance for improved safety and independence with functional mobility at home.        Entered by: Mary Clements 04/04/2019 2:14 PM

## 2019-04-05 ENCOUNTER — HOSPITAL ENCOUNTER (OUTPATIENT)
Dept: CARDIOLOGY | Facility: CLINIC | Age: 84
Discharge: HOME | DRG: 871 | End: 2019-04-05
Attending: FAMILY MEDICINE | Admitting: FAMILY MEDICINE
Payer: COMMERCIAL

## 2019-04-05 VITALS
OXYGEN SATURATION: 89 % | TEMPERATURE: 95.8 F | BODY MASS INDEX: 25.9 KG/M2 | WEIGHT: 161.16 LBS | HEIGHT: 66 IN | DIASTOLIC BLOOD PRESSURE: 79 MMHG | HEART RATE: 70 BPM | SYSTOLIC BLOOD PRESSURE: 148 MMHG | RESPIRATION RATE: 24 BRPM

## 2019-04-05 DIAGNOSIS — I48.0 PAROXYSMAL ATRIAL FIBRILLATION (H): ICD-10-CM

## 2019-04-05 LAB
ANION GAP SERPL CALCULATED.3IONS-SCNC: 6 MMOL/L (ref 3–14)
BACTERIA SPEC CULT: NO GROWTH
BACTERIA SPEC CULT: NO GROWTH
BUN SERPL-MCNC: 19 MG/DL (ref 7–30)
CALCIUM SERPL-MCNC: 7.8 MG/DL (ref 8.5–10.1)
CHLORIDE SERPL-SCNC: 111 MMOL/L (ref 94–109)
CO2 SERPL-SCNC: 27 MMOL/L (ref 20–32)
CREAT SERPL-MCNC: 0.62 MG/DL (ref 0.52–1.04)
ERYTHROCYTE [DISTWIDTH] IN BLOOD BY AUTOMATED COUNT: 14 % (ref 10–15)
GFR SERPL CREATININE-BSD FRML MDRD: 82 ML/MIN/{1.73_M2}
GLUCOSE SERPL-MCNC: 112 MG/DL (ref 70–99)
HCT VFR BLD AUTO: 31.5 % (ref 35–47)
HGB BLD-MCNC: 10.2 G/DL (ref 11.7–15.7)
Lab: NORMAL
Lab: NORMAL
MCH RBC QN AUTO: 27.3 PG (ref 26.5–33)
MCHC RBC AUTO-ENTMCNC: 32.4 G/DL (ref 31.5–36.5)
MCV RBC AUTO: 84 FL (ref 78–100)
PLATELET # BLD AUTO: 100 10E9/L (ref 150–450)
POTASSIUM SERPL-SCNC: 3.1 MMOL/L (ref 3.4–5.3)
RBC # BLD AUTO: 3.74 10E12/L (ref 3.8–5.2)
SODIUM SERPL-SCNC: 144 MMOL/L (ref 133–144)
SPECIMEN SOURCE: NORMAL
SPECIMEN SOURCE: NORMAL
TROPONIN I SERPL-MCNC: 0.04 UG/L (ref 0–0.04)
WBC # BLD AUTO: 5 10E9/L (ref 4–11)

## 2019-04-05 PROCEDURE — 99239 HOSP IP/OBS DSCHRG MGMT >30: CPT | Performed by: FAMILY MEDICINE

## 2019-04-05 PROCEDURE — 84484 ASSAY OF TROPONIN QUANT: CPT | Performed by: FAMILY MEDICINE

## 2019-04-05 PROCEDURE — 80048 BASIC METABOLIC PNL TOTAL CA: CPT | Performed by: FAMILY MEDICINE

## 2019-04-05 PROCEDURE — 85027 COMPLETE CBC AUTOMATED: CPT | Performed by: FAMILY MEDICINE

## 2019-04-05 PROCEDURE — 25000132 ZZH RX MED GY IP 250 OP 250 PS 637: Performed by: FAMILY MEDICINE

## 2019-04-05 PROCEDURE — 0296T ZIO PATCH HOLTER ADULT PEDIATRIC GREATER THAN 48 HRS: CPT

## 2019-04-05 RX ORDER — CEFDINIR 300 MG/1
300 CAPSULE ORAL EVERY 12 HOURS
DISCHARGE
Start: 2019-04-05 | End: 2019-04-10

## 2019-04-05 RX ORDER — POTASSIUM CHLORIDE 1500 MG/1
20-40 TABLET, EXTENDED RELEASE ORAL
Status: DISCONTINUED | OUTPATIENT
Start: 2019-04-05 | End: 2019-04-05 | Stop reason: HOSPADM

## 2019-04-05 RX ORDER — POTASSIUM CL/LIDO/0.9 % NACL 10MEQ/0.1L
10 INTRAVENOUS SOLUTION, PIGGYBACK (ML) INTRAVENOUS
Status: DISCONTINUED | OUTPATIENT
Start: 2019-04-05 | End: 2019-04-05 | Stop reason: HOSPADM

## 2019-04-05 RX ORDER — NICOTINE 21 MG/24HR
1 PATCH, TRANSDERMAL 24 HOURS TRANSDERMAL DAILY
Status: DISCONTINUED | OUTPATIENT
Start: 2019-04-05 | End: 2019-04-05 | Stop reason: HOSPADM

## 2019-04-05 RX ORDER — POTASSIUM CHLORIDE 7.45 MG/ML
10 INJECTION INTRAVENOUS
Status: DISCONTINUED | OUTPATIENT
Start: 2019-04-05 | End: 2019-04-05 | Stop reason: HOSPADM

## 2019-04-05 RX ORDER — ALBUTEROL SULFATE 0.83 MG/ML
2.5 SOLUTION RESPIRATORY (INHALATION) EVERY 4 HOURS PRN
DISCHARGE
Start: 2019-04-05 | End: 2022-07-05

## 2019-04-05 RX ORDER — AZITHROMYCIN 250 MG/1
250 TABLET, FILM COATED ORAL DAILY
DISCHARGE
Start: 2019-04-06 | End: 2019-04-08

## 2019-04-05 RX ORDER — PREDNISONE 20 MG/1
40 TABLET ORAL DAILY
DISCHARGE
Start: 2019-04-05 | End: 2019-04-25

## 2019-04-05 RX ORDER — POTASSIUM CHLORIDE 29.8 MG/ML
20 INJECTION INTRAVENOUS
Status: DISCONTINUED | OUTPATIENT
Start: 2019-04-05 | End: 2019-04-05 | Stop reason: HOSPADM

## 2019-04-05 RX ORDER — LOPERAMIDE HYDROCHLORIDE 2 MG/1
2 TABLET ORAL 4 TIMES DAILY PRN
DISCHARGE
Start: 2019-04-05 | End: 2019-09-04

## 2019-04-05 RX ORDER — NICOTINE 21 MG/24HR
1 PATCH, TRANSDERMAL 24 HOURS TRANSDERMAL DAILY
DISCHARGE
Start: 2019-04-05 | End: 2019-04-08

## 2019-04-05 RX ORDER — METOPROLOL TARTRATE 25 MG/1
12.5 TABLET, FILM COATED ORAL 2 TIMES DAILY
DISCHARGE
Start: 2019-04-05 | End: 2019-04-25

## 2019-04-05 RX ORDER — POTASSIUM CHLORIDE 1.5 G/1.58G
20-40 POWDER, FOR SOLUTION ORAL
Status: DISCONTINUED | OUTPATIENT
Start: 2019-04-05 | End: 2019-04-05 | Stop reason: HOSPADM

## 2019-04-05 RX ADMIN — POTASSIUM CHLORIDE 40 MEQ: 1500 TABLET, EXTENDED RELEASE ORAL at 07:56

## 2019-04-05 RX ADMIN — OMEPRAZOLE 20 MG: 20 CAPSULE, DELAYED RELEASE ORAL at 07:56

## 2019-04-05 RX ADMIN — CEFDINIR 300 MG: 300 CAPSULE ORAL at 07:56

## 2019-04-05 RX ADMIN — POTASSIUM CHLORIDE 20 MEQ: 1500 TABLET, EXTENDED RELEASE ORAL at 10:00

## 2019-04-05 ASSESSMENT — ACTIVITIES OF DAILY LIVING (ADL)
ADLS_ACUITY_SCORE: 16

## 2019-04-05 NOTE — PROGRESS NOTES
Remains sinus rhythm with occasional PVC and PAC, transfers well to [facility]. LS Fine crackles in BL bases. RA and tolerating SAT low to mid 90's. Dyspneic with exertion, recovers to baseline quickly. Denied all pain.        K+ 3.1- writer updated charge nurse

## 2019-04-05 NOTE — PROGRESS NOTES
S- Transfer to med surg from ICU.    B- Sepsis    A- Brief systems assessment: BP and HR controlled patient is SR and  on RA maintaining sats above 90%. UP to the commode with 1A with 1200cc's UOP this shift. Lungs clear with fine crackles in the RLL. Patient AOx4 and afebrile. Fair appetite with I/S to 750 and SCD's applied. Patient denies pain and or n/v this shift. Central line hep flushed and white cap changed this shift.     R- Transfer to 270 per physician orders. Continue to monitor pt and update physician as needed.      Code status: Full Code  Skin: intact  Fall Risk: Yes- Department fall risk interventions implemented.  Isolation: None  Core Measures: NA  Medication drips upon transfer: NS @ 10ml/hr

## 2019-04-05 NOTE — DISCHARGE SUMMARY
[facility]  Hospitalist Discharge Summary       Date of Admission:  3/30/2019  Date of Discharge:  4/5/2019  Discharging Provider: Magalys Payton MD      Discharge Diagnoses   Principal Problem:    Atrial fibrillation with RVR (H)  Active Problems:    Pneumonia of both lungs due to infectious organism    Acute kidney injury (H)    Acute encephalopathy    Thrombocytopenia (H)    Acute respiratory failure with hypoxia (H)    Demand ischemia of myocardium (H)    Shock (H) - probably septic and hypovolemic    Pyuria    Tobacco use    Acidosis    Diarrhea    Follow-ups Needed After Discharge   Follow-up Appointments     Follow Up and recommended labs and tests      Follow up with [facility] physician.  The following labs/tests are   recommended: hemoglobin and potassium on 4/8/19.  It is also recommended   that the patient have a colonoscopy and EGD performed to evaluate the   source of positive occult blood found during this hospitalization - at   time of discharge she is debating if she wants this to be performed at all   and if she does want it, in what setting (if she will try to have it done   while at the TCU or after she goes home).  Ongoing conversation with the   NH provider is recommended.  Will also set up an appointment to establish   care with a new PCP at the [facility] as her former provider retired.               Discharge Disposition   Discharged to [facility] service TCU  Condition at discharge: Stable    Hospital Course     Active Problems:    Shock (H) - probably septic and hypovolemic    Assessment: characterized by elevated lactic acid, acute kidney injury, acute encephalopathy, atrial fibrillation with RVR, thrombocytopenia, elevated troponins, acute respiratory failure with hypoxia and pneumonia found on imaging.  Patient has been on Rocephin and azithromycin and had progressive improvement sepsis resolving and ongoing improvement on  transition to PO Omnicef and azithromycin    Plan: Discharge to [facility] for ongoing rehab and strengthening, continue with Omnicef and azithromycin for course completion at time of discharge.        Pneumonia of both lungs due to infectious organism    Assessment: Thought to be the cause of sepsis as above.  Patient on Omnicef and azithromycin with ongoing clinical improvement.  Was weaned to RA yesterday and tolerating well    Plan: Discharge to TCU with plan as above      Atrial fibrillation with RVR (H); Paroxysmal atrial fibrillation; suspected SVT    Assessment: patient had been having intermittent episodes of atrial fibrillation with RVR with HR increasing into the 120-150 however was transitioned to metoprolol 12.5 mg BID with approval from cardiology to allow resting heart rate less than 60 as long as patient is asymptomatic.  Patient's HR has been in the 50-70s without atrial fibrillation since this transition and patient tolerating the medication without side effect.  Patient did have a 25 beat run of what appears to be SVT on the morning of discharge during which she was asymptomatic.      Plan:  Will continue with the metoprolol 12.5 mg BID, Zio patch will be placed prior to transfer to TCU for ongoing monitoring of HR and rhythms.  Patient will have cardiology appointment as an outpatient to review the results.  May need discussion of anticoagulation benefits and risk if paroxysmal atrial fibrillation is present.        Acute kidney injury (H)    Assessment: present initially and secondary to sepsis, now returned to previous baseline    Plan: routine outpatient monitoring      Acute encephalopathy    Assessment: present initially and secondary to sepsis, resolving with patient felt to be back to baseline mentation     Plan: ongoing monitoring and assessment as felt needed in the TCU environment.       Thrombocytopenia (H)    Assessment: initially mild in the 130 range and appears to be chronic in  nature, however worsened significantly secondary to sepsis with platelets down to 47 at lowest point, now starting to increase and 100 on day of discharge.      Plan: patient would benefit from recheck in 2-3 weeks to ensure complete resolution back to previous baseline       Acute respiratory failure with hypoxia (H)    Assessment: secondary to pneumonia and sepsis, now resolved    Plan: Will continue treatment of the pneumonia as above      Demand ischemia of myocardium (H)    Assessment: secondary to sepsis and atrial fibrillation with RVR.  Troponins are trending downward as HR control improves overall and sepsis resolves.  Echo performed during this hospitalization shows normal EF    Plan: No further routine monitoring as needed at this time      Diarrhea    Assessment:. Noted initially and patient still having occasional loose stool.  Occult stool is positive for blood but hemoglobins have been stable.  Enteric stool testing was negative.  Stool frequency has decreased, patient has been on PPI     Plan: Continue with PPI at time of discharge, prn imodium if symptoms get severe, encouraged EGD and colonoscopy as an outpatient which patient is resistant to.       Anemia    Assessment:  Hemoglobin was normal initially, decreased to 10.6 following fluid resuscitation but has stabilized in the low 10 range.  She does have positive occult stool as above.  Omeprazole has been started.     Plan:  Continue to monitor hemoglobin at the TCU to ensure ongoing stabilization.   Omeprazole will continue at the TCU.  Encouraged outpatient EGD and colonoscopy which patient is resistant to at time of discharge - will continue to think about it and is open to more discussion going forward as she gets stronger.         Pyuria    Assessment: urine culture growing out mixed mehnaz    Plan: no further treatment needed at time of discharge       Tobacco use    Assessment: noted, patient not currently on nicotine replacement but  patient is interested in cessation and a nicotine patch     Plan: will start nicotine patch and continue at TCU      Consultations This Hospital Stay   PHARMACY TO DOSE VANCO  PHARMACY TO DOSE VANCO  CARE TRANSITION RN/SW IP CONSULT  PHYSICAL THERAPY ADULT IP CONSULT  PHYSICAL THERAPY ADULT IP CONSULT  OCCUPATIONAL THERAPY ADULT IP CONSULT    Code Status   Full Code    Time Spent on this Encounter   I, Magalys Payton, personally saw the patient today and spent greater than 30 minutes discharging this patient.       Magalys Payton MD  [facility]  ______________________________________________________________________    Physical Exam   Vital Signs: Temp: 95.8  F (35.4  C) Temp src: Axillary BP: 148/79 Pulse: 68 Heart Rate: 63 Resp: 24 SpO2: (!) 89 % O2 Device: None (Room air)    Weight: 161 lbs 2.5 oz  Constitutional: awake, alert, cooperative, no apparent distress, and appears stated age  Respiratory: No increased work of breathing, good air exchange, clear to auscultation bilaterally, no crackles or wheezing  Cardiovascular: Normal apical impulse, sinus bradycardia currently in the upper 50s  GI: bowel sounds present, abdomen soft and non-tender  Skin: normal skin color, texture, turgor  Musculoskeletal: no lower extremity pitting edema present  Neurologic: Awake, alert, oriented to name, place and time.         Primary Care Physician   Physician No Ref-Primary    Immunizations       Discharge Orders      CARDIOLOGY EVAL ADULT REFERRAL      General info for SNF    Length of Stay Estimate: Short Term Care: Estimated # of Days <30  Condition at Discharge: Improving  Level of care:skilled   Rehabilitation Potential: Good  Admission H&P remains valid and up-to-date: Yes  Recent Chemotherapy: N/A  Use Nursing Home Standing Orders: Yes     Mantoux instructions    Give two-step Mantoux (PPD) Per Facility Policy Yes     Reason for your hospital stay    Sepsis caused by  a pneumonia, atrial fibrillation with RVR.  Your infection has improved with the antibiotics given during this hospitalization and your heart rate has improved as your infection improved and you have been placed on a heart rate medication to help prevent this from happening in the future.     Activity - Up with nursing assistance     Follow Up and recommended labs and tests    Follow up with [facility] physician.  The following labs/tests are recommended: hemoglobin and potassium on 4/8/19.  It is also recommended that the patient have a colonoscopy and EGD performed to evaluate the source of positive occult blood found during this hospitalization - at time of discharge she is debating if she wants this to be performed at all and if she does want it, in what setting (if she will try to have it done while at the TCU or after she goes home).  Ongoing conversation with the NH provider is recommended.  Will also set up an appointment to establish care with a new PCP at the [facility] as her former provider retired.     Full Code     Physical Therapy Adult Consult    Evaluate and treat as clinically indicated.    Reason:  Pneumonia and sepsis, atrial fibrillation with RVR, generalized weakness and deconditioning     Occupational Therapy Adult Consult    Evaluate and treat as clinically indicated.    Reason:  Pneumonia with sepsis, atrial fibrillation with RVR, generalized weakness and deconditioning     Oxygen - Nasal cannula    1-2 Lpm by nasal cannula to keep O2 sats 89% or greater.     Zio Patch Holter Adult Pediatric Greater than 48 hrs     Advance Diet as Tolerated    Follow this diet upon discharge: Regular     Discharge Medications   Current Discharge Medication List      START taking these medications    Details   albuterol (PROVENTIL) (2.5 MG/3ML) 0.083% neb solution Take 1 vial (2.5 mg) by nebulization every 4 hours as needed for wheezing    Associated Diagnoses: Pneumonia of both lungs due to infectious  organism, unspecified part of lung; Acute respiratory failure with hypoxia (H)      azithromycin (ZITHROMAX) 250 MG tablet Take 1 tablet (250 mg) by mouth daily for 1 day    Associated Diagnoses: Pneumonia of both lungs due to infectious organism, unspecified part of lung      benzocaine-menthol (CHLORASEPTIC) 6-10 MG lozenge Place 1 lozenge inside cheek every hour as needed for sore throat    Associated Diagnoses: Pneumonia of both lungs due to infectious organism, unspecified part of lung      cefdinir (OMNICEF) 300 MG capsule Take 1 capsule (300 mg) by mouth every 12 hours for 5 days    Associated Diagnoses: Pneumonia of both lungs due to infectious organism, unspecified part of lung      loperamide (IMODIUM A-D) 2 MG tablet Take 1 tablet (2 mg) by mouth 4 times daily as needed for diarrhea    Associated Diagnoses: Diarrhea, unspecified type      metoprolol tartrate (LOPRESSOR) 25 MG tablet Take 0.5 tablets (12.5 mg) by mouth 2 times daily    Associated Diagnoses: Atrial fibrillation with RVR (H)      omeprazole (PRILOSEC) 20 MG DR capsule Take 1 capsule (20 mg) by mouth every morning (before breakfast)    Associated Diagnoses: Gastrointestinal hemorrhage, unspecified gastrointestinal hemorrhage type; Gastroesophageal reflux disease with esophagitis      predniSONE (DELTASONE) 20 MG tablet Take 40 mg by mouth daily x3 days, then 20 mg daily x3 days, then 10 mg daily x3 days, then stop.    Comments: Please include the quantity of tablets and (strength) per dose in sig.  Associated Diagnoses: Pneumonia of both lungs due to infectious organism, unspecified part of lung; Acute respiratory failure with hypoxia (H)           Allergies   Allergies   Allergen Reactions     No Known Drug Allergies

## 2019-04-05 NOTE — PLAN OF CARE
S. End of shift report    B. Sepsis/hypovolemia    A. BP and HR controlled patient is SR and  on RA maintaining sats above 90%. UP to the commode with 1A with 1200cc's UOP this shift. Lungs clear with fine crackles in the RLL. Patient AOx4 and afebrile. Fair appetite with I/S to 750 and SCD's applied. Patient denies pain and or n/v this shift.     R. Will continue to monitor per POC.

## 2019-04-05 NOTE — PLAN OF CARE
S-(situation): Physical therapy treatment per POC    B-(background): Pt is 84 yo female admitted with dx of rapid A-fib and SIRS.    A-(assessment): Patient discharged prior to scheduled treatment.    R-(recommendations): Follow up with skilled physical therapy intravenation at TCU to improve activity tolerance, endurance, strength and safety. Discharge inpatient plan of care.    Physical Therapy Discharge Summary    Reason for therapy discharge:    Discharged to transitional care facility.    Progress towards therapy goal(s). See goals on Care Plan in [facility] electronic health record for goal details.  Goals partially met.  Barriers to achieving goals:   limited tolerance for therapy and discharge from facility.    Therapy recommendation(s):    Continued therapy is recommended.  Rationale/Recommendations:  see above.     Thank you for your referral.    Mirta Medina, PT, DPT, ATC    [facility]    O: [phone]  E: [email or web address]

## 2019-04-05 NOTE — PROGRESS NOTES
S-(situation): Patient discharged to [facility] TCU via w/c with  Mihir No.    B-(background): Pneumonia with SIRS/a. fib    A-(assessment): 89-90% on RA. SBA with transfers. A+O x4. Denies pain. VSS.    R-(recommendations): Discharge instructions sent with pt to TCU. Listed belongings gathered and returned to patient.          Discharge Nursing Criteria:     Care Plan and Patient education resolved: Yes    New Medications- pt has been educated about purpose and side effects: Not Applicable    Vaccines  Influenza status verified at discharge:  Yes    MISC  Prescriptions if needed, hard copies sent with patient  Yes  Home and hospital aquired medications returned to patient: NA  Medication Bin checked and emptied on discharge Yes  Patient reports post-discharge pain management plan is effective: Yes

## 2019-04-05 NOTE — PROGRESS NOTES
[place] [facility]  PRIMARY CARE PROVIDER AND CLINIC:  [facility], [address] [facility] [address]  Chief Complaint   Patient presents with     Hospital F/U     Clinic Care Coordination - Initial     [facility] Medical Record Number:  [id number]  Place of Service where encounter took place:  [facility] [[id number]]    Gustavo Palmer  is a 85 year old  (6/15/1933), admitted to the above facility from  [facility]. Hospital stay 3/30/19 through 4/5/19..  Admitted to this facility for  rehab, medical management and nursing care.    HPI:    HPI information obtained from: facility chart records, facility staff, patient report and [facility] chart review.   Brief Summary of Hospital Course: Pt has septic shock 2/2 bacterial pneumonia with subsequent A fib RVR, demand ischemia of myocardium and  acute respiratory failure with hypoxia.  Pneumonia tx with abx and prednisone - now on azithromycin and Omnicef and off O2 at hosp discharge.   A fib controlled with lopressor with note OK to have HR in 50's if continues to be asymptomatic - Zio patch placed. And f/u with cards.   Noted to have + guaiac stools and needs f/u with EGD and colonoscopy.   Updates on Status Since Skilled nursing Admission: Pt doing quite well - has O2 orders for continuous, but pt not needing/wanting.   Noted SNF [facility] unable to get Nicotine patches and subbed with gum. Pt reports not having any nicotine w/drawl or cravings.     CODE STATUS/ADVANCE DIRECTIVES DISCUSSION:   CPR/Full code order, but pt is clear she wants DNR/DNI and this is congruent with living will on file.   Patient's living condition: lives with family, dgt and grandson   ALLERGIES: No known drug allergies  PAST MEDICAL HISTORY:  has a past medical history of Unspecified hemorrhoids without mention of complication and Unspecified nonsenile cataract.  PAST SURGICAL HISTORY:   has a past surgical history that  "includes REMV CATARACT EXTRACAP,INSERT LENS (2/17/2004); REMV CATARACT EXTRACAP,INSERT LENS (3/16/2004); and TOTAL HIP ARTHROPLASTY (01/26/10).  FAMILY HISTORY: family history includes Cancer in her mother; Diabetes in her brother.  SOCIAL HISTORY:   reports that she has been smoking.  She has a 40.00 pack-year smoking history. She has never used smokeless tobacco. She reports that she drinks alcohol. She reports that she does not use drugs.    Post Discharge Medication Reconciliation Status: discharge medications reconciled, continue medications without change    Current Outpatient Medications   Medication Sig Dispense Refill     albuterol (PROVENTIL) (2.5 MG/3ML) 0.083% neb solution Take 1 vial (2.5 mg) by nebulization every 4 hours as needed for wheezing       benzocaine-menthol (CHLORASEPTIC) 6-10 MG lozenge Place 1 lozenge inside cheek every hour as needed for sore throat       cefdinir (OMNICEF) 300 MG capsule Take 1 capsule (300 mg) by mouth every 12 hours for 5 days       loperamide (IMODIUM A-D) 2 MG tablet Take 1 tablet (2 mg) by mouth 4 times daily as needed for diarrhea       metoprolol tartrate (LOPRESSOR) 25 MG tablet Take 0.5 tablets (12.5 mg) by mouth 2 times daily       nicotine (NICORETTE) 4 MG lozenge Place 4 mg inside cheek every hour as needed for smoking cessation       omeprazole (PRILOSEC) 20 MG DR capsule Take 1 capsule (20 mg) by mouth every morning (before breakfast)       predniSONE (DELTASONE) 20 MG tablet Take 40 mg by mouth daily x3 days, then 20 mg daily x3 days, then 10 mg daily x3 days, then stop.         ROS:  10 point ROS of systems including Constitutional, Eyes, Respiratory, Cardiovascular, Gastroenterology, Genitourinary, Integumentary, Musculoskeletal, Psychiatric were all negative except for pertinent positives noted in my HPI.    Vitals:  /69   Pulse 60   Temp 97.5  F (36.4  C)   Resp 16   Ht 1.676 m (5' 6\")   Wt 69.4 kg (152 lb 14.4 oz)   SpO2 91%   BMI 24.68 " kg/m    Exam:  GENERAL APPEARANCE:  Alert, in no distress  RESP:  respiratory effort and palpation of chest normal, auscultation of lungs ronchi t/o all felids that doesn't improve after cough , no respiratory distress  CV:  Palpation and auscultation of heart done , rate and rhythm reg, no murmur, no peripheral edema  ABDOMEN:  normal bowel sounds, soft, nontender, no hepatosplenomegaly or other masses  M/S:   Gait and station SBA - 2 WW, Digits and nails normal  SKIN:  Inspection and Palpation of skin and subcutaneous tissue intact  NEURO: 2-12 in normal limits and at patient's baseline  PSYCH:  insight and judgement, memory good , affect and mood normal    Lab/Diagnostic data:  Recent labs in [facility] reviewed by me today.    Estimated Creatinine Clearance: 72.7 mL/min (based on SCr of 0.62 mg/dL).      ASSESSMENT/PLAN:  Current [place] scheduled appointments:  Next 5 appointments (look out 90 days)    Apr 12, 2019 10:40 AM CDT  [facility] [address] [facility] [address] [phone]         Pneumonia of both lower lobes due to infectious organism (H)  Appears to be improving - could benefit from scheduled nebs.   Likely COPD given smoking hx - may benefit from LAMA.LABA   Cont ABX and pred jean  F/u next week.     Atrial fibrillation with RVR (H)  Appears to still be in NSR - Zio patch on.   Noted alb nebs may cause tachy - will monitor.   Keep BB on for now.     Thrombocytopenia (H)  Noted in hosp - will recheck tomorrow.     Iron deficiency anemia due to chronic blood loss  Recheck today moved to tomorrow given scheduled lab day of facility    Hypokalemia  Recheck today moved to tomorrow given scheduled lab day of facility    Diarrhea, unspecified type  Appears to be stable now - may be ABX associated - will add pro biotic    Tobacco use  Stable/controlled off nicotine     ACP (advance care planning)  discussed  with pt - she is clear DNR/DNI - reviewed current living will on [facility] records with her.   POLST signed and sent to scanner. Code changed in Epic and order written for facility.   - DNR/DNI     Orders written by provider at facility and transcribed by : Yasmine Sterling MA    1.  Wean 02 to keep sats >90%.  Discontinue when RA x 2 days w/sats >90%.  Dx: pneumonia  2.  Albuterol 2.5 mg/3 mL inhalation TID x 7 days scheduled - keep PRN.  Dx: pneumonia  3.  I.S. TID 10 times (please order on if pt doesn't have it from the hospital).  Dx: pneumonia  4.  Start Probiotic 1 cup po BID x 7 days.  Dx: abx associated diarrhea   5.  Change PRN imodium to BID PRN.  6.  Change code to DNR/DNI.  7.  Discontinue right I.J. Dressing today.  8. Recheck HGB and K tomorrow from today per nsg request given lab day.   9. Add PLTS.     Consider Fe start. If HGB low.     Total time spent with patient visit at the skilled nursing facility was 40 including patient visit and review of past records. Greater than 50% of total time spent with counseling and coordinating care due to discussion about code change and POLST completion  Electronically signed by:  KRISTEN Ortega CNP

## 2019-04-05 NOTE — PROGRESS NOTES
Gustavo Palmer  Gender: female  : 6/15/1933  [address]  [facility] [address]  [phone] (home)     Medical Record: [id number]  Pharmacy: [facility] [place] - [address] [place] [address]  Primary Care Provider: No Ref-Primary, Physician    Parent's names are: Data Unavailable (mother) and Data Unavailable (father).      [facility]  2019     Discharge Phone Call:  Discharge to [facility]

## 2019-04-08 ENCOUNTER — NURSING HOME VISIT (OUTPATIENT)
Dept: GERIATRICS | Facility: CLINIC | Age: 84
End: 2019-04-08
Payer: COMMERCIAL

## 2019-04-08 VITALS
TEMPERATURE: 97.5 F | OXYGEN SATURATION: 91 % | BODY MASS INDEX: 24.57 KG/M2 | HEIGHT: 66 IN | DIASTOLIC BLOOD PRESSURE: 69 MMHG | RESPIRATION RATE: 16 BRPM | WEIGHT: 152.9 LBS | SYSTOLIC BLOOD PRESSURE: 132 MMHG | HEART RATE: 60 BPM

## 2019-04-08 DIAGNOSIS — D50.0 IRON DEFICIENCY ANEMIA DUE TO CHRONIC BLOOD LOSS: ICD-10-CM

## 2019-04-08 DIAGNOSIS — D69.6 THROMBOCYTOPENIA (H): ICD-10-CM

## 2019-04-08 DIAGNOSIS — J18.9 PNEUMONIA OF BOTH LOWER LOBES DUE TO INFECTIOUS ORGANISM: Primary | ICD-10-CM

## 2019-04-08 DIAGNOSIS — Z71.89 ACP (ADVANCE CARE PLANNING): ICD-10-CM

## 2019-04-08 DIAGNOSIS — R19.7 DIARRHEA, UNSPECIFIED TYPE: ICD-10-CM

## 2019-04-08 DIAGNOSIS — Z72.0 TOBACCO USE: ICD-10-CM

## 2019-04-08 DIAGNOSIS — E87.6 HYPOKALEMIA: ICD-10-CM

## 2019-04-08 DIAGNOSIS — I48.91 ATRIAL FIBRILLATION WITH RVR (H): ICD-10-CM

## 2019-04-08 PROCEDURE — 99310 SBSQ NF CARE HIGH MDM 45: CPT | Performed by: NURSE PRACTITIONER

## 2019-04-08 ASSESSMENT — MIFFLIN-ST. JEOR: SCORE: 1155.3

## 2019-04-08 NOTE — LETTER
4/8/2019        RE: Gustavo Palmer  [address]  [place] [address]        [place] [facility]  PRIMARY CARE PROVIDER AND CLINIC:  [facility], [address] [facility] [address]  Chief Complaint   Patient presents with     Hospital F/U     Clinic Care Coordination - Initial     [place] Medical Record Number:  [id number]  Place of Service where encounter took place:  [facility] ([facility] [[id number]]    Gustavo Palmer  is a 85 year old  (6/15/1933), admitted to the above facility from  [facility]. Hospital stay 3/30/19 through 4/5/19..  Admitted to this facility for  rehab, medical management and nursing care.    HPI:    HPI information obtained from: facility chart records, facility staff, patient report and [facility] chart review.   Brief Summary of Hospital Course: Pt has septic shock 2/2 bacterial pneumonia with subsequent A fib RVR, demand ischemia of myocardium and  acute respiratory failure with hypoxia.  Pneumonia tx with abx and prednisone - now on azithromycin and Omnicef and off O2 at hosp discharge.   A fib controlled with lopressor with note OK to have HR in 50's if continues to be asymptomatic - Zio patch placed. And f/u with cards.   Noted to have + guaiac stools and needs f/u with EGD and colonoscopy.   Updates on Status Since Skilled nursing Admission: Pt doing quite well - has O2 orders for continuous, but pt not needing/wanting.   Noted SNF [facility] unable to get Nicotine patches and subbed with gum. Pt reports not having any nicotine w/drawl or cravings.     CODE STATUS/ADVANCE DIRECTIVES DISCUSSION:   CPR/Full code order, but pt is clear she wants DNR/DNI and this is congruent with living will on file.   Patient's living condition: lives with family, dgt and grandson   ALLERGIES: No known drug allergies  PAST MEDICAL HISTORY:  has a past medical history of Unspecified hemorrhoids without mention of complication and Unspecified  nonsenile cataract.  PAST SURGICAL HISTORY:   has a past surgical history that includes REMV CATARACT EXTRACAP,INSERT LENS (2/17/2004); REMV CATARACT EXTRACAP,INSERT LENS (3/16/2004); and TOTAL HIP ARTHROPLASTY (01/26/10).  FAMILY HISTORY: family history includes Cancer in her mother; Diabetes in her brother.  SOCIAL HISTORY:   reports that she has been smoking.  She has a 40.00 pack-year smoking history. She has never used smokeless tobacco. She reports that she drinks alcohol. She reports that she does not use drugs.    Post Discharge Medication Reconciliation Status: discharge medications reconciled, continue medications without change    Current Outpatient Medications   Medication Sig Dispense Refill     albuterol (PROVENTIL) (2.5 MG/3ML) 0.083% neb solution Take 1 vial (2.5 mg) by nebulization every 4 hours as needed for wheezing       benzocaine-menthol (CHLORASEPTIC) 6-10 MG lozenge Place 1 lozenge inside cheek every hour as needed for sore throat       cefdinir (OMNICEF) 300 MG capsule Take 1 capsule (300 mg) by mouth every 12 hours for 5 days       loperamide (IMODIUM A-D) 2 MG tablet Take 1 tablet (2 mg) by mouth 4 times daily as needed for diarrhea       metoprolol tartrate (LOPRESSOR) 25 MG tablet Take 0.5 tablets (12.5 mg) by mouth 2 times daily       nicotine (NICORETTE) 4 MG lozenge Place 4 mg inside cheek every hour as needed for smoking cessation       omeprazole (PRILOSEC) 20 MG DR capsule Take 1 capsule (20 mg) by mouth every morning (before breakfast)       predniSONE (DELTASONE) 20 MG tablet Take 40 mg by mouth daily x3 days, then 20 mg daily x3 days, then 10 mg daily x3 days, then stop.         ROS:  10 point ROS of systems including Constitutional, Eyes, Respiratory, Cardiovascular, Gastroenterology, Genitourinary, Integumentary, Musculoskeletal, Psychiatric were all negative except for pertinent positives noted in my HPI.    Vitals:  /69   Pulse 60   Temp 97.5  F (36.4  C)   Resp 16   " Ht 1.676 m (5' 6\")   Wt 69.4 kg (152 lb 14.4 oz)   SpO2 91%   BMI 24.68 kg/m     Exam:  GENERAL APPEARANCE:  Alert, in no distress  RESP:  respiratory effort and palpation of chest normal, auscultation of lungs ronchi t/o all felids that doesn't improve after cough , no respiratory distress  CV:  Palpation and auscultation of heart done , rate and rhythm reg, no murmur, no peripheral edema  ABDOMEN:  normal bowel sounds, soft, nontender, no hepatosplenomegaly or other masses  M/S:   Gait and station SBA - 2 WW, Digits and nails normal  SKIN:  Inspection and Palpation of skin and subcutaneous tissue intact  NEURO: 2-12 in normal limits and at patient's baseline  PSYCH:  insight and judgement, memory good , affect and mood normal    Lab/Diagnostic data:  Recent labs in [facility] reviewed by me today.    Estimated Creatinine Clearance: 72.7 mL/min (based on SCr of 0.62 mg/dL).      ASSESSMENT/PLAN:  Current [place] scheduled appointments:  Next 5 appointments (look out 90 days)    Apr 12, 2019 10:40 AM CDT  Office Visit with Madi Lemos DO  [facility] ([facility] [address] [facility] [address] [phone]         Pneumonia of both lower lobes due to infectious organism (H)  Appears to be improving - could benefit from scheduled nebs.   Likely COPD given smoking hx - may benefit from LAMA.LABA   Cont ABX and pred jean  F/u next week.     Atrial fibrillation with RVR (H)  Appears to still be in NSR - Zio patch on.   Noted alb nebs may cause tachy - will monitor.   Keep BB on for now.     Thrombocytopenia (H)  Noted in hosp - will recheck tomorrow.     Iron deficiency anemia due to chronic blood loss  Recheck today moved to tomorrow given scheduled lab day of facility    Hypokalemia  Recheck today moved to tomorrow given scheduled lab day of facility    Diarrhea, unspecified type  Appears to be stable now - may be ABX associated - will add pro biotic    Tobacco " use  Stable/controlled off nicotine     ACP (advance care planning)  discussed with pt - she is clear DNR/DNI - reviewed current living will on [facility] records with her.   POLST signed and sent to scanner. Code changed in Epic and order written for facility.   - DNR/DNI     Orders written by provider at facility and transcribed by : Yasmine Sterling MA    1.  Wean 02 to keep sats >90%.  Discontinue when RA x 2 days w/sats >90%.  Dx: pneumonia  2.  Albuterol 2.5 mg/3 mL inhalation TID x 7 days scheduled - keep PRN.  Dx: pneumonia  3.  I.S. TID 10 times (please order on if pt doesn't have it from the hospital).  Dx: pneumonia  4.  Start Probiotic 1 cup po BID x 7 days.  Dx: abx associated diarrhea   5.  Change PRN imodium to BID PRN.  6.  Change code to DNR/DNI.  7.  Discontinue right I.J. Dressing today.  8. Recheck HGB and K tomorrow from today per nsg request given lab day.   9. Add PLTS.     Consider Fe start. If HGB low.     Total time spent with patient visit at the skilled nursing facility was 40 including patient visit and review of past records. Greater than 50% of total time spent with counseling and coordinating care due to discussion about code change and POLST completion  Electronically signed by:  KRISTEN Ortega CNP                         Sincerely,        KRISTEN Ortega CNP

## 2019-04-09 ENCOUNTER — DOCUMENTATION ONLY (OUTPATIENT)
Dept: OTHER | Facility: CLINIC | Age: 84
End: 2019-04-09

## 2019-04-09 ENCOUNTER — HOSPITAL LABORATORY (OUTPATIENT)
Dept: NURSING HOME | Facility: OTHER | Age: 84
End: 2019-04-09

## 2019-04-09 LAB
HGB BLD-MCNC: 12.3 G/DL (ref 11.7–15.7)
PLATELET # BLD AUTO: 152 10E9/L (ref 150–450)
POTASSIUM SERPL-SCNC: 3.7 MMOL/L (ref 3.4–5.3)

## 2019-04-25 ENCOUNTER — OFFICE VISIT (OUTPATIENT)
Dept: FAMILY MEDICINE | Facility: OTHER | Age: 84
End: 2019-04-25
Payer: COMMERCIAL

## 2019-04-25 VITALS
SYSTOLIC BLOOD PRESSURE: 126 MMHG | WEIGHT: 158.4 LBS | RESPIRATION RATE: 24 BRPM | TEMPERATURE: 98.4 F | HEART RATE: 84 BPM | OXYGEN SATURATION: 97 % | BODY MASS INDEX: 25.57 KG/M2 | DIASTOLIC BLOOD PRESSURE: 82 MMHG

## 2019-04-25 DIAGNOSIS — J18.9 PNEUMONIA OF BOTH LUNGS DUE TO INFECTIOUS ORGANISM, UNSPECIFIED PART OF LUNG: Primary | ICD-10-CM

## 2019-04-25 DIAGNOSIS — I48.91 ATRIAL FIBRILLATION WITH RVR (H): ICD-10-CM

## 2019-04-25 PROCEDURE — 99214 OFFICE O/P EST MOD 30 MIN: CPT | Performed by: NURSE PRACTITIONER

## 2019-04-25 RX ORDER — METOPROLOL TARTRATE 25 MG/1
12.5 TABLET, FILM COATED ORAL 2 TIMES DAILY
Qty: 60 TABLET | Refills: 11 | Status: SHIPPED | OUTPATIENT
Start: 2019-04-25 | End: 2019-05-07

## 2019-04-25 ASSESSMENT — PAIN SCALES - GENERAL: PAINLEVEL: NO PAIN (0)

## 2019-04-25 NOTE — PATIENT INSTRUCTIONS
See the cardiologist as planned in [place].     Follow up with me in 6 months.     Your are due for a shingles vaccine.

## 2019-04-25 NOTE — PROGRESS NOTES
SUBJECTIVE:   Gustavo Palmer is a 85 year old female who presents to clinic today for the following   health issues:          Hospital Follow-up Visit:    Hospital/Nursing Home/IP Rehab Facility: [facility]  Date of Admission: 04/05/2019  Date of Discharge: 04/19/2019  Reason(s) for Admission: pneumonia             Problems taking medications regularly:  None       Medication changes since discharge: None       Problems adhering to non-medication therapy:  None    Summary of hospitalization:  [facility] discharge summary reviewed  Diagnostic Tests/Treatments reviewed.  Follow up needed: with Cardiology   Other Healthcare Providers Involved in Patient s Care:         None  Update since discharge: improved.     Post Discharge Medication Reconciliation: discharge medications reconciled, continue medications without change.  Plan of care communicated with patient     Coding guidelines for this visit:  Type of Medical   Decision Making Face-to-Face Visit       within 7 Days of discharge Face-to-Face Visit        within 14 days of discharge   Moderate Complexity [id number]   High Complexity [id number]          She was hospitalized from 3/30/19-4/5/19 with bilateral pneumonia, sepsis, new onset atrial fibrillation with RVR.  She was subsequently transferred to the nursing home for rehab for 2 weeks.  Was discharged home 1 week ago.  Lives with her daughter and grandson.  States she is doing well at home.  No further respiratory symptoms.  Is walking with a cane and feels stable with this.  Lab values have return to normal.   She is only taking Metoprolol 12.5 mg BID to control her heart rate.  She has returned to a sinus rhythm and is seeing cardiology in follow up next week.  Prior to this hospitalization she was quite healthy and not on any daily medications.  She had not been seen in the clinic in several years.      She has no concerns today.  States she feels well and has successfully stopped smoking.          Additional history: as documented    Reviewed  and updated as needed this visit by clinical staff  Tobacco  Allergies  Meds         Reviewed and updated as needed this visit by Provider         Patient Active Problem List   Diagnosis     Primary localized osteoarthrosis, pelvic region and thigh     CARDIOVASCULAR SCREENING; LDL GOAL LESS THAN 130     Impaired fasting glucose     Health Care Home     Pneumonia of both lungs due to infectious organism     Pyuria     Tobacco use     Acute kidney injury (H)     Acute encephalopathy     Acidosis     Thrombocytopenia (H)     Acute respiratory failure with hypoxia (H)     Demand ischemia of myocardium (H)     Shock (H) - probably septic and hypovolemic     Diarrhea     Atrial fibrillation with RVR (H)     Past Surgical History:   Procedure Laterality Date     C TOTAL HIP ARTHROPLASTY  01/26/10    Right     HC REMV CATARACT EXTRACAP,INSERT LENS  2/17/2004    Right     HC REMV CATARACT EXTRACAP,INSERT LENS  3/16/2004    Left       Social History     Tobacco Use     Smoking status: Former Smoker     Packs/day: 1.00     Years: 40.00     Pack years: 40.00     Smokeless tobacco: Never Used   Substance Use Topics     Alcohol use: Yes     Comment: occ wine     Family History   Problem Relation Age of Onset     Diabetes Brother         x2 brothers     Cancer Mother         lung cancer         Current Outpatient Medications   Medication Sig Dispense Refill     albuterol (PROVENTIL) (2.5 MG/3ML) 0.083% neb solution Take 1 vial (2.5 mg) by nebulization every 4 hours as needed for wheezing       benzocaine-menthol (CHLORASEPTIC) 6-10 MG lozenge Place 1 lozenge inside cheek every hour as needed for sore throat       metoprolol tartrate (LOPRESSOR) 25 MG tablet Take 0.5 tablets (12.5 mg) by mouth 2 times daily 60 tablet 11     nicotine (NICORETTE) 4 MG lozenge Place 4 mg inside cheek every hour as needed for smoking cessation       loperamide (IMODIUM A-D) 2 MG tablet Take 1  tablet (2 mg) by mouth 4 times daily as needed for diarrhea       Allergies   Allergen Reactions     No Known Drug Allergies      BP Readings from Last 3 Encounters:   04/25/19 126/82   04/08/19 132/69   04/05/19 148/79    Wt Readings from Last 3 Encounters:   04/25/19 71.8 kg (158 lb 6.4 oz)   04/08/19 69.4 kg (152 lb 14.4 oz)   04/04/19 73.1 kg (161 lb 2.5 oz)                    ROS:  Constitutional, HEENT, cardiovascular, pulmonary, GI, , musculoskeletal, neuro, skin, endocrine and psych systems are negative, except as otherwise noted.    OBJECTIVE:     /82   Pulse 84   Temp 98.4  F (36.9  C) (Temporal)   Resp 24   Wt 71.8 kg (158 lb 6.4 oz)   SpO2 97%   BMI 25.57 kg/m    Body mass index is 25.57 kg/m .  GENERAL: alert, no distress, frail and elderly  NECK: no adenopathy, no asymmetry, masses, or scars and thyroid normal to palpation  RESP: lungs clear to auscultation - no rales, rhonchi or wheezes, diminished bilaterally   CV: regular rate and rhythm, normal S1 S2, no S3 or S4, no murmur, click or rub, ABDOMEN: soft, nontender hepatosplenomegaly, no masses and bowel sounds normal  MS: no gross musculoskeletal defects noted, no edema    Diagnostic Test Results:  none     ASSESSMENT/PLAN:       1. Pneumonia of both lungs due to infectious organism, unspecified part of lung  Resolved.      2. Atrial fibrillation with RVR (H)  Intermittent.  Seems to have resolved now.  Will be following up with cardiology next week. Continue on Metoprolol as prescribed.   - metoprolol tartrate (LOPRESSOR) 25 MG tablet; Take 0.5 tablets (12.5 mg) by mouth 2 times daily  Dispense: 60 tablet; Refill: 11    See Patient Instructions    KRISTEN Winston [facility]

## 2019-05-07 ENCOUNTER — OFFICE VISIT (OUTPATIENT)
Dept: CARDIOLOGY | Facility: CLINIC | Age: 84
End: 2019-05-07
Attending: FAMILY MEDICINE
Payer: COMMERCIAL

## 2019-05-07 VITALS
HEART RATE: 70 BPM | DIASTOLIC BLOOD PRESSURE: 80 MMHG | WEIGHT: 163.1 LBS | BODY MASS INDEX: 28.9 KG/M2 | SYSTOLIC BLOOD PRESSURE: 138 MMHG | HEIGHT: 63 IN | OXYGEN SATURATION: 97 %

## 2019-05-07 DIAGNOSIS — I48.0 PAROXYSMAL ATRIAL FIBRILLATION (H): Primary | ICD-10-CM

## 2019-05-07 PROCEDURE — 99214 OFFICE O/P EST MOD 30 MIN: CPT | Performed by: INTERNAL MEDICINE

## 2019-05-07 RX ORDER — METOPROLOL TARTRATE 25 MG/1
25 TABLET, FILM COATED ORAL 2 TIMES DAILY
Qty: 180 TABLET | Refills: 3 | Status: SHIPPED | OUTPATIENT
Start: 2019-05-07 | End: 2019-09-04

## 2019-05-07 ASSESSMENT — MIFFLIN-ST. JEOR: SCORE: 1153.95

## 2019-05-07 NOTE — LETTER
5/7/2019      Roseann Murillo, APRN CNP  [address] [facility] [address]      RE: Gustavo Palmer       Dear Colleague,    I had the pleasure of seeing Gustavo Palmer in the [facility].    Service Date: 05/07/2019      HISTORY OF PRESENT ILLNESS:  I had the opportunity to see Ms. Gustavo Palmer in Cardiology Clinic today at the [facility] in [place] for consultation regarding recent onset of atrial fibrillation with rapid ventricular response.  She is an 85-year-old woman who was seen in the emergency room at [facility] in [place] on 03/30/2019 with a primary complaint of generalized weakness and fatigue and possibly some shortness of breath.  She denies feeling the shortness of breath at this point, but admits that she had been feeling severely weak for about a week's duration and eventually had difficulty standing up because her legs were so weak.  The notes also indicate a progressive productive cough and some chills, but no apparent fever or sweats.  She did not have hemoptysis.      She was found to have atrial fibrillation with rapid ventricular response and chest x-ray evidence of pneumonia.  She is a long time smoker for approximately 70 years of about a pack per day.  Her lactic acid level was elevated consistent with sepsis and she was admitted to the hospital Intensive Care Unit for management.  Her heart rate was initially 170 beats per minute with diffuse primarily inferolateral minor ST segment depression and T-wave inversion.  She had a minor troponin elevation, but no chest pain.  She denied feeling palpitations, lightheadedness and did not have syncope.  She developed anemia and thrombocytopenia during her hospitalization, probably as a complication of her sepsis.  She was treated with rate control for her atrial fibrillation and antibiotics and eventually recovered and was discharged after about a week of treatment.  She  converted spontaneously back to sinus rhythm during her admission.  She spent 2 weeks in a transitional care unit before returning home again.  Since then she has felt well.  She is not experiencing any symptoms of palpitations, shortness of breath, lightheadedness or weakness like she did before.  Her hemoglobin and platelet count have normalized and transient kidney dysfunction has also resolved.      She had a series of ECGs which I reviewed personally.  I confirmed that she had atrial fibrillation with rapid ventricular response and also confirmed that she converted back to sinus rhythm and had no residual concerning ECG changes on her followup ECG.  She underwent a ZIO Patch monitor for a week while she was in the rehab unit.  I reviewed the ZIO Patch results.  She had no evidence of recurrent atrial fibrillation, but did have PACs, PVCs and a few brief episodes of SVT which apparently were asymptomatic.        PHYSICAL EXAMINATION:  On examination currently, her blood pressure was 138/80, heart rate 70 and weight 163 pounds.  She had been on metoprolol tartrate 25 b.i.d., but this was reduced to 12.5 b.i.d. due to bradycardia.  She had heart rates down into the 50s.  Her lungs sound clear with diminished breath sounds.  Heart rhythm is regular.  I do not hear any cardiac murmurs.  She has no carotid bruits, no edema.      IMPRESSIONS:  Ms. Gustavo Palmer is an 85-year-old woman with 1 episode of recognized atrial fibrillation when she presented with bilateral pneumonia and sepsis syndrome.  I suspect that the pneumonia and sepsis were the triggering factors.  She does not seem to have any history of hypertension, but she had been a longtime smoker until her hospitalization and now has been a nonsmoker since then for the last month.  I congratulated her on quitting smoking.  I encouraged her not to start again.  However, she is certainly at risk for recurrent atrial fibrillation and I recommended that she  initiate anticoagulation due to her increased risk of stroke.  Her CHADS-VASc score is 3, even though we do not know her coronary disease/vascular status.      She is somewhat hesitant to start blood thinners.  We talked about that at length.  I did indicate that there is a risk of bleeding, mostly minor bleeding, but the risk of major bleeding is about 0.8% per year.  However, her risk of stroke is probably in the range of 4-6% per year, which would certainly favor taking a blood thinner to prevent stroke.  She seemed to understand this discussion and will look into the cost of newer blood thinners such as Eliquis, Xarelto or Pradaxa.  I did send a prescription in for Eliquis for her.  If that medication turns out to be too expensive, I will have her take warfarin and start with the anticoagulation clinic in [place].      I will also increase her metoprolol back to 25 mg p.o. b.i.d. to obtain better rate control if she does happen to go back into atrial fibrillation.  I will have her follow up with us in Cardiology Clinic in 6 months to see how she is doing with this.      cc:   KRISTEN Keating, CNP   [facility]    [address] [place] [address]         MCKAY HIGGINS MD, [facility]             D: 2019   T: 2019   MT: NASIM      Name:     RAYO NIELSEN   MRN:      [id number]        Account:      [id number]   :      06/15/1933           Service Date: 2019      Document: [id number]         Outpatient Encounter Medications as of 2019   Medication Sig Dispense Refill     albuterol (PROVENTIL) (2.5 MG/3ML) 0.083% neb solution Take 1 vial (2.5 mg) by nebulization every 4 hours as needed for wheezing       apixaban ANTICOAGULANT (ELIQUIS) 5 MG tablet Take 1 tablet (5 mg) by mouth 2 times daily 60 tablet 11     benzocaine-menthol (CHLORASEPTIC) 6-10 MG lozenge Place 1 lozenge inside cheek every hour as needed for sore throat       loperamide (IMODIUM A-D) 2 MG tablet Take 1 tablet  (2 mg) by mouth 4 times daily as needed for diarrhea       metoprolol tartrate (LOPRESSOR) 25 MG tablet Take 1 tablet (25 mg) by mouth 2 times daily 180 tablet 3     nicotine (NICORETTE) 4 MG lozenge Place 4 mg inside cheek every hour as needed for smoking cessation       [] cefdinir (OMNICEF) 300 MG capsule Take 1 capsule (300 mg) by mouth every 12 hours for 5 days       [DISCONTINUED] metoprolol tartrate (LOPRESSOR) 25 MG tablet Take 0.5 tablets (12.5 mg) by mouth 2 times daily 60 tablet 11     No facility-administered encounter medications on file as of 2019.        Again, thank you for allowing me to participate in the care of your patient.      Sincerely,    MCKAY HIGGINS MD     [facility]    cc:   Magalys Payton MD  [address] [place] [id number]

## 2019-05-07 NOTE — PROGRESS NOTES
Service Date: 05/07/2019      HISTORY OF PRESENT ILLNESS:  I had the opportunity to see Ms. Gustavo Palmer in Cardiology Clinic today at the [facility] in [place] for consultation regarding recent onset of atrial fibrillation with rapid ventricular response.  She is an 85-year-old woman who was seen in the emergency room at [facility] in [place] on 03/30/2019 with a primary complaint of generalized weakness and fatigue and possibly some shortness of breath.  She denies feeling the shortness of breath at this point, but admits that she had been feeling severely weak for about a week's duration and eventually had difficulty standing up because her legs were so weak.  The notes also indicate a progressive productive cough and some chills, but no apparent fever or sweats.  She did not have hemoptysis.      She was found to have atrial fibrillation with rapid ventricular response and chest x-ray evidence of pneumonia.  She is a long time smoker for approximately 70 years of about a pack per day.  Her lactic acid level was elevated consistent with sepsis and she was admitted to the hospital Intensive Care Unit for management.  Her heart rate was initially 170 beats per minute with diffuse primarily inferolateral minor ST segment depression and T-wave inversion.  She had a minor troponin elevation, but no chest pain.  She denied feeling palpitations, lightheadedness and did not have syncope.  She developed anemia and thrombocytopenia during her hospitalization, probably as a complication of her sepsis.  She was treated with rate control for her atrial fibrillation and antibiotics and eventually recovered and was discharged after about a week of treatment.  She converted spontaneously back to sinus rhythm during her admission.  She spent 2 weeks in a transitional care unit before returning home again.  Since then she has felt well.  She is not experiencing any symptoms of  palpitations, shortness of breath, lightheadedness or weakness like she did before.  Her hemoglobin and platelet count have normalized and transient kidney dysfunction has also resolved.      She had a series of ECGs which I reviewed personally.  I confirmed that she had atrial fibrillation with rapid ventricular response and also confirmed that she converted back to sinus rhythm and had no residual concerning ECG changes on her followup ECG.  She underwent a ZIO Patch monitor for a week while she was in the rehab unit.  I reviewed the ZIO Patch results.  She had no evidence of recurrent atrial fibrillation, but did have PACs, PVCs and a few brief episodes of SVT which apparently were asymptomatic.        PHYSICAL EXAMINATION:  On examination currently, her blood pressure was 138/80, heart rate 70 and weight 163 pounds.  She had been on metoprolol tartrate 25 b.i.d., but this was reduced to 12.5 b.i.d. due to bradycardia.  She had heart rates down into the 50s.  Her lungs sound clear with diminished breath sounds.  Heart rhythm is regular.  I do not hear any cardiac murmurs.  She has no carotid bruits, no edema.      IMPRESSIONS:  Ms. Gustavo Palmer is an 85-year-old woman with 1 episode of recognized atrial fibrillation when she presented with bilateral pneumonia and sepsis syndrome.  I suspect that the pneumonia and sepsis were the triggering factors.  She does not seem to have any history of hypertension, but she had been a longtime smoker until her hospitalization and now has been a nonsmoker since then for the last month.  I congratulated her on quitting smoking.  I encouraged her not to start again.  However, she is certainly at risk for recurrent atrial fibrillation and I recommended that she initiate anticoagulation due to her increased risk of stroke.  Her CHADS-VASc score is 3, even though we do not know her coronary disease/vascular status.      She is somewhat hesitant to start blood thinners.  We talked  about that at length.  I did indicate that there is a risk of bleeding, mostly minor bleeding, but the risk of major bleeding is about 0.8% per year.  However, her risk of stroke is probably in the range of 4-6% per year, which would certainly favor taking a blood thinner to prevent stroke.  She seemed to understand this discussion and will look into the cost of newer blood thinners such as Eliquis, Xarelto or Pradaxa.  I did send a prescription in for Eliquis for her.  If that medication turns out to be too expensive, I will have her take warfarin and start with the anticoagulation clinic in [place].      I will also increase her metoprolol back to 25 mg p.o. b.i.d. to obtain better rate control if she does happen to go back into atrial fibrillation.  I will have her follow up with us in Cardiology Clinic in 6 months to see how she is doing with this.      cc:   KRISTEN Keating, CNP   [facility]    [address] [place] [address]         MCKAY HIGGINS MD, [facility]             D: 2019   T: 2019   MT: NASIM      Name:     RAYO NIELSEN   MRN:      [id number]        Account:      [id number]   :      06/15/1933           Service Date: 2019      Document: [id number]

## 2019-05-07 NOTE — LETTER
5/7/2019    Roseann Murillo, APRN CNP  [address] [facility] [address]    RE: Gustavo Palmer       Dear Colleague,    I had the pleasure of seeing Gustavo Palmer in the [facility].    HPI and Plan:   See dictation    Orders Placed This Encounter   Procedures     Follow-Up with Cardiac Advanced Practice Provider       Orders Placed This Encounter   Medications     metoprolol tartrate (LOPRESSOR) 25 MG tablet     Sig: Take 1 tablet (25 mg) by mouth 2 times daily     Dispense:  180 tablet     Refill:  3     apixaban ANTICOAGULANT (ELIQUIS) 5 MG tablet     Sig: Take 1 tablet (5 mg) by mouth 2 times daily     Dispense:  60 tablet     Refill:  11       Medications Discontinued During This Encounter   Medication Reason     metoprolol tartrate (LOPRESSOR) 25 MG tablet          Encounter Diagnosis   Name Primary?     Paroxysmal atrial fibrillation (H) Yes       CURRENT MEDICATIONS:  Current Outpatient Medications   Medication Sig Dispense Refill     albuterol (PROVENTIL) (2.5 MG/3ML) 0.083% neb solution Take 1 vial (2.5 mg) by nebulization every 4 hours as needed for wheezing       apixaban ANTICOAGULANT (ELIQUIS) 5 MG tablet Take 1 tablet (5 mg) by mouth 2 times daily 60 tablet 11     benzocaine-menthol (CHLORASEPTIC) 6-10 MG lozenge Place 1 lozenge inside cheek every hour as needed for sore throat       loperamide (IMODIUM A-D) 2 MG tablet Take 1 tablet (2 mg) by mouth 4 times daily as needed for diarrhea       metoprolol tartrate (LOPRESSOR) 25 MG tablet Take 1 tablet (25 mg) by mouth 2 times daily 180 tablet 3     nicotine (NICORETTE) 4 MG lozenge Place 4 mg inside cheek every hour as needed for smoking cessation         ALLERGIES     Allergies   Allergen Reactions     No Known Drug Allergies        PAST MEDICAL HISTORY:  Past Medical History:   Diagnosis Date     Unspecified hemorrhoids without mention of complication     Hemorrhoids     Unspecified nonsenile cataract        PAST SURGICAL  HISTORY:  Past Surgical History:   Procedure Laterality Date     C TOTAL HIP ARTHROPLASTY  01/26/10    Right     HC REMV CATARACT EXTRACAP,INSERT LENS  2/17/2004    Right     HC REMV CATARACT EXTRACAP,INSERT LENS  3/16/2004    Left       FAMILY HISTORY:  Family History   Problem Relation Age of Onset     Diabetes Brother         x2 brothers     Cancer Mother         lung cancer       SOCIAL HISTORY:  Social History     Socioeconomic History     Marital status:      Spouse name: Not on file     Number of children: 6     Years of education: 10     Highest education level: Not on file   Occupational History     Occupation: dish washing     Comment: [employer]   Social Needs     Financial resource strain: Not on file     Food insecurity:     Worry: Not on file     Inability: Not on file     Transportation needs:     Medical: Not on file     Non-medical: Not on file   Tobacco Use     Smoking status: Former Smoker     Packs/day: 1.00     Years: 40.00     Pack years: 40.00     Smokeless tobacco: Never Used   Substance and Sexual Activity     Alcohol use: Yes     Comment: occ wine     Drug use: No     Sexual activity: Not Currently   Lifestyle     Physical activity:     Days per week: Not on file     Minutes per session: Not on file     Stress: Not on file   Relationships     Social connections:     Talks on phone: Not on file     Gets together: Not on file     Attends Evangelical service: Not on file     Active member of club or organization: Not on file     Attends meetings of clubs or organizations: Not on file     Relationship status: Not on file     Intimate partner violence:     Fear of current or ex partner: Not on file     Emotionally abused: Not on file     Physically abused: Not on file     Forced sexual activity: Not on file   Other Topics Concern      Service No     Blood Transfusions No     Caffeine Concern No     Comment: coffee  10 cups/day     Occupational Exposure Yes     Comment:  "hot water at work     Hobby Hazards No     Sleep Concern No     Stress Concern No     Weight Concern No     Special Diet Yes     Comment: lo fat diet     Back Care No     Exercise Yes     Comment: walking at work      Bike Helmet No     Seat Belt Yes     Self-Exams Yes     Parent/sibling w/ CABG, MI or angioplasty before 65F 55M? Not Asked   Social History Narrative     Not on file       Review of Systems:  Skin:  Negative       Eyes:  Positive for glasses    ENT:  Negative      Respiratory:  Negative       Cardiovascular:  Negative for;palpitations;chest pain;edema;lightheadedness;dizziness      Gastroenterology: Negative      Genitourinary:  Negative      Musculoskeletal:  Negative      Neurologic:  Negative      Psychiatric:  Negative      Heme/Lymph/Imm:  Negative      Endocrine:  Negative        Physical Exam:  Vitals: /80 (BP Location: Right arm, Patient Position: Fowlers, Cuff Size: Adult Regular)   Pulse 70   Ht 1.6 m (5' 3\")   Wt 74 kg (163 lb 1.6 oz)   SpO2 97%   BMI 28.89 kg/m       Constitutional:  cooperative, alert and oriented, well developed, well nourished, in no acute distress        Skin:  warm and dry to the touch, no apparent skin lesions or masses noted          Head:  normocephalic, no masses or lesions        Eyes:  pupils equal and round, conjunctivae and lids unremarkable, sclera white, no xanthalasma, EOMS intact, no nystagmus        Lymph:No Cervical lymphadenopathy present     ENT:  no pallor or cyanosis        Neck:  carotid pulses are full and equal bilaterally, JVP normal, no carotid bruit        Respiratory:  normal breath sounds, clear to auscultation, normal A-P diameter, normal symmetry, normal respiratory excursion, no use of accessory muscles         Cardiac: regular rhythm, normal S1/S2, no S3 or S4, apical impulse not displaced, no murmurs, gallops or rubs                                                         GI:  abdomen soft;BS normoactive        Extremities and " Muscular Skeletal:  no deformities, clubbing, cyanosis, erythema observed;no edema              Neurological:  no gross motor deficits;affect appropriate        Psych:  oriented to time, person and place        CC  Magalys Payton MD  [address] [place] [address]                Thank you for allowing me to participate in the care of your patient.      Sincerely,     MCKAY HIGGINS MD     [facility]    cc:   Magalys Payton MD  [address] [place] [id number]

## 2019-05-07 NOTE — PROGRESS NOTES
HPI and Plan:   See dictation    Orders Placed This Encounter   Procedures     Follow-Up with Cardiac Advanced Practice Provider       Orders Placed This Encounter   Medications     metoprolol tartrate (LOPRESSOR) 25 MG tablet     Sig: Take 1 tablet (25 mg) by mouth 2 times daily     Dispense:  180 tablet     Refill:  3     apixaban ANTICOAGULANT (ELIQUIS) 5 MG tablet     Sig: Take 1 tablet (5 mg) by mouth 2 times daily     Dispense:  60 tablet     Refill:  11       Medications Discontinued During This Encounter   Medication Reason     metoprolol tartrate (LOPRESSOR) 25 MG tablet          Encounter Diagnosis   Name Primary?     Paroxysmal atrial fibrillation (H) Yes       CURRENT MEDICATIONS:  Current Outpatient Medications   Medication Sig Dispense Refill     albuterol (PROVENTIL) (2.5 MG/3ML) 0.083% neb solution Take 1 vial (2.5 mg) by nebulization every 4 hours as needed for wheezing       apixaban ANTICOAGULANT (ELIQUIS) 5 MG tablet Take 1 tablet (5 mg) by mouth 2 times daily 60 tablet 11     benzocaine-menthol (CHLORASEPTIC) 6-10 MG lozenge Place 1 lozenge inside cheek every hour as needed for sore throat       loperamide (IMODIUM A-D) 2 MG tablet Take 1 tablet (2 mg) by mouth 4 times daily as needed for diarrhea       metoprolol tartrate (LOPRESSOR) 25 MG tablet Take 1 tablet (25 mg) by mouth 2 times daily 180 tablet 3     nicotine (NICORETTE) 4 MG lozenge Place 4 mg inside cheek every hour as needed for smoking cessation         ALLERGIES     Allergies   Allergen Reactions     No Known Drug Allergies        PAST MEDICAL HISTORY:  Past Medical History:   Diagnosis Date     Unspecified hemorrhoids without mention of complication     Hemorrhoids     Unspecified nonsenile cataract        PAST SURGICAL HISTORY:  Past Surgical History:   Procedure Laterality Date     C TOTAL HIP ARTHROPLASTY  01/26/10    Right     HC REMV CATARACT EXTRACAP,INSERT LENS  2/17/2004    Right     HC REMV CATARACT EXTRACAP,INSERT LENS   3/16/2004    Left       FAMILY HISTORY:  Family History   Problem Relation Age of Onset     Diabetes Brother         x2 brothers     Cancer Mother         lung cancer       SOCIAL HISTORY:  Social History     Socioeconomic History     Marital status:      Spouse name: Not on file     Number of children: 6     Years of education: 10     Highest education level: Not on file   Occupational History     Occupation: dish washing     Comment: [employer]   Social Needs     Financial resource strain: Not on file     Food insecurity:     Worry: Not on file     Inability: Not on file     Transportation needs:     Medical: Not on file     Non-medical: Not on file   Tobacco Use     Smoking status: Former Smoker     Packs/day: 1.00     Years: 40.00     Pack years: 40.00     Smokeless tobacco: Never Used   Substance and Sexual Activity     Alcohol use: Yes     Comment: occ wine     Drug use: No     Sexual activity: Not Currently   Lifestyle     Physical activity:     Days per week: Not on file     Minutes per session: Not on file     Stress: Not on file   Relationships     Social connections:     Talks on phone: Not on file     Gets together: Not on file     Attends Sabianist service: Not on file     Active member of club or organization: Not on file     Attends meetings of clubs or organizations: Not on file     Relationship status: Not on file     Intimate partner violence:     Fear of current or ex partner: Not on file     Emotionally abused: Not on file     Physically abused: Not on file     Forced sexual activity: Not on file   Other Topics Concern      Service No     Blood Transfusions No     Caffeine Concern No     Comment: coffee  10 cups/day     Occupational Exposure Yes     Comment: hot water at work     Hobby Hazards No     Sleep Concern No     Stress Concern No     Weight Concern No     Special Diet Yes     Comment: lo fat diet     Back Care No     Exercise Yes     Comment: walking at work   "    Bike Helmet No     Seat Belt Yes     Self-Exams Yes     Parent/sibling w/ CABG, MI or angioplasty before 65F 55M? Not Asked   Social History Narrative     Not on file       Review of Systems:  Skin:  Negative       Eyes:  Positive for glasses    ENT:  Negative      Respiratory:  Negative       Cardiovascular:  Negative for;palpitations;chest pain;edema;lightheadedness;dizziness      Gastroenterology: Negative      Genitourinary:  Negative      Musculoskeletal:  Negative      Neurologic:  Negative      Psychiatric:  Negative      Heme/Lymph/Imm:  Negative      Endocrine:  Negative        Physical Exam:  Vitals: /80 (BP Location: Right arm, Patient Position: Fowlers, Cuff Size: Adult Regular)   Pulse 70   Ht 1.6 m (5' 3\")   Wt 74 kg (163 lb 1.6 oz)   SpO2 97%   BMI 28.89 kg/m      Constitutional:  cooperative, alert and oriented, well developed, well nourished, in no acute distress        Skin:  warm and dry to the touch, no apparent skin lesions or masses noted          Head:  normocephalic, no masses or lesions        Eyes:  pupils equal and round, conjunctivae and lids unremarkable, sclera white, no xanthalasma, EOMS intact, no nystagmus        Lymph:No Cervical lymphadenopathy present     ENT:  no pallor or cyanosis        Neck:  carotid pulses are full and equal bilaterally, JVP normal, no carotid bruit        Respiratory:  normal breath sounds, clear to auscultation, normal A-P diameter, normal symmetry, normal respiratory excursion, no use of accessory muscles         Cardiac: regular rhythm, normal S1/S2, no S3 or S4, apical impulse not displaced, no murmurs, gallops or rubs                                                         GI:  abdomen soft;BS normoactive        Extremities and Muscular Skeletal:  no deformities, clubbing, cyanosis, erythema observed;no edema              Neurological:  no gross motor deficits;affect appropriate        Psych:  oriented to time, person and place  "       CC  Magalys Payton MD  [address] [place] [address]

## 2019-09-04 ENCOUNTER — OFFICE VISIT (OUTPATIENT)
Dept: FAMILY MEDICINE | Facility: OTHER | Age: 84
End: 2019-09-04
Payer: COMMERCIAL

## 2019-09-04 ENCOUNTER — CARE COORDINATION (OUTPATIENT)
Dept: CARDIOLOGY | Facility: CLINIC | Age: 84
End: 2019-09-04

## 2019-09-04 VITALS
RESPIRATION RATE: 20 BRPM | WEIGHT: 165.4 LBS | TEMPERATURE: 95.1 F | OXYGEN SATURATION: 94 % | DIASTOLIC BLOOD PRESSURE: 60 MMHG | HEART RATE: 122 BPM | SYSTOLIC BLOOD PRESSURE: 94 MMHG | BODY MASS INDEX: 29.3 KG/M2

## 2019-09-04 DIAGNOSIS — I48.91 ATRIAL FIBRILLATION WITH RVR (H): Primary | ICD-10-CM

## 2019-09-04 DIAGNOSIS — I48.0 PAROXYSMAL ATRIAL FIBRILLATION (H): ICD-10-CM

## 2019-09-04 PROCEDURE — 99214 OFFICE O/P EST MOD 30 MIN: CPT | Performed by: NURSE PRACTITIONER

## 2019-09-04 PROCEDURE — 93000 ELECTROCARDIOGRAM COMPLETE: CPT | Performed by: NURSE PRACTITIONER

## 2019-09-04 RX ORDER — METOPROLOL TARTRATE 25 MG/1
25 TABLET, FILM COATED ORAL 2 TIMES DAILY
Qty: 180 TABLET | Refills: 3 | Status: SHIPPED | OUTPATIENT
Start: 2019-09-04 | End: 2019-10-24

## 2019-09-04 ASSESSMENT — PAIN SCALES - GENERAL: PAINLEVEL: NO PAIN (0)

## 2019-09-04 NOTE — PROGRESS NOTES
"Call to pt to f/u on message from Roseann Murillo CNP, and recommendations per Dr. Jama. Next ABIGAIL OV available in [place] is 9/10, but Dr. King has an opening tomorrow.     Pt's phone number listed in the chart did not go through, appears to be a bad number, \"your call cannot be completed as dialed.\"    Call attempted x 2.    Call placed to Kirsty, listed as contact in pt's chart. No answer. Left detailed VM for Kirsty stating that the phone # listed for Gustavo is invalid, and asked her to call us back with an updated number for Trynie; or to call Gustavo and have her call us. Reminder sent to Team RN board to watch for call from Kirsty, or continue to re-attempt calls to pt.    Jessie Saini RN on 9/4/2019 at 4:25 PM          Message   Received: Today   Message Contents   Brice Jama MD  [facility] Team 7             Please set up pt for next available ABIGAIL at [place]. Thanks.     Brice    Previous Messages      ----- Message -----   From: Roseann Murillo APRN CNP   Sent: 9/4/2019  11:12 AM   To: MD Dr. Wiley Joseph,     I saw this patient in follow up today. She had stopped her Eliquis due to bleeding concerns.  Was found to be back in atrial fibrillation, rate 110-120's while she was in clinic.  She feels fine.  Is on Metoprolol 25 mg BID for rate control.  I did not increase that due to hypotension.  Wondering if you would recommend switching to something else for rate control?  She did agree to go back on the Eilquis.       Thanks,   Roseann            "

## 2019-09-04 NOTE — PROGRESS NOTES
Subjective     Gustavo Palmer is a 86 year old female who presents to clinic today for the following health issues:    HPI     MEDICATION FOLLOW UP    Atrial Fibrillation Follow-up      Do you check your blood pressure regularly outside of the clinic? Yes     Are you following a low salt diet? Yes    Are your blood pressures ever more than 140 on the top number (systolic) OR more   than 90 on the bottom number (diastolic), for example 140/90? No    Was hospitalized in April of this year with new onset A-fib with RVR.  She converted spontaneously prior to discharge and saw Cardiology in follow up in May.  At that time was in sinus rhythm but started on Eliquis for anticoagulation as she was at risk to return to atrial fibrillation.  She was put on Metoprolol 25 mg BID.  She only took the Eliquis for a short time and then stopped due to concerns over increased bleeding risk.  Continues to take the Metoprolol.  She states she is feeling fine today.  Very active lady, still mows her lawn and drives.  She lives with her daughter and grandson.  She has no other concerns today, just came back in because a 6 month follow up was advised.                Review of Systems   ROS COMP: Constitutional, HEENT, cardiovascular, pulmonary, gi and gu systems are negative, except as otherwise noted.      Objective    BP 96/56   Pulse 78   Temp 95.1  F (35.1  C) (Temporal)   Resp 20   Wt 75 kg (165 lb 6.4 oz)   LMP  (LMP Unknown)   SpO2 94%   Breastfeeding? No   BMI 29.30 kg/m    Body mass index is 29.3 kg/m .  Physical Exam   GENERAL: healthy, alert and no distress  HENT: ear canals and TM's normal, nose and mouth without ulcers or lesions  NECK: no adenopathy  RESP: lungs clear to auscultation - no rales, rhonchi or wheezes  CV: irregularly irregular rhythm and no murmur, click or rub  ABDOMEN: soft, nontender, no hepatosplenomegaly, no masses and bowel sounds normal  MS: no gross musculoskeletal defects noted, no  edema    Diagnostic Test Results:    EKG - atrial fibrillation, rate 122        Assessment & Plan     1. Atrial fibrillation with RVR (H)  She is back in atrial fibrillation.  Rate from 110-120.  She is asymptomatic and I suspect she has been in this for quite some time now.  Is on Metoprolol 25 mg BID.  However, her blood pressure is low today and I therefore I did not increase this dose.  Will consult with cardiology if they want something else for rate control.  I discussed with her the risk of thrombosis and advised she restart her anticoagulant.  Discussed bleeding risks as well.  She will consider restarting this.     - metoprolol tartrate (LOPRESSOR) 25 MG tablet; Take 1 tablet (25 mg) by mouth 2 times daily  Dispense: 180 tablet; Refill: 3  - EKG 12-lead complete w/read - Clinics    2. Paroxysmal atrial fibrillation (H)  - apixaban ANTICOAGULANT (ELIQUIS) 5 MG tablet; Take 1 tablet (5 mg) by mouth 2 times daily  Dispense: 60 tablet; Refill: 11           See Patient Instructions    Return in about 3 months (around 12/4/2019) for Follow up.      I spoke to Dr. Jama, cardiology after patient had left the clinic. Will add on Digoxin 0.125 mg daily and get her into see him in clinic for follow up.     KRISTEN [facility]

## 2019-09-04 NOTE — PATIENT INSTRUCTIONS
Restart your Eliquis.   This will help prevent a stroke or heart attack.     I am going to contact the Cardiologist to see if he wants to switch your Metoprolol to something else for your heart.

## 2019-09-05 ENCOUNTER — TELEPHONE (OUTPATIENT)
Dept: FAMILY MEDICINE | Facility: OTHER | Age: 84
End: 2019-09-05

## 2019-09-05 RX ORDER — DIGOXIN 125 MCG
125 TABLET ORAL DAILY
Qty: 30 TABLET | Refills: 1 | Status: SHIPPED | OUTPATIENT
Start: 2019-09-05 | End: 2019-10-24

## 2019-09-05 NOTE — TELEPHONE ENCOUNTER
Patient contacted, phone number is updated, information given and patient agrees with plan, transferred to specialty scheduling.    Aniya Yepez XRO/

## 2019-09-05 NOTE — TELEPHONE ENCOUNTER
Please call patient and let her know the cardiologist recommended we start another medication for her - Digoxin.  I sent this to the pharmacy.  She also needs to get set up to see the cardiologist in clinic as soon as she can.  Please assist with this.     Electronically signed by Roseann Murillo CNP.

## 2019-09-05 NOTE — PROGRESS NOTES
Call to patient contact number - spoke with Kirsty - she will have patient call today to schedule cardiology visit in [place] per DR Jama request.  Lottie Duong RN 09/05/19 9:41 AM

## 2019-09-10 ENCOUNTER — CARE COORDINATION (OUTPATIENT)
Dept: CARDIOLOGY | Facility: CLINIC | Age: 84
End: 2019-09-10

## 2019-09-10 NOTE — PROGRESS NOTES
Call to pt to review that pt should be seen soon to f/u on her faster heart rates and low blood pressure due to her afib. Spoke w/pt's sister Kirsty. Kirsty reports she did start on digoxin and pt doing ok. Her main concern is that pt voices she does not want to be on a blood thinner. Reviewed that her CHADS2-VASC score is high enough that it is felt safer to be on a blood thinner than not. Kirsty states that pt bruises easily, and if she bumps her hand her fragile skin will tear and cause a little bleeding. She does not describe any worrisome symptoms of bleeding or complications. R/s pt to this Friday at 10:15am with Dr. Jama in [place]. Advised to continue blood thinner and discuss further at OV on Friday, unless concerning bleeding symptoms, and then she is to call us. Kirsty verbalized understanding. Jessie Saini RN on 9/10/2019 at 10:56 AM      Brice Jama MD  [facility] 7             Maria Isabel and Jessie,     Can you see if I ordered a follow up for this patient? I think she is [place]. Thx     EE    Previous Messages      ----- Message -----   From: Roseann Murillo APRN CNP   Sent: 9/5/2019   6:51 AM   To: Brice Jama MD     Thank you!     Roseann   ----- Message -----   From: Brice Jama MD   Sent: 9/4/2019   3:18 PM   To: KRISTEN Winston CNP.     We could give her some digoxin 0.125 mg daily for rate control, but it may not help much. Often, the BP will come up with better rate control, even if we increase the metoprolol. However, I understand your hesitation. Nothing else will work better. We may need to do an AV node ablation and PM. I will set her up to return to cardiology clinic at our next available appt.     Brice Jama   ----- Message -----   From: Roseann Murillo APRN CNP   Sent: 9/4/2019  11:12 AM   To: MD Dr. Wiley Joseph,     I saw this patient in follow up today. She had stopped her Eliquis due to bleeding concerns.  Was found to be  back in atrial fibrillation, rate 110-120's while she was in clinic.  She feels fine.  Is on Metoprolol 25 mg BID for rate control.  I did not increase that due to hypotension.  Wondering if you would recommend switching to something else for rate control?  She did agree to go back on the Eilquis.       Thanks,   Roseann

## 2019-10-24 ENCOUNTER — OFFICE VISIT (OUTPATIENT)
Dept: CARDIOLOGY | Facility: CLINIC | Age: 84
End: 2019-10-24
Payer: COMMERCIAL

## 2019-10-24 VITALS
HEART RATE: 63 BPM | RESPIRATION RATE: 16 BRPM | OXYGEN SATURATION: 95 % | SYSTOLIC BLOOD PRESSURE: 136 MMHG | DIASTOLIC BLOOD PRESSURE: 52 MMHG

## 2019-10-24 DIAGNOSIS — I48.0 PAROXYSMAL ATRIAL FIBRILLATION (H): Primary | ICD-10-CM

## 2019-10-24 DIAGNOSIS — I10 BENIGN ESSENTIAL HYPERTENSION: ICD-10-CM

## 2019-10-24 DIAGNOSIS — Z72.0 TOBACCO USE: ICD-10-CM

## 2019-10-24 PROCEDURE — 99214 OFFICE O/P EST MOD 30 MIN: CPT | Performed by: INTERNAL MEDICINE

## 2019-10-24 RX ORDER — DIGOXIN 125 MCG
125 TABLET ORAL DAILY
Qty: 90 TABLET | Refills: 3 | Status: SHIPPED | OUTPATIENT
Start: 2019-10-24 | End: 2020-07-20

## 2019-10-24 RX ORDER — METOPROLOL TARTRATE 25 MG/1
25 TABLET, FILM COATED ORAL 2 TIMES DAILY
Qty: 180 TABLET | Refills: 3 | Status: SHIPPED | OUTPATIENT
Start: 2019-10-24 | End: 2020-09-17

## 2019-10-24 SDOH — HEALTH STABILITY: MENTAL HEALTH: HOW OFTEN DO YOU HAVE A DRINK CONTAINING ALCOHOL?: NEVER

## 2019-10-24 ASSESSMENT — PAIN SCALES - GENERAL: PAINLEVEL: NO PAIN (0)

## 2019-10-24 NOTE — LETTER
10/24/2019    Roseann Murillo, APRN CNP  [address] [facility] [address]    RE: Gustavo Palmer       Dear Colleague,    I had the pleasure of seeing Gustavo Palmer in the [facility].    HPI and Plan:   See dictation    Orders Placed This Encounter   Procedures     Basic metabolic panel     Follow-Up with Cardiologist     Echocardiogram Complete       Orders Placed This Encounter   Medications     digoxin (LANOXIN) 125 MCG tablet     Sig: Take 1 tablet (125 mcg) by mouth daily     Dispense:  90 tablet     Refill:  3     apixaban ANTICOAGULANT (ELIQUIS) 5 MG tablet     Sig: Take 1 tablet (5 mg) by mouth 2 times daily     Dispense:  180 tablet     Refill:  3     metoprolol tartrate (LOPRESSOR) 25 MG tablet     Sig: Take 1 tablet (25 mg) by mouth 2 times daily     Dispense:  180 tablet     Refill:  3       Medications Discontinued During This Encounter   Medication Reason     digoxin (LANOXIN) 125 MCG tablet Reorder     apixaban ANTICOAGULANT (ELIQUIS) 5 MG tablet Reorder     metoprolol tartrate (LOPRESSOR) 25 MG tablet Reorder         Encounter Diagnoses   Name Primary?     Paroxysmal atrial fibrillation (H) Yes     Tobacco use      Benign essential hypertension        CURRENT MEDICATIONS:  Current Outpatient Medications   Medication Sig Dispense Refill     apixaban ANTICOAGULANT (ELIQUIS) 5 MG tablet Take 1 tablet (5 mg) by mouth 2 times daily 180 tablet 3     digoxin (LANOXIN) 125 MCG tablet Take 1 tablet (125 mcg) by mouth daily 90 tablet 3     metoprolol tartrate (LOPRESSOR) 25 MG tablet Take 1 tablet (25 mg) by mouth 2 times daily 180 tablet 3     albuterol (PROVENTIL) (2.5 MG/3ML) 0.083% neb solution Take 1 vial (2.5 mg) by nebulization every 4 hours as needed for wheezing (Patient not taking: Reported on 10/24/2019)       benzocaine-menthol (CHLORASEPTIC) 6-10 MG lozenge Place 1 lozenge inside cheek every hour as needed for sore throat (Patient not taking: Reported on 10/24/2019)          ALLERGIES     Allergies   Allergen Reactions     No Known Drug Allergies        PAST MEDICAL HISTORY:  Past Medical History:   Diagnosis Date     Unspecified hemorrhoids without mention of complication     Hemorrhoids     Unspecified nonsenile cataract        PAST SURGICAL HISTORY:  Past Surgical History:   Procedure Laterality Date     C TOTAL HIP ARTHROPLASTY  01/26/10    Right     HC REMV CATARACT EXTRACAP,INSERT LENS  2/17/2004    Right     HC REMV CATARACT EXTRACAP,INSERT LENS  3/16/2004    Left       FAMILY HISTORY:  Family History   Problem Relation Age of Onset     Diabetes Brother         x2 brothers     Cancer Mother         lung cancer       SOCIAL HISTORY:  Social History     Socioeconomic History     Marital status:      Spouse name: None     Number of children: 6     Years of education: 10     Highest education level: None   Occupational History     Occupation: dish washing     Comment: [employer]   Social Needs     Financial resource strain: None     Food insecurity:     Worry: None     Inability: None     Transportation needs:     Medical: None     Non-medical: None   Tobacco Use     Smoking status: Former Smoker     Packs/day: 0.50     Years: 40.00     Pack years: 20.00     Types: Cigarettes     Smokeless tobacco: Never Used   Substance and Sexual Activity     Alcohol use: Never     Frequency: Never     Comment: occ wine     Drug use: No     Sexual activity: Not Currently   Lifestyle     Physical activity:     Days per week: None     Minutes per session: None     Stress: None   Relationships     Social connections:     Talks on phone: None     Gets together: None     Attends Yazdanism service: None     Active member of club or organization: None     Attends meetings of clubs or organizations: None     Relationship status: None     Intimate partner violence:     Fear of current or ex partner: None     Emotionally abused: None     Physically abused: None     Forced sexual  activity: None   Other Topics Concern      Service No     Blood Transfusions No     Caffeine Concern No     Comment: coffee  10 cups/day     Occupational Exposure Yes     Comment: hot water at work     Hobby Hazards No     Sleep Concern No     Stress Concern No     Weight Concern No     Special Diet Yes     Comment: lo fat diet     Back Care No     Exercise Yes     Comment: walking at work      Bike Helmet No     Seat Belt Yes     Self-Exams Yes     Parent/sibling w/ CABG, MI or angioplasty before 65F 55M? Not Asked   Social History Narrative     None       Review of Systems:  Skin:  Negative       Eyes:  Positive for glasses    ENT:  Negative      Respiratory:  Positive for dyspnea on exertion     Cardiovascular:    exercise intolerance;Positive for    Gastroenterology: Negative      Genitourinary:  Negative      Musculoskeletal:  Negative      Neurologic:  Negative      Psychiatric:  Negative      Heme/Lymph/Imm:  Negative      Endocrine:  Negative        Physical Exam:  Vitals: /52   Pulse 63   Resp 16   LMP  (LMP Unknown)   SpO2 95%     Constitutional:  cooperative, alert and oriented, well developed, well nourished, in no acute distress        Skin:  warm and dry to the touch, no apparent skin lesions or masses noted          Head:  normocephalic, no masses or lesions        Eyes:  pupils equal and round, conjunctivae and lids unremarkable, sclera white, no xanthalasma, EOMS intact, no nystagmus        Lymph:No Cervical lymphadenopathy present     ENT:  no pallor or cyanosis        Neck:  carotid pulses are full and equal bilaterally, JVP normal, no carotid bruit        Respiratory:  normal breath sounds, clear to auscultation, normal A-P diameter, normal symmetry, normal respiratory excursion, no use of accessory muscles         Cardiac: regular rhythm, normal S1/S2, no S3 or S4, apical impulse not displaced, no murmurs, gallops or rubs                                                          GI:  abdomen soft;BS normoactive        Extremities and Muscular Skeletal:  no deformities, clubbing, cyanosis, erythema observed;no edema              Neurological:  no gross motor deficits;affect appropriate        Psych:  oriented to time, person and place        CC  Mckay Jama MD  [address]  [place] [address]                Thank you for allowing me to participate in the care of your patient.      Sincerely,     MCKAY JAMA MD     [facility]    cc:   Mckay Jama MD  [address]  [place] [id number]

## 2019-10-24 NOTE — PROGRESS NOTES
Service Date: 10/24/2019      KRISTEN Keating, CNP   [facility]    [address] [place] [address]      RE: Gustavo Palmer    MRN: [id number]   : 06/15/1933      Dear Roseann:       I had the opportunity to see Ms. Gustavo Palmer in Cardiology Clinic today at the [facility] in [place] for reevaluation of paroxysmal atrial fibrillation and hypertension.  Ms. Palmer is an 86-year-old woman who was discovered to have atrial fibrillation last spring.  Initially, her heart rate was rapid, and she had some generalized weakness and fatigue as her primary complaint.  She was started on metoprolol for heart rate control and found to have evidence of pneumonia with an elevated lactic acid, suggestive of sepsis syndrome.  She was hospitalized here in [place] and then transferred to a transitional care unit prior to returning home.  During her stay in the transitional care unit, she converted spontaneously back to sinus rhythm.  I saw her after that for consultation in May.  I convinced her to start Eliquis for stroke prevention, but she stayed on that medication only for a short time due to concerns about bleeding.  She did not actually have any bleeding.       When she returned for a routine followup visit in September to her primary care office, she was discovered to be back in atrial fibrillation with a rapid ventricular response with a heart rate of 122 beats per minute and a blood pressure of 94/60.  We discussed her situation and decided to add a low dose of digoxin for heart rate control.  She was unaware of the atrial fibrillation at that time.  She had none of the other symptoms, including weakness and fatigue, that she had in the spring.  I suspect those symptoms were primarily due to her pneumonia and sepsis.      She tells me that her heart rate and blood pressure have been better at home since then.  She now returns to Cardiology Clinic feeling well without any specific  complaints.  She gets a little short of breath if she exerts herself too much, including walking long distances, but this has been stable over the course of the last several years and does not seem to be specific to atrial fibrillation.  She does not feel palpitations, lightheadedness, weakness or fatigue when she has atrial fibrillation.  She has no symptoms of chest discomfort.  She had stopped smoking when she was in the transitional care unit, but unfortunately started back up again when she went home.      PHYSICAL EXAMINATION:  Today her blood pressure is 136/52, heart rate is 63, and her weight is about 165 pounds.  Her lungs are clear.  Heart rhythm is quite regular today.  She has no cardiac murmurs and no edema.      IMPRESSIONS:  Ms. Gustavo Nielsen is an 86-year-old woman with hypertension and paroxysmal atrial fibrillation.  She is unaware of her atrial fibrillation when it occurs, even though heart rates are rapid.  At this point, she is back in sinus rhythm and feeling fine.  She has no other concerning cardiac issues.  Her blood pressure seems well controlled when she is in normal rhythm.      I suggested that she stop smoking.  She is taking the blood thinner now and agrees to continue that for stroke prevention.  I think she understands that issue quite well.  I will leave her on the metoprolol and the digoxin for now and have her follow up with me again in 1 year for reevaluation.      Sincerely,      Mckay Jama MD, FACC         MCKAY JAMA MD, FACC             D: 10/24/2019   T: 10/24/2019   MT: ting      Name:     GUSTAVO NIELSEN   MRN:      [id number]        Account:      [id number]   :      06/15/1933           Service Date: 10/24/2019      Document: [id number]

## 2019-10-24 NOTE — PROGRESS NOTES
HPI and Plan:   See dictation    Orders Placed This Encounter   Procedures     Basic metabolic panel     Follow-Up with Cardiologist     Echocardiogram Complete       Orders Placed This Encounter   Medications     digoxin (LANOXIN) 125 MCG tablet     Sig: Take 1 tablet (125 mcg) by mouth daily     Dispense:  90 tablet     Refill:  3     apixaban ANTICOAGULANT (ELIQUIS) 5 MG tablet     Sig: Take 1 tablet (5 mg) by mouth 2 times daily     Dispense:  180 tablet     Refill:  3     metoprolol tartrate (LOPRESSOR) 25 MG tablet     Sig: Take 1 tablet (25 mg) by mouth 2 times daily     Dispense:  180 tablet     Refill:  3       Medications Discontinued During This Encounter   Medication Reason     digoxin (LANOXIN) 125 MCG tablet Reorder     apixaban ANTICOAGULANT (ELIQUIS) 5 MG tablet Reorder     metoprolol tartrate (LOPRESSOR) 25 MG tablet Reorder         Encounter Diagnoses   Name Primary?     Paroxysmal atrial fibrillation (H) Yes     Tobacco use      Benign essential hypertension        CURRENT MEDICATIONS:  Current Outpatient Medications   Medication Sig Dispense Refill     apixaban ANTICOAGULANT (ELIQUIS) 5 MG tablet Take 1 tablet (5 mg) by mouth 2 times daily 180 tablet 3     digoxin (LANOXIN) 125 MCG tablet Take 1 tablet (125 mcg) by mouth daily 90 tablet 3     metoprolol tartrate (LOPRESSOR) 25 MG tablet Take 1 tablet (25 mg) by mouth 2 times daily 180 tablet 3     albuterol (PROVENTIL) (2.5 MG/3ML) 0.083% neb solution Take 1 vial (2.5 mg) by nebulization every 4 hours as needed for wheezing (Patient not taking: Reported on 10/24/2019)       benzocaine-menthol (CHLORASEPTIC) 6-10 MG lozenge Place 1 lozenge inside cheek every hour as needed for sore throat (Patient not taking: Reported on 10/24/2019)         ALLERGIES     Allergies   Allergen Reactions     No Known Drug Allergies        PAST MEDICAL HISTORY:  Past Medical History:   Diagnosis Date     Unspecified hemorrhoids without mention of complication      Hemorrhoids     Unspecified nonsenile cataract        PAST SURGICAL HISTORY:  Past Surgical History:   Procedure Laterality Date     C TOTAL HIP ARTHROPLASTY  01/26/10    Right     HC REMV CATARACT EXTRACAP,INSERT LENS  2/17/2004    Right     HC REMV CATARACT EXTRACAP,INSERT LENS  3/16/2004    Left       FAMILY HISTORY:  Family History   Problem Relation Age of Onset     Diabetes Brother         x2 brothers     Cancer Mother         lung cancer       SOCIAL HISTORY:  Social History     Socioeconomic History     Marital status:      Spouse name: None     Number of children: 6     Years of education: 10     Highest education level: None   Occupational History     Occupation: dish washing     Comment: [employer]   Social Needs     Financial resource strain: None     Food insecurity:     Worry: None     Inability: None     Transportation needs:     Medical: None     Non-medical: None   Tobacco Use     Smoking status: Former Smoker     Packs/day: 0.50     Years: 40.00     Pack years: 20.00     Types: Cigarettes     Smokeless tobacco: Never Used   Substance and Sexual Activity     Alcohol use: Never     Frequency: Never     Comment: occ wine     Drug use: No     Sexual activity: Not Currently   Lifestyle     Physical activity:     Days per week: None     Minutes per session: None     Stress: None   Relationships     Social connections:     Talks on phone: None     Gets together: None     Attends Quaker service: None     Active member of club or organization: None     Attends meetings of clubs or organizations: None     Relationship status: None     Intimate partner violence:     Fear of current or ex partner: None     Emotionally abused: None     Physically abused: None     Forced sexual activity: None   Other Topics Concern      Service No     Blood Transfusions No     Caffeine Concern No     Comment: coffee  10 cups/day     Occupational Exposure Yes     Comment: hot water at work     Hobby  Hazards No     Sleep Concern No     Stress Concern No     Weight Concern No     Special Diet Yes     Comment: lo fat diet     Back Care No     Exercise Yes     Comment: walking at work      Bike Helmet No     Seat Belt Yes     Self-Exams Yes     Parent/sibling w/ CABG, MI or angioplasty before 65F 55M? Not Asked   Social History Narrative     None       Review of Systems:  Skin:  Negative       Eyes:  Positive for glasses    ENT:  Negative      Respiratory:  Positive for dyspnea on exertion     Cardiovascular:    exercise intolerance;Positive for    Gastroenterology: Negative      Genitourinary:  Negative      Musculoskeletal:  Negative      Neurologic:  Negative      Psychiatric:  Negative      Heme/Lymph/Imm:  Negative      Endocrine:  Negative        Physical Exam:  Vitals: /52   Pulse 63   Resp 16   LMP  (LMP Unknown)   SpO2 95%     Constitutional:  cooperative, alert and oriented, well developed, well nourished, in no acute distress        Skin:  warm and dry to the touch, no apparent skin lesions or masses noted          Head:  normocephalic, no masses or lesions        Eyes:  pupils equal and round, conjunctivae and lids unremarkable, sclera white, no xanthalasma, EOMS intact, no nystagmus        Lymph:No Cervical lymphadenopathy present     ENT:  no pallor or cyanosis        Neck:  carotid pulses are full and equal bilaterally, JVP normal, no carotid bruit        Respiratory:  normal breath sounds, clear to auscultation, normal A-P diameter, normal symmetry, normal respiratory excursion, no use of accessory muscles         Cardiac: regular rhythm, normal S1/S2, no S3 or S4, apical impulse not displaced, no murmurs, gallops or rubs                                                         GI:  abdomen soft;BS normoactive        Extremities and Muscular Skeletal:  no deformities, clubbing, cyanosis, erythema observed;no edema              Neurological:  no gross motor deficits;affect appropriate         Psych:  oriented to time, person and place        CC  Brice Jama MD  [address]  [place] [id number]

## 2019-10-24 NOTE — LETTER
10/24/2019      KRISTEN Winston CNP  [address] [facility] [address]      RE: Gustavo Palmer       Dear Colleague,    I had the pleasure of seeing Gustavo Palmer in the [facility].    Service Date: 10/24/2019      KRISTEN Keating, CNP   [facility]    [address] [place] [address]      RE: Gustavo Palmer    MRN: [id number]   : 06/15/1933      Dear Roseann:       I had the opportunity to see Ms. Gustavo Palmer in Cardiology Clinic today at the [facility] in [place] for reevaluation of paroxysmal atrial fibrillation and hypertension.  Ms. Palmer is an 86-year-old woman who was discovered to have atrial fibrillation last spring.  Initially, her heart rate was rapid, and she had some generalized weakness and fatigue as her primary complaint.  She was started on metoprolol for heart rate control and found to have evidence of pneumonia with an elevated lactic acid, suggestive of sepsis syndrome.  She was hospitalized here in [place] and then transferred to a transitional care unit prior to returning home.  During her stay in the transitional care unit, she converted spontaneously back to sinus rhythm.  I saw her after that for consultation in May.  I convinced her to start Eliquis for stroke prevention, but she stayed on that medication only for a short time due to concerns about bleeding.  She did not actually have any bleeding.       When she returned for a routine followup visit in September to her primary care office, she was discovered to be back in atrial fibrillation with a rapid ventricular response with a heart rate of 122 beats per minute and a blood pressure of 94/60.  We discussed her situation and decided to add a low dose of digoxin for heart rate control.  She was unaware of the atrial fibrillation at that time.  She had none of the other symptoms, including weakness and fatigue, that she had in the spring.  I suspect those  symptoms were primarily due to her pneumonia and sepsis.      She tells me that her heart rate and blood pressure have been better at home since then.  She now returns to Cardiology Clinic feeling well without any specific complaints.  She gets a little short of breath if she exerts herself too much, including walking long distances, but this has been stable over the course of the last several years and does not seem to be specific to atrial fibrillation.  She does not feel palpitations, lightheadedness, weakness or fatigue when she has atrial fibrillation.  She has no symptoms of chest discomfort.  She had stopped smoking when she was in the transitional care unit, but unfortunately started back up again when she went home.      PHYSICAL EXAMINATION:  Today her blood pressure is 136/52, heart rate is 63, and her weight is about 165 pounds.  Her lungs are clear.  Heart rhythm is quite regular today.  She has no cardiac murmurs and no edema.      IMPRESSIONS:  Ms. Gustavo Nielsen is an 86-year-old woman with hypertension and paroxysmal atrial fibrillation.  She is unaware of her atrial fibrillation when it occurs, even though heart rates are rapid.  At this point, she is back in sinus rhythm and feeling fine.  She has no other concerning cardiac issues.  Her blood pressure seems well controlled when she is in normal rhythm.      I suggested that she stop smoking.  She is taking the blood thinner now and agrees to continue that for stroke prevention.  I think she understands that issue quite well.  I will leave her on the metoprolol and the digoxin for now and have her follow up with me again in 1 year for reevaluation.      Sincerely,      Mckay Jama MD, FACC         MCKAY JAMA MD, FACC             D: 10/24/2019   T: 10/24/2019   MT: ting      Name:     GUSTAVO NIELSEN   MRN:      [id number]        Account:      [id number]   :      06/15/1933           Service Date: 10/24/2019      Document: [id number]          Outpatient Encounter Medications as of 10/24/2019   Medication Sig Dispense Refill     apixaban ANTICOAGULANT (ELIQUIS) 5 MG tablet Take 1 tablet (5 mg) by mouth 2 times daily 180 tablet 3     digoxin (LANOXIN) 125 MCG tablet Take 1 tablet (125 mcg) by mouth daily 90 tablet 3     metoprolol tartrate (LOPRESSOR) 25 MG tablet Take 1 tablet (25 mg) by mouth 2 times daily 180 tablet 3     albuterol (PROVENTIL) (2.5 MG/3ML) 0.083% neb solution Take 1 vial (2.5 mg) by nebulization every 4 hours as needed for wheezing (Patient not taking: Reported on 10/24/2019)       benzocaine-menthol (CHLORASEPTIC) 6-10 MG lozenge Place 1 lozenge inside cheek every hour as needed for sore throat (Patient not taking: Reported on 10/24/2019)       [DISCONTINUED] apixaban ANTICOAGULANT (ELIQUIS) 5 MG tablet Take 1 tablet (5 mg) by mouth 2 times daily 60 tablet 11     [DISCONTINUED] digoxin (LANOXIN) 125 MCG tablet Take 1 tablet (125 mcg) by mouth daily 30 tablet 1     [DISCONTINUED] metoprolol tartrate (LOPRESSOR) 25 MG tablet Take 1 tablet (25 mg) by mouth 2 times daily 180 tablet 3     No facility-administered encounter medications on file as of 10/24/2019.        Again, thank you for allowing me to participate in the care of your patient.      Sincerely,    MCKAY HIGGINS MD     [facility]

## 2020-07-20 DIAGNOSIS — I48.91 ATRIAL FIBRILLATION WITH RVR (H): Primary | ICD-10-CM

## 2020-07-20 RX ORDER — DIGOXIN 125 MCG
125 TABLET ORAL DAILY
Qty: 90 TABLET | Refills: 1 | Status: SHIPPED | OUTPATIENT
Start: 2020-07-20 | End: 2020-09-15

## 2020-09-15 ENCOUNTER — TELEPHONE (OUTPATIENT)
Dept: CARDIOLOGY | Facility: CLINIC | Age: 85
End: 2020-09-15

## 2020-09-15 ENCOUNTER — HOSPITAL ENCOUNTER (OUTPATIENT)
Dept: CARDIOLOGY | Facility: CLINIC | Age: 85
Discharge: HOME | End: 2020-09-15
Attending: INTERNAL MEDICINE | Admitting: INTERNAL MEDICINE
Payer: COMMERCIAL

## 2020-09-15 DIAGNOSIS — I48.0 PAROXYSMAL ATRIAL FIBRILLATION (H): ICD-10-CM

## 2020-09-15 DIAGNOSIS — I48.91 ATRIAL FIBRILLATION WITH RVR (H): ICD-10-CM

## 2020-09-15 LAB
ANION GAP SERPL CALCULATED.3IONS-SCNC: 2 MMOL/L (ref 3–14)
BUN SERPL-MCNC: 13 MG/DL (ref 7–30)
CALCIUM SERPL-MCNC: 8.9 MG/DL (ref 8.5–10.1)
CHLORIDE SERPL-SCNC: 108 MMOL/L (ref 94–109)
CO2 SERPL-SCNC: 30 MMOL/L (ref 20–32)
CREAT SERPL-MCNC: 0.88 MG/DL (ref 0.52–1.04)
GFR SERPL CREATININE-BSD FRML MDRD: 59 ML/MIN/{1.73_M2}
GLUCOSE SERPL-MCNC: 104 MG/DL (ref 70–99)
POTASSIUM SERPL-SCNC: 4.3 MMOL/L (ref 3.4–5.3)
SODIUM SERPL-SCNC: 140 MMOL/L (ref 133–144)

## 2020-09-15 PROCEDURE — 93306 TTE W/DOPPLER COMPLETE: CPT

## 2020-09-15 PROCEDURE — 80048 BASIC METABOLIC PNL TOTAL CA: CPT | Performed by: INTERNAL MEDICINE

## 2020-09-15 PROCEDURE — 93306 TTE W/DOPPLER COMPLETE: CPT | Mod: 26 | Performed by: INTERNAL MEDICINE

## 2020-09-15 PROCEDURE — 36415 COLL VENOUS BLD VENIPUNCTURE: CPT | Performed by: INTERNAL MEDICINE

## 2020-09-15 RX ORDER — DIGOXIN 125 MCG
125 TABLET ORAL DAILY
Qty: 90 TABLET | Refills: 1 | Status: SHIPPED | OUTPATIENT
Start: 2020-09-15 | End: 2020-09-17

## 2020-09-17 ENCOUNTER — VIRTUAL VISIT (OUTPATIENT)
Dept: CARDIOLOGY | Facility: CLINIC | Age: 85
End: 2020-09-17
Attending: INTERNAL MEDICINE
Payer: COMMERCIAL

## 2020-09-17 DIAGNOSIS — I48.91 ATRIAL FIBRILLATION WITH RVR (H): ICD-10-CM

## 2020-09-17 DIAGNOSIS — Z72.0 TOBACCO USE: ICD-10-CM

## 2020-09-17 DIAGNOSIS — I10 BENIGN ESSENTIAL HYPERTENSION: ICD-10-CM

## 2020-09-17 DIAGNOSIS — I48.0 PAROXYSMAL ATRIAL FIBRILLATION (H): Primary | ICD-10-CM

## 2020-09-17 PROBLEM — N18.30 CKD (CHRONIC KIDNEY DISEASE) STAGE 3, GFR 30-59 ML/MIN (H): Status: ACTIVE | Noted: 2020-09-17

## 2020-09-17 PROCEDURE — 99214 OFFICE O/P EST MOD 30 MIN: CPT | Mod: TEL | Performed by: INTERNAL MEDICINE

## 2020-09-17 RX ORDER — METOPROLOL TARTRATE 25 MG/1
25 TABLET, FILM COATED ORAL 2 TIMES DAILY
Qty: 180 TABLET | Refills: 3 | Status: SHIPPED | OUTPATIENT
Start: 2020-09-17 | End: 2021-09-28

## 2020-09-17 RX ORDER — DIGOXIN 125 MCG
125 TABLET ORAL DAILY
Qty: 90 TABLET | Refills: 3 | Status: SHIPPED | OUTPATIENT
Start: 2020-09-17 | End: 2021-03-26

## 2020-09-17 NOTE — LETTER
"9/17/2020    Roseann Murillo, APRN CNP  [address] [facility] [address]    RE: Gustavo Palmer       Dear Colleague,    I had the pleasure of seeing Gustavo Palmer in the [facility].      The patient has been notified of following:     \"This telephone visit will be conducted via a call between you and your physician/provider. We have found that certain health care needs can be provided without the need for a physical exam.  This service lets us provide the care you need with a short phone conversation.  If a prescription is necessary we can send it directly to your pharmacy.  If lab work is needed we can place an order for that and you can then stop by our lab to have the test done at a later time.    Telephone visits are billed at different rates depending on your insurance coverage. During this emergency period, for some insurers they may be billed the same as an in-person visit.  Please reach out to your insurance provider with any questions.    If during the course of the call the physician/provider feels a telephone visit is not appropriate, you will not be charged for this service.\"    Patient has given verbal consent for Telephone visit?  Yes    What phone number would you like to be contacted at? [phone]    How would you like to obtain your AVS? Mail a copy    Blood pressure: has a neighbor that is a nurse and has her check it occasionally - per patient says its always \"normal\"  Pulse: NA  Weight: 147 #    Phone call duration: 12 minutes    MCKAY HIGGINS MD    HPI and Plan:   See dictation    Orders Placed This Encounter   Procedures     Basic metabolic panel     Follow-Up with Cardiologist     EKG 12-lead complete w/read - Clinics (to be scheduled)     No orders of the defined types were placed in this encounter.    There are no discontinued medications.      Encounter Diagnoses   Name Primary?     Paroxysmal atrial fibrillation (H) Yes     Benign essential hypertension  "     Tobacco use        CURRENT MEDICATIONS:  Current Outpatient Medications   Medication Sig Dispense Refill     apixaban ANTICOAGULANT (ELIQUIS) 5 MG tablet Take 1 tablet (5 mg) by mouth 2 times daily 180 tablet 3     digoxin (LANOXIN) 125 MCG tablet Take 1 tablet (125 mcg) by mouth daily 90 tablet 1     metoprolol tartrate (LOPRESSOR) 25 MG tablet Take 1 tablet (25 mg) by mouth 2 times daily 180 tablet 3     albuterol (PROVENTIL) (2.5 MG/3ML) 0.083% neb solution Take 1 vial (2.5 mg) by nebulization every 4 hours as needed for wheezing (Patient not taking: Reported on 10/24/2019)       benzocaine-menthol (CHLORASEPTIC) 6-10 MG lozenge Place 1 lozenge inside cheek every hour as needed for sore throat (Patient not taking: Reported on 10/24/2019)         ALLERGIES     Allergies   Allergen Reactions     No Known Drug Allergies        PAST MEDICAL HISTORY:  Past Medical History:   Diagnosis Date     Unspecified hemorrhoids without mention of complication     Hemorrhoids     Unspecified nonsenile cataract        PAST SURGICAL HISTORY:  Past Surgical History:   Procedure Laterality Date     C TOTAL HIP ARTHROPLASTY  01/26/10    Right     HC REMV CATARACT EXTRACAP,INSERT LENS  2/17/2004    Right     HC REMV CATARACT EXTRACAP,INSERT LENS  3/16/2004    Left       FAMILY HISTORY:  Family History   Problem Relation Age of Onset     Diabetes Brother         x2 brothers     Cancer Mother         lung cancer       SOCIAL HISTORY:  Social History     Socioeconomic History     Marital status:      Spouse name: Not on file     Number of children: 6     Years of education: 10     Highest education level: Not on file   Occupational History     Occupation: dish washing     Comment: [employer]   Social Needs     Financial resource strain: Not on file     Food insecurity     Worry: Not on file     Inability: Not on file     Transportation needs     Medical: Not on file     Non-medical: Not on file   Tobacco Use      Smoking status: Former Smoker     Packs/day: 0.50     Years: 40.00     Pack years: 20.00     Types: Cigarettes     Smokeless tobacco: Never Used   Substance and Sexual Activity     Alcohol use: Never     Frequency: Never     Comment: occ wine     Drug use: No     Sexual activity: Not Currently   Lifestyle     Physical activity     Days per week: Not on file     Minutes per session: Not on file     Stress: Not on file   Relationships     Social connections     Talks on phone: Not on file     Gets together: Not on file     Attends Mormonism service: Not on file     Active member of club or organization: Not on file     Attends meetings of clubs or organizations: Not on file     Relationship status: Not on file     Intimate partner violence     Fear of current or ex partner: Not on file     Emotionally abused: Not on file     Physically abused: Not on file     Forced sexual activity: Not on file   Other Topics Concern      Service No     Blood Transfusions No     Caffeine Concern No     Comment: coffee  10 cups/day     Occupational Exposure Yes     Comment: hot water at work     Hobby Hazards No     Sleep Concern No     Stress Concern No     Weight Concern No     Special Diet Yes     Comment: lo fat diet     Back Care No     Exercise Yes     Comment: walking at work      Bike Helmet No     Seat Belt Yes     Self-Exams Yes     Parent/sibling w/ CABG, MI or angioplasty before 65F 55M? Not Asked   Social History Narrative     Not on file       Physical Exam:  Vitals: LMP  (LMP Unknown)     Constitutional:           Skin:             Head:           Eyes:           Lymph:      ENT:           Neck:           Respiratory:            Cardiac:                                                           GI:           Extremities and Muscular Skeletal:                 Neurological:           Psych:         Recent Lab Results:  LIPID RESULTS:  Lab Results   Component Value Date    CHOL 189 05/14/2015    HDL 57 05/14/2015      05/14/2015    TRIG 120 05/14/2015    CHOLHDLRATIO 3.3 05/14/2015       LIVER ENZYME RESULTS:  Lab Results   Component Value Date    AST 36 04/02/2019    ALT 22 04/02/2019       CBC RESULTS:  Lab Results   Component Value Date    WBC 5.0 04/05/2019    RBC 3.74 (L) 04/05/2019    HGB 12.3 04/09/2019    HCT 31.5 (L) 04/05/2019    MCV 84 04/05/2019    MCH 27.3 04/05/2019    MCHC 32.4 04/05/2019    RDW 14.0 04/05/2019     04/09/2019       BMP RESULTS:  Lab Results   Component Value Date     09/15/2020    POTASSIUM 4.3 09/15/2020    CHLORIDE 108 09/15/2020    CO2 30 09/15/2020    ANIONGAP 2 (L) 09/15/2020     (H) 09/15/2020    BUN 13 09/15/2020    CR 0.88 09/15/2020    GFRESTIMATED 59 (L) 09/15/2020    GFRESTBLACK 68 09/15/2020    TONY 8.9 09/15/2020        A1C RESULTS:  No results found for: A1C    INR RESULTS:  Lab Results   Component Value Date    INR 1.14 03/30/2019    INR 1.04 01/29/2010       Service Date: 09/17/2020      TELEPHONE VISIT      HISTORY OF PRESENT ILLNESS:  I had the opportunity to visit with Ms. Gustavo Palmer in Cardiology Clinic today by telephone visit for reevaluation of paroxysmal atrial fibrillation.        As you recall, she is an 87-year-old woman who has had a couple of episodes of atrial fibrillation with rapid ventricular response.  These have converted spontaneously back to sinus rhythm.  She was unaware of the presence of atrial fibrillation when it was occurring and these issues were discovered incidentally.  She was initially hesitant to start anticoagulation, but after her second episode, she has been taking it consistently now for about a year.  She denies any bleeding problems as a result of that.      This year she had an echocardiogram done on 09/15/2020 which demonstrated normal left ventricular size and function with mild valvular regurgitation of all of her 4 valves.  She was in sinus rhythm at the time of that study, but had occasional PVCs.  Her blood  pressure has consistently been under good control in the past, although it was a little high at the time of the echo.      She denies any complaints.  She feels good.  She has no shortness of breath, chest discomfort, lightheadedness, palpitations or syncope.      IMPRESSIONS:  Ms. Gustavo Nielsen is an 87-year-old woman with hypertension, paroxysmal atrial fibrillation and ongoing tobacco use.  She seems to be doing well and maintaining sinus rhythm currently, although I do not know how much atrial fibrillation she has from time to time since she has been asymptomatic with her episodes in the past.  She is continuing on Eliquis for stroke prevention as well as metoprolol and digoxin for rate control.  I am pleased that she is doing well and will not recommend any new medication changes at this time.  I did encourage her to stop smoking once again.      I will plan to have her follow up with me in Cardiology Clinic in 1 year for reevaluation.      cc:      KRISTEN [place] [address]         MCKAY HIGGINS MD, [facility]             D: 2020   T: 2020   MT:       Name:     GUSTAVO NIELSEN   MRN:      [id number]        Account:      [id number]   :      06/15/1933           Service Date: 2020      Document: [id number]         Thank you for allowing me to participate in the care of your patient.    Sincerely,     MCKAY HIGGINS MD     [facility]

## 2020-09-17 NOTE — PROGRESS NOTES
Service Date: 09/17/2020      TELEPHONE VISIT      HISTORY OF PRESENT ILLNESS:  I had the opportunity to visit with Ms. Gustavo Palmer in Cardiology Clinic today by telephone visit for reevaluation of paroxysmal atrial fibrillation.        As you recall, she is an 87-year-old woman who has had a couple of episodes of atrial fibrillation with rapid ventricular response.  These have converted spontaneously back to sinus rhythm.  She was unaware of the presence of atrial fibrillation when it was occurring and these issues were discovered incidentally.  She was initially hesitant to start anticoagulation, but after her second episode, she has been taking it consistently now for about a year.  She denies any bleeding problems as a result of that.      This year she had an echocardiogram done on 09/15/2020 which demonstrated normal left ventricular size and function with mild valvular regurgitation of all of her 4 valves.  She was in sinus rhythm at the time of that study, but had occasional PVCs.  Her blood pressure has consistently been under good control in the past, although it was a little high at the time of the echo.      She denies any complaints.  She feels good.  She has no shortness of breath, chest discomfort, lightheadedness, palpitations or syncope.      IMPRESSIONS:  Ms. Gustavo Palmer is an 87-year-old woman with hypertension, paroxysmal atrial fibrillation and ongoing tobacco use.  She seems to be doing well and maintaining sinus rhythm currently, although I do not know how much atrial fibrillation she has from time to time since she has been asymptomatic with her episodes in the past.  She is continuing on Eliquis for stroke prevention as well as metoprolol and digoxin for rate control.  I am pleased that she is doing well and will not recommend any new medication changes at this time.  I did encourage her to stop smoking once again.      I will plan to have her follow up with me in Cardiology Clinic in 1  year for reevaluation.      cc:      KRISTEN Keating, CNP   [facility]   [address] [place] [address]         MCKAY [facility]IGGINS MD, [facility]             D: 2020   T: 2020   MT: [id number]      Name:     RAYO NIELSEN   MRN:      [id number]        Account:      [id number]   :      06/15/1933           Service Date: 2020      Document: [id number]

## 2020-09-17 NOTE — PROGRESS NOTES
"  The patient has been notified of following:     \"This telephone visit will be conducted via a call between you and your physician/provider. We have found that certain health care needs can be provided without the need for a physical exam.  This service lets us provide the care you need with a short phone conversation.  If a prescription is necessary we can send it directly to your pharmacy.  If lab work is needed we can place an order for that and you can then stop by our lab to have the test done at a later time.    Telephone visits are billed at different rates depending on your insurance coverage. During this emergency period, for some insurers they may be billed the same as an in-person visit.  Please reach out to your insurance provider with any questions.    If during the course of the call the physician/provider feels a telephone visit is not appropriate, you will not be charged for this service.\"    Patient has given verbal consent for Telephone visit?  Yes    What phone number would you like to be contacted at? [phone]    How would you like to obtain your AVS? Mail a copy    Blood pressure: has a neighbor that is a nurse and has her check it occasionally - per patient says its always \"normal\"  Pulse: NA  Weight: 147 #    Phone call duration: 12 minutes    MCKAY HIGGINS MD    HPI and Plan:   See dictation    Orders Placed This Encounter   Procedures     Basic metabolic panel     Follow-Up with Cardiologist     EKG 12-lead complete w/read - Clinics (to be scheduled)     No orders of the defined types were placed in this encounter.    There are no discontinued medications.      Encounter Diagnoses   Name Primary?     Paroxysmal atrial fibrillation (H) Yes     Benign essential hypertension      Tobacco use        CURRENT MEDICATIONS:  Current Outpatient Medications   Medication Sig Dispense Refill     apixaban ANTICOAGULANT (ELIQUIS) 5 MG tablet Take 1 tablet (5 mg) by mouth 2 times daily 180 tablet " 3     digoxin (LANOXIN) 125 MCG tablet Take 1 tablet (125 mcg) by mouth daily 90 tablet 1     metoprolol tartrate (LOPRESSOR) 25 MG tablet Take 1 tablet (25 mg) by mouth 2 times daily 180 tablet 3     albuterol (PROVENTIL) (2.5 MG/3ML) 0.083% neb solution Take 1 vial (2.5 mg) by nebulization every 4 hours as needed for wheezing (Patient not taking: Reported on 10/24/2019)       benzocaine-menthol (CHLORASEPTIC) 6-10 MG lozenge Place 1 lozenge inside cheek every hour as needed for sore throat (Patient not taking: Reported on 10/24/2019)         ALLERGIES     Allergies   Allergen Reactions     No Known Drug Allergies        PAST MEDICAL HISTORY:  Past Medical History:   Diagnosis Date     Unspecified hemorrhoids without mention of complication     Hemorrhoids     Unspecified nonsenile cataract        PAST SURGICAL HISTORY:  Past Surgical History:   Procedure Laterality Date     C TOTAL HIP ARTHROPLASTY  01/26/10    Right     HC REMV CATARACT EXTRACAP,INSERT LENS  2/17/2004    Right     HC REMV CATARACT EXTRACAP,INSERT LENS  3/16/2004    Left       FAMILY HISTORY:  Family History   Problem Relation Age of Onset     Diabetes Brother         x2 brothers     Cancer Mother         lung cancer       SOCIAL HISTORY:  Social History     Socioeconomic History     Marital status:      Spouse name: Not on file     Number of children: 6     Years of education: 10     Highest education level: Not on file   Occupational History     Occupation: dish washing     Comment: [employer]   Social Needs     Financial resource strain: Not on file     Food insecurity     Worry: Not on file     Inability: Not on file     Transportation needs     Medical: Not on file     Non-medical: Not on file   Tobacco Use     Smoking status: Former Smoker     Packs/day: 0.50     Years: 40.00     Pack years: 20.00     Types: Cigarettes     Smokeless tobacco: Never Used   Substance and Sexual Activity     Alcohol use: Never     Frequency:  Never     Comment: occ wine     Drug use: No     Sexual activity: Not Currently   Lifestyle     Physical activity     Days per week: Not on file     Minutes per session: Not on file     Stress: Not on file   Relationships     Social connections     Talks on phone: Not on file     Gets together: Not on file     Attends Religion service: Not on file     Active member of club or organization: Not on file     Attends meetings of clubs or organizations: Not on file     Relationship status: Not on file     Intimate partner violence     Fear of current or ex partner: Not on file     Emotionally abused: Not on file     Physically abused: Not on file     Forced sexual activity: Not on file   Other Topics Concern      Service No     Blood Transfusions No     Caffeine Concern No     Comment: coffee  10 cups/day     Occupational Exposure Yes     Comment: hot water at work     Hobby Hazards No     Sleep Concern No     Stress Concern No     Weight Concern No     Special Diet Yes     Comment: lo fat diet     Back Care No     Exercise Yes     Comment: walking at work      Bike Helmet No     Seat Belt Yes     Self-Exams Yes     Parent/sibling w/ CABG, MI or angioplasty before 65F 55M? Not Asked   Social History Narrative     Not on file       Physical Exam:  Vitals: LMP  (LMP Unknown)     Constitutional:           Skin:             Head:           Eyes:           Lymph:      ENT:           Neck:           Respiratory:            Cardiac:                                                           GI:           Extremities and Muscular Skeletal:                 Neurological:           Psych:         Recent Lab Results:  LIPID RESULTS:  Lab Results   Component Value Date    CHOL 189 05/14/2015    HDL 57 05/14/2015     05/14/2015    TRIG 120 05/14/2015    CHOLHDLRATIO 3.3 05/14/2015       LIVER ENZYME RESULTS:  Lab Results   Component Value Date    AST 36 04/02/2019    ALT 22 04/02/2019       CBC RESULTS:  Lab Results    Component Value Date    WBC 5.0 04/05/2019    RBC 3.74 (L) 04/05/2019    HGB 12.3 04/09/2019    HCT 31.5 (L) 04/05/2019    MCV 84 04/05/2019    MCH 27.3 04/05/2019    MCHC 32.4 04/05/2019    RDW 14.0 04/05/2019     04/09/2019       BMP RESULTS:  Lab Results   Component Value Date     09/15/2020    POTASSIUM 4.3 09/15/2020    CHLORIDE 108 09/15/2020    CO2 30 09/15/2020    ANIONGAP 2 (L) 09/15/2020     (H) 09/15/2020    BUN 13 09/15/2020    CR 0.88 09/15/2020    GFRESTIMATED 59 (L) 09/15/2020    GFRESTBLACK 68 09/15/2020    TONY 8.9 09/15/2020        A1C RESULTS:  No results found for: A1C    INR RESULTS:  Lab Results   Component Value Date    INR 1.14 03/30/2019    INR 1.04 01/29/2010           CC  Brice Jama MD  [address]  [place] [id number]

## 2021-03-26 ENCOUNTER — TELEPHONE (OUTPATIENT)
Dept: CARDIOLOGY | Facility: CLINIC | Age: 86
End: 2021-03-26
Payer: COMMERCIAL

## 2021-03-26 DIAGNOSIS — I48.91 ATRIAL FIBRILLATION WITH RVR (H): ICD-10-CM

## 2021-03-26 RX ORDER — DIGOXIN 125 MCG
125 TABLET ORAL DAILY
Qty: 90 TABLET | Refills: 1 | Status: SHIPPED | OUTPATIENT
Start: 2021-03-26 | End: 2021-09-27

## 2021-03-26 NOTE — TELEPHONE ENCOUNTER
Reason for Call:  Other appointment    Detailed comments: Patient calling to get a refill on her digoxin. Uses [facility].     Phone Number Patient can be reached at: Home number on file [phone] (home)    Best Time: anyu    Can we leave a detailed message on this number? YES    Call taken on 3/26/2021 at 11:35 AM by Luann Choe

## 2021-03-26 NOTE — TELEPHONE ENCOUNTER
Digoxin refilled. Pt has not had a dig level since 4/2019. Pt due for follow up with  9/2021.I will message  to see if ok for pt to have dig level when he sees her next, or if he wants her to have one now. Artie SCHAFFER March 26, 2021, 12:09 PM

## 2021-09-27 DIAGNOSIS — I48.0 PAROXYSMAL ATRIAL FIBRILLATION (H): ICD-10-CM

## 2021-09-27 DIAGNOSIS — I48.91 ATRIAL FIBRILLATION WITH RVR (H): ICD-10-CM

## 2021-09-27 RX ORDER — DIGOXIN 125 MCG
125 TABLET ORAL DAILY
Qty: 90 TABLET | Refills: 0 | Status: SHIPPED | OUTPATIENT
Start: 2021-09-27 | End: 2021-09-28

## 2021-09-28 ENCOUNTER — HOSPITAL ENCOUNTER (OUTPATIENT)
Dept: CARDIOLOGY | Facility: CLINIC | Age: 86
Discharge: HOME | End: 2021-09-28
Attending: INTERNAL MEDICINE | Admitting: INTERNAL MEDICINE
Payer: COMMERCIAL

## 2021-09-28 ENCOUNTER — OFFICE VISIT (OUTPATIENT)
Dept: CARDIOLOGY | Facility: CLINIC | Age: 86
End: 2021-09-28
Payer: COMMERCIAL

## 2021-09-28 VITALS
HEART RATE: 60 BPM | HEIGHT: 63 IN | BODY MASS INDEX: 24.95 KG/M2 | OXYGEN SATURATION: 97 % | WEIGHT: 140.8 LBS | SYSTOLIC BLOOD PRESSURE: 138 MMHG | DIASTOLIC BLOOD PRESSURE: 78 MMHG

## 2021-09-28 DIAGNOSIS — Z72.0 TOBACCO USE: ICD-10-CM

## 2021-09-28 DIAGNOSIS — I48.0 PAROXYSMAL ATRIAL FIBRILLATION (H): Primary | ICD-10-CM

## 2021-09-28 DIAGNOSIS — I10 BENIGN ESSENTIAL HYPERTENSION: ICD-10-CM

## 2021-09-28 PROCEDURE — 93005 ELECTROCARDIOGRAM TRACING: CPT | Performed by: REHABILITATION PRACTITIONER

## 2021-09-28 PROCEDURE — 93010 ELECTROCARDIOGRAM REPORT: CPT | Performed by: INTERNAL MEDICINE

## 2021-09-28 PROCEDURE — 99214 OFFICE O/P EST MOD 30 MIN: CPT | Performed by: INTERNAL MEDICINE

## 2021-09-28 RX ORDER — DIGOXIN 125 MCG
125 TABLET ORAL DAILY
Qty: 90 TABLET | Refills: 3 | Status: CANCELLED | OUTPATIENT
Start: 2021-09-28

## 2021-09-28 RX ORDER — METOPROLOL TARTRATE 25 MG/1
25 TABLET, FILM COATED ORAL 2 TIMES DAILY
Qty: 180 TABLET | Refills: 3 | Status: SHIPPED | OUTPATIENT
Start: 2021-09-28 | End: 2022-07-05

## 2021-09-28 ASSESSMENT — MIFFLIN-ST. JEOR: SCORE: 1037.79

## 2021-09-28 NOTE — PROGRESS NOTES
HPI and Plan:   See dictation    Orders Placed This Encounter   Procedures     Follow-Up with Cardiologist     EKG 12-LEAD CLINIC READ       Orders Placed This Encounter   Medications     apixaban ANTICOAGULANT (ELIQUIS) 5 MG tablet     Sig: Take 1 tablet (5 mg) by mouth 2 times daily     Dispense:  180 tablet     Refill:  3     metoprolol tartrate (LOPRESSOR) 25 MG tablet     Sig: Take 1 tablet (25 mg) by mouth 2 times daily     Dispense:  180 tablet     Refill:  3       Medications Discontinued During This Encounter   Medication Reason     digoxin (LANOXIN) 125 MCG tablet      metoprolol tartrate (LOPRESSOR) 25 MG tablet Reorder     apixaban ANTICOAGULANT (ELIQUIS) 5 MG tablet Reorder         Encounter Diagnoses   Name Primary?     Paroxysmal atrial fibrillation (H) Yes     Benign essential hypertension      Tobacco use        CURRENT MEDICATIONS:  Current Outpatient Medications   Medication Sig Dispense Refill     apixaban ANTICOAGULANT (ELIQUIS) 5 MG tablet Take 1 tablet (5 mg) by mouth 2 times daily 180 tablet 3     metoprolol tartrate (LOPRESSOR) 25 MG tablet Take 1 tablet (25 mg) by mouth 2 times daily 180 tablet 3     albuterol (PROVENTIL) (2.5 MG/3ML) 0.083% neb solution Take 1 vial (2.5 mg) by nebulization every 4 hours as needed for wheezing (Patient not taking: Reported on 10/24/2019)       benzocaine-menthol (CHLORASEPTIC) 6-10 MG lozenge Place 1 lozenge inside cheek every hour as needed for sore throat (Patient not taking: Reported on 10/24/2019)         ALLERGIES     Allergies   Allergen Reactions     No Known Drug Allergies        PAST MEDICAL HISTORY:  Past Medical History:   Diagnosis Date     Unspecified hemorrhoids without mention of complication     Hemorrhoids     Unspecified nonsenile cataract        PAST SURGICAL HISTORY:  Past Surgical History:   Procedure Laterality Date     C TOTAL HIP ARTHROPLASTY  01/26/10    Right     HC REMV CATARACT EXTRACAP,INSERT LENS  2/17/2004    Right     HC REMV  CATARACT EXTRACAP,INSERT LENS  3/16/2004    Left       FAMILY HISTORY:  Family History   Problem Relation Age of Onset     Diabetes Brother         x2 brothers     Cancer Mother         lung cancer       SOCIAL HISTORY:  Social History     Socioeconomic History     Marital status:      Spouse name: Not on file     Number of children: 6     Years of education: 10     Highest education level: Not on file   Occupational History     Occupation: dish washing     Comment: [employer]   Tobacco Use     Smoking status: Former Smoker     Packs/day: 0.50     Years: 40.00     Pack years: 20.00     Types: Cigarettes     Smokeless tobacco: Never Used   Substance and Sexual Activity     Alcohol use: Never     Comment: occ wine     Drug use: No     Sexual activity: Not Currently   Other Topics Concern      Service No     Blood Transfusions No     Caffeine Concern No     Comment: coffee  10 cups/day     Occupational Exposure Yes     Comment: hot water at work     Hobby Hazards No     Sleep Concern No     Stress Concern No     Weight Concern No     Special Diet Yes     Comment: lo fat diet     Back Care No     Exercise Yes     Comment: walking at work      Bike Helmet No     Seat Belt Yes     Self-Exams Yes     Parent/sibling w/ CABG, MI or angioplasty before 65F 55M? Not Asked   Social History Narrative     Not on file     Social Determinants of Health     Financial Resource Strain:      Difficulty of Paying Living Expenses:    Food Insecurity:      Worried About Running Out of Food in the Last Year:      Ran Out of Food in the Last Year:    Transportation Needs:      Lack of Transportation (Medical):      Lack of Transportation (Non-Medical):    Physical Activity:      Days of Exercise per Week:      Minutes of Exercise per Session:    Stress:      Feeling of Stress :    Social Connections:      Frequency of Communication with Friends and Family:      Frequency of Social Gatherings with Friends and  "Family:      Attends Advent Services:      Active Member of Clubs or Organizations:      Attends Club or Organization Meetings:      Marital Status:    Intimate Partner Violence:      Fear of Current or Ex-Partner:      Emotionally Abused:      Physically Abused:      Sexually Abused:        Review of Systems:  Skin:  Negative       Eyes:  Positive for glasses    ENT:  Negative      Respiratory:  Positive for dyspnea on exertion has inhaler if needed   Cardiovascular:  Negative for;palpitations;chest pain;edema;lightheadedness;dizziness      Gastroenterology: Negative      Genitourinary:  Negative      Musculoskeletal:  Negative      Neurologic:  Negative      Psychiatric:  Negative      Heme/Lymph/Imm:  Negative      Endocrine:  Negative        Physical Exam:  Vitals: /78   Pulse 60   Ht 1.6 m (5' 3\")   Wt 63.9 kg (140 lb 12.8 oz)   LMP  (LMP Unknown)   SpO2 97%   BMI 24.94 kg/m      Constitutional:  cooperative, alert and oriented, well developed, well nourished, in no acute distress        Skin:  warm and dry to the touch, no apparent skin lesions or masses noted          Head:  normocephalic, no masses or lesions        Eyes:  pupils equal and round, conjunctivae and lids unremarkable, sclera white, no xanthalasma, EOMS intact, no nystagmus        Lymph:No Cervical lymphadenopathy present     ENT:  no pallor or cyanosis        Neck:  carotid pulses are full and equal bilaterally, JVP normal, no carotid bruit        Respiratory:  normal breath sounds, clear to auscultation, normal A-P diameter, normal symmetry, normal respiratory excursion, no use of accessory muscles         Cardiac: regular rhythm, normal S1/S2, no S3 or S4, apical impulse not displaced, no murmurs, gallops or rubs                                                         GI:  abdomen soft;BS normoactive        Extremities and Muscular Skeletal:  no deformities, clubbing, cyanosis, erythema observed;no edema          "     Neurological:  no gross motor deficits;affect appropriate        Psych:  oriented to time, person and place        CC  No referring provider defined for this encounter.

## 2021-09-28 NOTE — LETTER
9/28/2021    Radiology [facility] Provide  No address on file    RE: Gustavo Palmer       Dear Colleague,    I had the pleasure of seeing Gustavo Palmer in the [facility].    HPI and Plan:   See dictation    Orders Placed This Encounter   Procedures     Follow-Up with Cardiologist     EKG 12-LEAD CLINIC READ       Orders Placed This Encounter   Medications     apixaban ANTICOAGULANT (ELIQUIS) 5 MG tablet     Sig: Take 1 tablet (5 mg) by mouth 2 times daily     Dispense:  180 tablet     Refill:  3     metoprolol tartrate (LOPRESSOR) 25 MG tablet     Sig: Take 1 tablet (25 mg) by mouth 2 times daily     Dispense:  180 tablet     Refill:  3       Medications Discontinued During This Encounter   Medication Reason     digoxin (LANOXIN) 125 MCG tablet      metoprolol tartrate (LOPRESSOR) 25 MG tablet Reorder     apixaban ANTICOAGULANT (ELIQUIS) 5 MG tablet Reorder         Encounter Diagnoses   Name Primary?     Paroxysmal atrial fibrillation (H) Yes     Benign essential hypertension      Tobacco use        CURRENT MEDICATIONS:  Current Outpatient Medications   Medication Sig Dispense Refill     apixaban ANTICOAGULANT (ELIQUIS) 5 MG tablet Take 1 tablet (5 mg) by mouth 2 times daily 180 tablet 3     metoprolol tartrate (LOPRESSOR) 25 MG tablet Take 1 tablet (25 mg) by mouth 2 times daily 180 tablet 3     albuterol (PROVENTIL) (2.5 MG/3ML) 0.083% neb solution Take 1 vial (2.5 mg) by nebulization every 4 hours as needed for wheezing (Patient not taking: Reported on 10/24/2019)       benzocaine-menthol (CHLORASEPTIC) 6-10 MG lozenge Place 1 lozenge inside cheek every hour as needed for sore throat (Patient not taking: Reported on 10/24/2019)         ALLERGIES     Allergies   Allergen Reactions     No Known Drug Allergies        PAST MEDICAL HISTORY:  Past Medical History:   Diagnosis Date     Unspecified hemorrhoids without mention of complication     Hemorrhoids      Unspecified nonsenile cataract        PAST SURGICAL HISTORY:  Past Surgical History:   Procedure Laterality Date     C TOTAL HIP ARTHROPLASTY  01/26/10    Right     HC REMV CATARACT EXTRACAP,INSERT LENS  2/17/2004    Right     HC REMV CATARACT EXTRACAP,INSERT LENS  3/16/2004    Left       FAMILY HISTORY:  Family History   Problem Relation Age of Onset     Diabetes Brother         x2 brothers     Cancer Mother         lung cancer       SOCIAL HISTORY:  Social History     Socioeconomic History     Marital status:      Spouse name: Not on file     Number of children: 6     Years of education: 10     Highest education level: Not on file   Occupational History     Occupation: dish washing     Comment: [employer]   Tobacco Use     Smoking status: Former Smoker     Packs/day: 0.50     Years: 40.00     Pack years: 20.00     Types: Cigarettes     Smokeless tobacco: Never Used   Substance and Sexual Activity     Alcohol use: Never     Comment: occ wine     Drug use: No     Sexual activity: Not Currently   Other Topics Concern      Service No     Blood Transfusions No     Caffeine Concern No     Comment: coffee  10 cups/day     Occupational Exposure Yes     Comment: hot water at work     Hobby Hazards No     Sleep Concern No     Stress Concern No     Weight Concern No     Special Diet Yes     Comment: lo fat diet     Back Care No     Exercise Yes     Comment: walking at work      Bike Helmet No     Seat Belt Yes     Self-Exams Yes     Parent/sibling w/ CABG, MI or angioplasty before 65F 55M? Not Asked   Social History Narrative     Not on file     Social Determinants of Health     Financial Resource Strain:      Difficulty of Paying Living Expenses:    Food Insecurity:      Worried About Running Out of Food in the Last Year:      Ran Out of Food in the Last Year:    Transportation Needs:      Lack of Transportation (Medical):      Lack of Transportation (Non-Medical):    Physical Activity:       "Days of Exercise per Week:      Minutes of Exercise per Session:    Stress:      Feeling of Stress :    Social Connections:      Frequency of Communication with Friends and Family:      Frequency of Social Gatherings with Friends and Family:      Attends Jain Services:      Active Member of Clubs or Organizations:      Attends Club or Organization Meetings:      Marital Status:    Intimate Partner Violence:      Fear of Current or Ex-Partner:      Emotionally Abused:      Physically Abused:      Sexually Abused:        Review of Systems:  Skin:  Negative       Eyes:  Positive for glasses    ENT:  Negative      Respiratory:  Positive for dyspnea on exertion has inhaler if needed   Cardiovascular:  Negative for;palpitations;chest pain;edema;lightheadedness;dizziness      Gastroenterology: Negative      Genitourinary:  Negative      Musculoskeletal:  Negative      Neurologic:  Negative      Psychiatric:  Negative      Heme/Lymph/Imm:  Negative      Endocrine:  Negative        Physical Exam:  Vitals: /78   Pulse 60   Ht 1.6 m (5' 3\")   Wt 63.9 kg (140 lb 12.8 oz)   LMP  (LMP Unknown)   SpO2 97%   BMI 24.94 kg/m      Constitutional:  cooperative, alert and oriented, well developed, well nourished, in no acute distress        Skin:  warm and dry to the touch, no apparent skin lesions or masses noted          Head:  normocephalic, no masses or lesions        Eyes:  pupils equal and round, conjunctivae and lids unremarkable, sclera white, no xanthalasma, EOMS intact, no nystagmus        Lymph:No Cervical lymphadenopathy present     ENT:  no pallor or cyanosis        Neck:  carotid pulses are full and equal bilaterally, JVP normal, no carotid bruit        Respiratory:  normal breath sounds, clear to auscultation, normal A-P diameter, normal symmetry, normal respiratory excursion, no use of accessory muscles         Cardiac: regular rhythm, normal S1/S2, no S3 or S4, apical impulse not displaced, no murmurs, " gallops or rubs                                                         GI:  abdomen soft;BS normoactive        Extremities and Muscular Skeletal:  no deformities, clubbing, cyanosis, erythema observed;no edema              Neurological:  no gross motor deficits;affect appropriate        Psych:  oriented to time, person and place        CC  No referring provider defined for this encounter.                  Thank you for allowing me to participate in the care of your patient.      Sincerely,     MCKAY HIGGINS MD     [facility]  cc:   No referring provider defined for this encounter.

## 2021-09-28 NOTE — PROGRESS NOTES
Service Date: 09/28/2021    PRIMARY CARE PROVIDER:  None listed.    HISTORY OF PRESENT ILLNESS:  I had the opportunity to see Ms. Gustavo Palmer in Cardiology Clinic today at [facility] in [place] for reevaluation of paroxysmal atrial fibrillation and hypertension.  Ms. Palmer was diagnosed with atrial fibrillation with rapid ventricular response when she was hospitalized with a COPD exacerbation in the past.  These episodes of atrial fibrillation have converted spontaneously back to sinus rhythm, and she has not been aware of the arrhythmia.  Based on her high risk of recurrence and stroke, I recommended that she start anticoagulation with Eliquis, and she has been on that consistently without significant bleeding problems.    She has not felt any recurrences of her atrial fibrillation since I saw her last year.  She occasionally feels her chest, but believes that her rhythm is normal.  Her echocardiogram in the past was essentially normal.    She takes metoprolol for heart rate control and management of hypertension, and her blood pressures at home have been consistently in the 130s to 120s systolic.  She takes it twice a day.    She admits to having some shortness of breath with exertion and has to stop after about 4-5 blocks to use her inhaler and catch her breath.  Otherwise, she is not having any other concerning cardiac symptoms such as chest discomfort, palpitations, lightheadedness or syncope.    PHYSICAL EXAMINATION:  On examination here today, her blood pressure was 138/78, heart rate 60 and weight 140 pounds.  Her lungs are clear.  Heart rhythm is regular.  I do not hear any cardiac murmurs.    We did an ECG today, which I reviewed personally.  It demonstrates sinus bradycardia without other significant abnormalities.    IMPRESSIONS:  Ms. Gustavo Palmer is an 88-year-old woman with hypertension, COPD, and paroxysmal atrial fibrillation.  She remains on Eliquis for stroke prevention and  has good control of her hypertension at home.  She has had about a 25-pound weight loss over the last 2 years, and I recommended that she have followup evaluation for this.  She is a long time smoker but has cut down quite a bit and is now down to 4 cigarettes a day.  I encouraged her to quit altogether.    Her blood pressure is generally under good control, and we are not seeing significant recurrences of atrial fibrillation at this point.  She is remaining on Eliquis for stroke prevention without bleeding problems.    I encouraged her to follow up with me again in 1 year for reevaluation of these issues.    I spent 25 minutes with her today, reviewing her ECG, laboratory studies and symptoms.    Brice Jama MD, [facility]        D: 2021   T: 2021   MT: ze    Name:     RAYO NIELSEN  MRN:      [id number]        Account:      [id number]   :      06/15/1933           Service Date: 2021       Document: [id number]

## 2022-03-09 ENCOUNTER — TELEPHONE (OUTPATIENT)
Dept: CARDIOLOGY | Facility: CLINIC | Age: 87
End: 2022-03-09
Payer: COMMERCIAL

## 2022-03-09 NOTE — TELEPHONE ENCOUNTER
Reason for Call:  Other prescription    Detailed comments: pt calling wondering if  can send in a prescription for an inhaler for her. Please call pt back once completed or with any questions. Thank you    Phone Number Patient can be reached at: Home number on file [phone] (home)    Best Time: any    Can we leave a detailed message on this number? YES    Call taken on 3/9/2022 at 3:45 PM by Kylah Daley

## 2022-05-31 ENCOUNTER — TRANSFERRED RECORDS (OUTPATIENT)
Dept: HEALTH INFORMATION MANAGEMENT | Facility: CLINIC | Age: 87
End: 2022-05-31
Payer: COMMERCIAL

## 2022-06-02 ENCOUNTER — TRANSFERRED RECORDS (OUTPATIENT)
Dept: HEALTH INFORMATION MANAGEMENT | Facility: CLINIC | Age: 87
End: 2022-06-02
Payer: COMMERCIAL

## 2022-06-02 LAB — EJECTION FRACTION: 55 %

## 2022-06-03 ENCOUNTER — TRANSFERRED RECORDS (OUTPATIENT)
Dept: HEALTH INFORMATION MANAGEMENT | Facility: CLINIC | Age: 87
End: 2022-06-03
Payer: COMMERCIAL

## 2022-06-08 ENCOUNTER — TRANSFERRED RECORDS (OUTPATIENT)
Dept: HEALTH INFORMATION MANAGEMENT | Facility: CLINIC | Age: 87
End: 2022-06-08
Payer: COMMERCIAL

## 2022-06-08 ENCOUNTER — TELEPHONE (OUTPATIENT)
Dept: CARDIOLOGY | Facility: CLINIC | Age: 87
End: 2022-06-08
Payer: COMMERCIAL

## 2022-06-08 NOTE — TELEPHONE ENCOUNTER
[facility] called and asked us to please call pt to setup for a follow-up.   Lm for pt to call and sched. per [facility] needs fu for afib per Cyndee SCHAFFER pt can see any ABIGAIL needs BMP as well armando 6/8/2022. Thank You Cathryn  Gave discharge papers to Cyndee SCHAFFER

## 2022-06-27 ENCOUNTER — TELEPHONE (OUTPATIENT)
Dept: CARDIOLOGY | Facility: CLINIC | Age: 87
End: 2022-06-27

## 2022-06-27 NOTE — TELEPHONE ENCOUNTER
[facility] Call Center    Phone Message    May a detailed message be left on voicemail: yes     Reason for Call: Other: Gustavo calling to report that her daughter and grandson tested positive for Covid - daughter last week, grandson tested positive today.  They live with her.  She is wondering if she has to reschedule her appointment or if it could over the phone.  Please review and call her back to discuss    Action Taken: Message routed to:  Other: Cardiology    Travel Screening: Not Applicable

## 2022-06-27 NOTE — TELEPHONE ENCOUNTER
Spoke with patient and gave her Katina Lopez NP recommendations as noted below. She is okay with rescheduling cardiology visit. She will have an EKG done at that time. She will let us know if she has any further concerns regarding A Fib. Patient noted she feels good now. Call was transferred to specialty scheduling to reschedule visit.

## 2022-06-27 NOTE — TELEPHONE ENCOUNTER
Please have her reschedule. If she becomes COVID positive and symptomatic, she may go back into atrial fibrillation. Please encourage her to stay hydrated and call if she has concerns of returning to atrial fibrillation. Also, please get EKG at the rescheduled visit.     KRISTEN Mohr, CNP

## 2022-07-05 ENCOUNTER — OFFICE VISIT (OUTPATIENT)
Dept: CARDIOLOGY | Facility: CLINIC | Age: 87
End: 2022-07-05
Payer: COMMERCIAL

## 2022-07-05 VITALS
HEIGHT: 63 IN | WEIGHT: 137 LBS | SYSTOLIC BLOOD PRESSURE: 138 MMHG | HEART RATE: 82 BPM | BODY MASS INDEX: 24.27 KG/M2 | DIASTOLIC BLOOD PRESSURE: 74 MMHG | OXYGEN SATURATION: 96 %

## 2022-07-05 DIAGNOSIS — I48.0 PAROXYSMAL ATRIAL FIBRILLATION (H): Primary | ICD-10-CM

## 2022-07-05 DIAGNOSIS — I10 BENIGN ESSENTIAL HYPERTENSION: ICD-10-CM

## 2022-07-05 LAB
ANION GAP SERPL CALCULATED.3IONS-SCNC: 5 MMOL/L (ref 3–14)
BUN SERPL-MCNC: 14 MG/DL (ref 7–30)
CALCIUM SERPL-MCNC: 8.3 MG/DL (ref 8.5–10.1)
CHLORIDE BLD-SCNC: 113 MMOL/L (ref 94–109)
CO2 SERPL-SCNC: 26 MMOL/L (ref 20–32)
CREAT SERPL-MCNC: 0.84 MG/DL (ref 0.52–1.04)
GFR SERPL CREATININE-BSD FRML MDRD: 66 ML/MIN/1.73M2
GLUCOSE BLD-MCNC: 99 MG/DL (ref 70–99)
POTASSIUM BLD-SCNC: 3.6 MMOL/L (ref 3.4–5.3)
SODIUM SERPL-SCNC: 144 MMOL/L (ref 133–144)

## 2022-07-05 PROCEDURE — 36415 COLL VENOUS BLD VENIPUNCTURE: CPT | Performed by: PHYSICIAN ASSISTANT

## 2022-07-05 PROCEDURE — 93010 ELECTROCARDIOGRAM REPORT: CPT | Performed by: PHYSICIAN ASSISTANT

## 2022-07-05 PROCEDURE — 80048 BASIC METABOLIC PNL TOTAL CA: CPT | Performed by: PHYSICIAN ASSISTANT

## 2022-07-05 PROCEDURE — 99214 OFFICE O/P EST MOD 30 MIN: CPT | Mod: 25 | Performed by: PHYSICIAN ASSISTANT

## 2022-07-05 RX ORDER — DIGOXIN 125 MCG
125 TABLET ORAL DAILY
COMMUNITY
Start: 2022-06-02 | End: 2022-08-18 | Stop reason: ALTCHOICE

## 2022-07-05 RX ORDER — MOMETASONE FUROATE AND FORMOTEROL FUMARATE DIHYDRATE 200; 5 UG/1; UG/1
AEROSOL RESPIRATORY (INHALATION)
COMMUNITY
Start: 2022-03-09

## 2022-07-05 RX ORDER — ASPIRIN 81 MG/1
81 TABLET ORAL DAILY
COMMUNITY
End: 2024-03-28

## 2022-07-05 RX ORDER — LEVOTHYROXINE SODIUM 25 UG/1
TABLET ORAL
COMMUNITY
Start: 2022-06-22

## 2022-07-05 NOTE — LETTER
Date:July 6, 2022      Provider requested that no letter be sent. Do not send.       [facility]

## 2022-07-05 NOTE — PATIENT INSTRUCTIONS
Today's Plan:   6 month follow up (phone or in-person visit)    If you have questions or concerns please call clinic at [phone].    Please call [phone] for scheduling.       It was a pleasure seeing you today!     Reminder: Please bring in all current medications, over the counter supplements and vitamin bottles to your next appointment.    Desirae Dockery PA-C  7/5/2022

## 2022-07-05 NOTE — LETTER
7/5/2022    Radiology  Non-[facility] Provide  No address on file    RE: Gustavo Palmer       Dear Colleague,     I had the pleasure of seeing Gustavo Palmer in the [facility].  Primary Cardiologist: Dr. Jama    Reason for Visit: Hospital follow up    History of Present Illness:   Gustavo is a pleasant 89 year old female with past medical history notable for paroxysmal atrial fibrillation, COPD, and tobacco dependence.    Gustavo follows with Dr. Jama for management of her paroxysmal atrial fibrillation.  She was reluctant to initiate anticoagulation despite her [id number] score of 3 but eventually decided to be on apixaban.  She tells me she cannot tolerate this medication as she developed epistaxis almost every other day and stopped it on her own.  For her atrial fibrillation she was on Metroprolol tartrate 25 mg twice daily as well as digoxin.  Her initial atrial fibrillation RVR episode was in the setting of double pneumonia.    She recently was admitted to a local hospital in her town with recurrent A. fib RVR.  This was in the setting of significant gastroenteritis with watery diarrhea and no food/water intake for 2 to 3 days.  Her pulse was significantly high requiring IV digoxin.  She converted to normal sinus rhythm on her own after IV fluid therapy.  Her echocardiogram was completed which showed preserved biventricular function with no wall motion normalities or valve issues.  She was initiated on baby aspirin as she was reluctant to start anticoagulation and was discharged home with close cardiology follow-up.    Gustavo presents to clinic today by herself, stating she is doing much better.  Her diarrhea has resolved and her appetite is back.  She is not interested in any form of anticoagulation other than baby aspirin due to significant epistaxis.  We have briefly talked about watchman device but she is reluctant with this.  She is aware of the stroke risk as both her spouse and her  son dealt with multiple strokes.  She feels that when it is her time to go she is ready.  She denies shortness of breath, orthopnea, peripheral edema, palpitations, chest discomfort, or recurrent bleeding issues with baby aspirin.  Her hemoglobin was checked during recent admission which showed normal value.    Assessment and Plan:  Gustavo is a very pleasant 89-year-old female with past medical history notable for paroxysmal atrial fibrillation, COPD, tobacco dependence, and recent diagnosis of hypothyroidism.    Gustavo had recent A. fib RVR in the setting of gastroenteritis.  She converted to normal sinus rhythm spontaneously and has been doing well on rate control strategy with low-dose digoxin.    She appears to be doing much better.  Her ECG today shows normal sinus rhythm.  Her blood pressure and pulse are under good rate control.  She is only on digoxin therapy for rate control.  She has lost about 30 pounds of weight over the last few years.  She is adamant to not be on apixaban thus we will continue with aspirin 81 mg daily.  Her RDM7CQ1-SCJe score is 3 and her has bled score is 1.  Her annual stroke risk is 3.6% and annual major bleeding risk of 3.4%.  We have considered switching her from digoxin to metoprolol given her advanced age but she tells me her blood pressure was quite low when she was on metoprolol.  I will have her follow-up with her primary cardiologist in 6 months.    This note was completed in part using Dragon voice recognition software. Although reviewed after completion, some word and grammatical errors may occur.    Orders this Visit:  Orders Placed This Encounter   Procedures     EKG 12-LEAD CLINIC READ     Orders Placed This Encounter   Medications     digoxin (LANOXIN) 125 MCG tablet     Sig: Take 125 mcg by mouth daily     levothyroxine (SYNTHROID/LEVOTHROID) 25 MCG tablet     Sig: take 1 tablet by mouth once daily, you need to have tsh rechecked at the clinic lab in 2 weeks      DULERA 200-5 MCG/ACT inhaler     Sig: inhale 2 puffs using inhaler twice a day     aspirin 81 MG EC tablet     Sig: Take 81 mg by mouth daily     Medications Discontinued During This Encounter   Medication Reason     metoprolol tartrate (LOPRESSOR) 25 MG tablet      apixaban ANTICOAGULANT (ELIQUIS) 5 MG tablet      benzocaine-menthol (CHLORASEPTIC) 6-10 MG lozenge      albuterol (PROVENTIL) (2.5 MG/3ML) 0.083% neb solution          Encounter Diagnoses   Name Primary?     Paroxysmal atrial fibrillation (H) Yes     Benign essential hypertension        CURRENT MEDICATIONS:  Current Outpatient Medications   Medication Sig Dispense Refill     aspirin 81 MG EC tablet Take 81 mg by mouth daily       digoxin (LANOXIN) 125 MCG tablet Take 125 mcg by mouth daily       DULERA 200-5 MCG/ACT inhaler inhale 2 puffs using inhaler twice a day       levothyroxine (SYNTHROID/LEVOTHROID) 25 MCG tablet take 1 tablet by mouth once daily, you need to have tsh rechecked at the clinic lab in 2 weeks         ALLERGIES     Allergies   Allergen Reactions     No Known Drug Allergies        PAST MEDICAL HISTORY:  Past Medical History:   Diagnosis Date     Unspecified hemorrhoids without mention of complication     Hemorrhoids     Unspecified nonsenile cataract        PAST SURGICAL HISTORY:  Past Surgical History:   Procedure Laterality Date     C TOTAL HIP ARTHROPLASTY  01/26/10    Right     HC REMV CATARACT EXTRACAP,INSERT LENS  2/17/2004    Right     HC REMV CATARACT EXTRACAP,INSERT LENS  3/16/2004    Left       FAMILY HISTORY:  Family History   Problem Relation Age of Onset     Diabetes Brother         x2 brothers     Cancer Mother         lung cancer       SOCIAL HISTORY:  Social History     Socioeconomic History     Marital status:      Number of children: 6     Years of education: 10   Occupational History     Occupation: dish washing     Comment: [employer]   Tobacco Use     Smoking status: Former Smoker     Packs/day:  "0.50     Years: 40.00     Pack years: 20.00     Types: Cigarettes     Smokeless tobacco: Never Used   Substance and Sexual Activity     Alcohol use: Never     Comment: occ wine     Drug use: No     Sexual activity: Not Currently   Other Topics Concern      Service No     Blood Transfusions No     Caffeine Concern No     Comment: coffee  10 cups/day     Occupational Exposure Yes     Comment: hot water at work     Hobby Hazards No     Sleep Concern No     Stress Concern No     Weight Concern No     Special Diet Yes     Comment: lo fat diet     Back Care No     Exercise Yes     Comment: walking at work      Bike Helmet No     Seat Belt Yes     Self-Exams Yes       Review of Systems:  Skin:  Negative     Eyes:  Positive for glasses  ENT:  Negative    Respiratory:  Positive for dyspnea on exertion  Cardiovascular:  Negative for;palpitations;chest pain;edema Positive for;lightheadedness;dizziness  Gastroenterology: Negative    Genitourinary:  Negative    Musculoskeletal:  Negative    Neurologic:  Negative    Psychiatric:  Negative    Heme/Lymph/Imm:  Negative    Endocrine:  Negative thyroid disorder    Physical Exam:  Vitals: /74 (BP Location: Right arm, Patient Position: Sitting, Cuff Size: Adult Regular)   Pulse 82   Ht 1.6 m (5' 3\")   Wt 62.1 kg (137 lb)   LMP  (LMP Unknown)   SpO2 96%   BMI 24.27 kg/m       GEN:  NAD  NECK: No JVD  C/V:  Regular rate and rhythm, no murmur, rub or gallop.  RESP: Clear to auscultation bilaterally without wheezing, rales, or rhonchi.  GI: Abdomen soft, nontender, nondistended.   EXTREM: No pitting LE edema.   NEURO: Alert and oriented, cooperative. No obvious focal deficits.   PSYCH: Normal affect.  SKIN: Warm and dry.       Recent Lab Results:  LIPID RESULTS:  Lab Results   Component Value Date    CHOL 189 05/14/2015    HDL 57 05/14/2015     05/14/2015    TRIG 120 05/14/2015    CHOLHDLRATIO 3.3 05/14/2015       LIVER ENZYME RESULTS:  Lab Results   Component " Value Date    AST 36 04/02/2019    ALT 22 04/02/2019       CBC RESULTS:  Lab Results   Component Value Date    WBC 5.0 04/05/2019    RBC 3.74 (L) 04/05/2019    HGB 12.3 04/09/2019    HCT 31.5 (L) 04/05/2019    MCV 84 04/05/2019    MCH 27.3 04/05/2019    MCHC 32.4 04/05/2019    RDW 14.0 04/05/2019     04/09/2019       BMP RESULTS:  Lab Results   Component Value Date     07/05/2022     09/15/2020    POTASSIUM 3.6 07/05/2022    POTASSIUM 4.3 09/15/2020    CHLORIDE 113 (H) 07/05/2022    CHLORIDE 108 09/15/2020    CO2 26 07/05/2022    CO2 30 09/15/2020    ANIONGAP 5 07/05/2022    ANIONGAP 2 (L) 09/15/2020    GLC 99 07/05/2022     (H) 09/15/2020    BUN 14 07/05/2022    BUN 13 09/15/2020    CR 0.84 07/05/2022    CR 0.88 09/15/2020    GFRESTIMATED 66 07/05/2022    GFRESTIMATED 59 (L) 09/15/2020    GFRESTBLACK 68 09/15/2020    TONY 8.3 (L) 07/05/2022    TONY 8.9 09/15/2020        A1C RESULTS:  No results found for: A1C    INR RESULTS:  Lab Results   Component Value Date    INR 1.14 03/30/2019    INR 1.04 01/29/2010           Desirae Dockery PA-C  July 5, 2022         Thank you for allowing me to participate in the care of your patient.      Sincerely,     Desirae Dockery PA-C     [facility]  cc:   No referring provider defined for this encounter.

## 2022-07-05 NOTE — PROGRESS NOTES
Primary Cardiologist: Dr. Jama    Reason for Visit: Hospital follow up    History of Present Illness:   Gustavo is a pleasant 89 year old female with past medical history notable for paroxysmal atrial fibrillation, COPD, and tobacco dependence.    Gustavo follows with Dr. Jama for management of her paroxysmal atrial fibrillation.  She was reluctant to initiate anticoagulation despite her [id number] score of 3 but eventually decided to be on apixaban.  She tells me she cannot tolerate this medication as she developed epistaxis almost every other day and stopped it on her own.  For her atrial fibrillation she was on Metroprolol tartrate 25 mg twice daily as well as digoxin.  Her initial atrial fibrillation RVR episode was in the setting of double pneumonia.    She recently was admitted to a local hospital in her town with recurrent A. fib RVR.  This was in the setting of significant gastroenteritis with watery diarrhea and no food/water intake for 2 to 3 days.  Her pulse was significantly high requiring IV digoxin.  She converted to normal sinus rhythm on her own after IV fluid therapy.  Her echocardiogram was completed which showed preserved biventricular function with no wall motion normalities or valve issues.  She was initiated on baby aspirin as she was reluctant to start anticoagulation and was discharged home with close cardiology follow-up.    Gustavo presents to clinic today by herself, stating she is doing much better.  Her diarrhea has resolved and her appetite is back.  She is not interested in any form of anticoagulation other than baby aspirin due to significant epistaxis.  We have briefly talked about watchman device but she is reluctant with this.  She is aware of the stroke risk as both her spouse and her son dealt with multiple strokes.  She feels that when it is her time to go she is ready.  She denies shortness of breath, orthopnea, peripheral edema, palpitations, chest discomfort, or recurrent  bleeding issues with baby aspirin.  Her hemoglobin was checked during recent admission which showed normal value.    Assessment and Plan:  Gustavo is a very pleasant 89-year-old female with past medical history notable for paroxysmal atrial fibrillation, COPD, tobacco dependence, and recent diagnosis of hypothyroidism.    Gustavo had recent A. fib RVR in the setting of gastroenteritis.  She converted to normal sinus rhythm spontaneously and has been doing well on rate control strategy with low-dose digoxin.    She appears to be doing much better.  Her ECG today shows normal sinus rhythm.  Her blood pressure and pulse are under good rate control.  She is only on digoxin therapy for rate control.  She has lost about 30 pounds of weight over the last few years.  She is adamant to not be on apixaban thus we will continue with aspirin 81 mg daily.  Her [id number] score is 3 and her has bled score is 1.  Her annual stroke risk is 3.6% and annual major bleeding risk of 3.4%.  We have considered switching her from digoxin to metoprolol given her advanced age but she tells me her blood pressure was quite low when she was on metoprolol.  I will have her follow-up with her primary cardiologist in 6 months.    This note was completed in part using Dragon voice recognition software. Although reviewed after completion, some word and grammatical errors may occur.    Orders this Visit:  Orders Placed This Encounter   Procedures     EKG 12-LEAD CLINIC READ     Orders Placed This Encounter   Medications     digoxin (LANOXIN) 125 MCG tablet     Sig: Take 125 mcg by mouth daily     levothyroxine (SYNTHROID/LEVOTHROID) 25 MCG tablet     Sig: take 1 tablet by mouth once daily, you need to have tsh rechecked at the clinic lab in 2 weeks     DULERA 200-5 MCG/ACT inhaler     Sig: inhale 2 puffs using inhaler twice a day     aspirin 81 MG EC tablet     Sig: Take 81 mg by mouth daily     Medications Discontinued During This Encounter    Medication Reason     metoprolol tartrate (LOPRESSOR) 25 MG tablet      apixaban ANTICOAGULANT (ELIQUIS) 5 MG tablet      benzocaine-menthol (CHLORASEPTIC) 6-10 MG lozenge      albuterol (PROVENTIL) (2.5 MG/3ML) 0.083% neb solution          Encounter Diagnoses   Name Primary?     Paroxysmal atrial fibrillation (H) Yes     Benign essential hypertension        CURRENT MEDICATIONS:  Current Outpatient Medications   Medication Sig Dispense Refill     aspirin 81 MG EC tablet Take 81 mg by mouth daily       digoxin (LANOXIN) 125 MCG tablet Take 125 mcg by mouth daily       DULERA 200-5 MCG/ACT inhaler inhale 2 puffs using inhaler twice a day       levothyroxine (SYNTHROID/LEVOTHROID) 25 MCG tablet take 1 tablet by mouth once daily, you need to have tsh rechecked at the clinic lab in 2 weeks         ALLERGIES     Allergies   Allergen Reactions     No Known Drug Allergies        PAST MEDICAL HISTORY:  Past Medical History:   Diagnosis Date     Unspecified hemorrhoids without mention of complication     Hemorrhoids     Unspecified nonsenile cataract        PAST SURGICAL HISTORY:  Past Surgical History:   Procedure Laterality Date     C TOTAL HIP ARTHROPLASTY  01/26/10    Right     HC REMV CATARACT EXTRACAP,INSERT LENS  2/17/2004    Right     HC REMV CATARACT EXTRACAP,INSERT LENS  3/16/2004    Left       FAMILY HISTORY:  Family History   Problem Relation Age of Onset     Diabetes Brother         x2 brothers     Cancer Mother         lung cancer       SOCIAL HISTORY:  Social History     Socioeconomic History     Marital status:      Number of children: 6     Years of education: 10   Occupational History     Occupation: dish washing     Comment: [employer]   Tobacco Use     Smoking status: Former Smoker     Packs/day: 0.50     Years: 40.00     Pack years: 20.00     Types: Cigarettes     Smokeless tobacco: Never Used   Substance and Sexual Activity     Alcohol use: Never     Comment: occ wine     Drug use:  "No     Sexual activity: Not Currently   Other Topics Concern      Service No     Blood Transfusions No     Caffeine Concern No     Comment: coffee  10 cups/day     Occupational Exposure Yes     Comment: hot water at work     Hobby Hazards No     Sleep Concern No     Stress Concern No     Weight Concern No     Special Diet Yes     Comment: lo fat diet     Back Care No     Exercise Yes     Comment: walking at work      Bike Helmet No     Seat Belt Yes     Self-Exams Yes       Review of Systems:  Skin:  Negative     Eyes:  Positive for glasses  ENT:  Negative    Respiratory:  Positive for dyspnea on exertion  Cardiovascular:  Negative for;palpitations;chest pain;edema Positive for;lightheadedness;dizziness  Gastroenterology: Negative    Genitourinary:  Negative    Musculoskeletal:  Negative    Neurologic:  Negative    Psychiatric:  Negative    Heme/Lymph/Imm:  Negative    Endocrine:  Negative thyroid disorder    Physical Exam:  Vitals: /74 (BP Location: Right arm, Patient Position: Sitting, Cuff Size: Adult Regular)   Pulse 82   Ht 1.6 m (5' 3\")   Wt 62.1 kg (137 lb)   LMP  (LMP Unknown)   SpO2 96%   BMI 24.27 kg/m       GEN:  NAD  NECK: No JVD  C/V:  Regular rate and rhythm, no murmur, rub or gallop.  RESP: Clear to auscultation bilaterally without wheezing, rales, or rhonchi.  GI: Abdomen soft, nontender, nondistended.   EXTREM: No pitting LE edema.   NEURO: Alert and oriented, cooperative. No obvious focal deficits.   PSYCH: Normal affect.  SKIN: Warm and dry.       Recent Lab Results:  LIPID RESULTS:  Lab Results   Component Value Date    CHOL 189 05/14/2015    HDL 57 05/14/2015     05/14/2015    TRIG 120 05/14/2015    CHOLHDLRATIO 3.3 05/14/2015       LIVER ENZYME RESULTS:  Lab Results   Component Value Date    AST 36 04/02/2019    ALT 22 04/02/2019       CBC RESULTS:  Lab Results   Component Value Date    WBC 5.0 04/05/2019    RBC 3.74 (L) 04/05/2019    HGB 12.3 04/09/2019    HCT 31.5 " (L) 04/05/2019    MCV 84 04/05/2019    MCH 27.3 04/05/2019    MCHC 32.4 04/05/2019    RDW 14.0 04/05/2019     04/09/2019       BMP RESULTS:  Lab Results   Component Value Date     07/05/2022     09/15/2020    POTASSIUM 3.6 07/05/2022    POTASSIUM 4.3 09/15/2020    CHLORIDE 113 (H) 07/05/2022    CHLORIDE 108 09/15/2020    CO2 26 07/05/2022    CO2 30 09/15/2020    ANIONGAP 5 07/05/2022    ANIONGAP 2 (L) 09/15/2020    GLC 99 07/05/2022     (H) 09/15/2020    BUN 14 07/05/2022    BUN 13 09/15/2020    CR 0.84 07/05/2022    CR 0.88 09/15/2020    GFRESTIMATED 66 07/05/2022    GFRESTIMATED 59 (L) 09/15/2020    GFRESTBLACK 68 09/15/2020    TONY 8.3 (L) 07/05/2022    TONY 8.9 09/15/2020        A1C RESULTS:  No results found for: A1C    INR RESULTS:  Lab Results   Component Value Date    INR 1.14 03/30/2019    INR 1.04 01/29/2010           Desirae Dockery PA-C  July 5, 2022

## 2022-08-15 ENCOUNTER — TELEPHONE (OUTPATIENT)
Dept: CARDIOLOGY | Facility: CLINIC | Age: 87
End: 2022-08-15

## 2022-08-15 DIAGNOSIS — I48.0 PAROXYSMAL ATRIAL FIBRILLATION (H): Primary | ICD-10-CM

## 2022-08-15 NOTE — CONFIDENTIAL NOTE
Patient was last seen by MARA Chang on 7/5/22 who switched patient from Metoprolol to digoxin. Will route to Desirae to see if it is okay for patient to go back to metoprolol 25mg.       Assessment and Plan:  Gustavo is a very pleasant 89-year-old female with past medical history notable for paroxysmal atrial fibrillation, COPD, tobacco dependence, and recent diagnosis of hypothyroidism.     Gustavo had recent A. fib RVR in the setting of gastroenteritis.  She converted to normal sinus rhythm spontaneously and has been doing well on rate control strategy with low-dose digoxin.     She appears to be doing much better.  Her ECG today shows normal sinus rhythm.  Her blood pressure and pulse are under good rate control.  She is only on digoxin therapy for rate control.  She has lost about 30 pounds of weight over the last few years.  She is adamant to not be on apixaban thus we will continue with aspirin 81 mg daily.  Her [id number] score is 3 and her has bled score is 1.  Her annual stroke risk is 3.6% and annual major bleeding risk of 3.4%.  We have considered switching her from digoxin to metoprolol given her advanced age but she tells me her blood pressure was quite low when she was on metoprolol.  I will have her follow-up with her primary cardiologist in 6 months.

## 2022-08-15 NOTE — TELEPHONE ENCOUNTER
Reason for Call:  Other prescription    Detailed comments: Patient states she is wanting to go back on metoprolol tartrate (LOPRESSOR) 25 MG tablet. Uses the [facility] [facility] [place], [place] - [address] [place] [address]    Phone Number Patient can be reached at: Home number on file [phone] (home)    Best Time: any    Can we leave a detailed message on this number? YES    Call taken on 8/15/2022 at 10:30 AM by Susan Ortega

## 2022-08-16 ENCOUNTER — TELEPHONE (OUTPATIENT)
Dept: CARDIOLOGY | Facility: CLINIC | Age: 87
End: 2022-08-16

## 2022-08-16 NOTE — TELEPHONE ENCOUNTER
I returned a fax from [facility] asking for a refill of Metoprolol. It was discontinued 7/5/22.

## 2022-08-18 RX ORDER — METOPROLOL TARTRATE 25 MG/1
25 TABLET, FILM COATED ORAL 2 TIMES DAILY
Qty: 180 TABLET | Refills: 3 | Status: SHIPPED | OUTPATIENT
Start: 2022-08-18 | End: 2023-09-08

## 2022-08-18 NOTE — TELEPHONE ENCOUNTER
Metoprolol script sent to pharmacy and discontinued digoxin.       Desirae Dockery, Cyndee Alvarez, RN  Caller: Unspecified (3 days ago, 10:28 AM)  Josh Cotter,     I was out sick this week, so just reviewing this message now.     Yes, it should be fine to go back to metoprolol.     Thanks,     Desirae

## 2023-01-04 NOTE — TELEPHONE ENCOUNTER
Reason for Call:  Other prescription    Detailed comments: Gustavo had an appointment here this am and  requested a refill on her Digoxin 125 mg.  She uses the [facility]    Phone Number Patient can be reached at: Cell number on file:    Telephone Information:   Mobile [phone]       Best Time: any    Can we leave a detailed message on this number? YES    Call taken on 9/15/2020 at 10:02 AM by Candie Lopez       Why Was The Change Made?: Please Select the Appropriate Response

## 2023-01-31 ENCOUNTER — VIRTUAL VISIT (OUTPATIENT)
Dept: CARDIOLOGY | Facility: CLINIC | Age: 88
End: 2023-01-31
Attending: PHYSICIAN ASSISTANT
Payer: COMMERCIAL

## 2023-01-31 VITALS
HEART RATE: 65 BPM | SYSTOLIC BLOOD PRESSURE: 124 MMHG | HEIGHT: 65 IN | DIASTOLIC BLOOD PRESSURE: 62 MMHG | BODY MASS INDEX: 20.83 KG/M2 | WEIGHT: 125 LBS

## 2023-01-31 DIAGNOSIS — I48.0 PAROXYSMAL ATRIAL FIBRILLATION (H): Primary | ICD-10-CM

## 2023-01-31 DIAGNOSIS — I10 BENIGN ESSENTIAL HYPERTENSION: ICD-10-CM

## 2023-01-31 DIAGNOSIS — Z72.0 TOBACCO USE: ICD-10-CM

## 2023-01-31 PROCEDURE — 99443 PR PHYSICIAN TELEPHONE EVALUATION 21-30 MIN: CPT | Mod: 93 | Performed by: INTERNAL MEDICINE

## 2023-01-31 RX ORDER — DIGOXIN 125 MCG
125 TABLET ORAL DAILY
Qty: 90 TABLET | Refills: 4 | Status: SHIPPED | OUTPATIENT
Start: 2023-01-31 | End: 2023-04-25

## 2023-01-31 RX ORDER — DIGOXIN 125 MCG
125 TABLET ORAL DAILY
COMMUNITY
End: 2023-01-31

## 2023-01-31 NOTE — LETTER
1/31/2023    Radiology [facility] Non-[facility]  Provide  No address on file    RE: Gustavo Palmer       Dear Colleague,     I had the pleasure of seeing Gustavo Palmer in the [facility].  Gustavo is a 89 year old who is being evaluated via a billable telephone visit.      What phone number would you like to be contacted at? [phone]  How would you like to obtain your AVS? Mail a copy    Distant Location (provider location):  On-site  Phone call duration: 25  minutes    .  HPI and Plan:   See dictation    Orders Placed This Encounter   Procedures     Basic metabolic panel     Follow-Up with Cardiology ABIGAIL     Echocardiogram Complete     Orders Placed This Encounter   Medications     DISCONTD: digoxin (LANOXIN) 125 MCG tablet     Sig: Take 125 mcg by mouth daily     digoxin (LANOXIN) 125 MCG tablet     Sig: Take 1 tablet (125 mcg) by mouth daily     Dispense:  90 tablet     Refill:  4     Medications Discontinued During This Encounter   Medication Reason     digoxin (LANOXIN) 125 MCG tablet Reorder (No AVS / No eCancel)         Encounter Diagnoses   Name Primary?     Paroxysmal atrial fibrillation (H) Yes     Benign essential hypertension      Tobacco use        CURRENT MEDICATIONS:  Current Outpatient Medications   Medication Sig Dispense Refill     aspirin 81 MG EC tablet Take 81 mg by mouth daily       digoxin (LANOXIN) 125 MCG tablet Take 1 tablet (125 mcg) by mouth daily 90 tablet 4     DULERA 200-5 MCG/ACT inhaler inhale 2 puffs using inhaler twice a day       levothyroxine (SYNTHROID/LEVOTHROID) 25 MCG tablet take 1 tablet by mouth once daily, you need to have tsh rechecked at the clinic lab in 2 weeks       metoprolol tartrate (LOPRESSOR) 25 MG tablet Take 1 tablet (25 mg) by mouth 2 times daily 180 tablet 3       ALLERGIES     Allergies   Allergen Reactions     No Known Drug Allergies        PAST MEDICAL HISTORY:  Past Medical History:   Diagnosis Date     Unspecified hemorrhoids without mention  "of complication     Hemorrhoids     Unspecified nonsenile cataract        PAST SURGICAL HISTORY:  Past Surgical History:   Procedure Laterality Date     HC REMV CATARACT EXTRACAP,INSERT LENS, W/O ECP  2/17/2004    Right     HC REMV CATARACT EXTRACAP,INSERT LENS, W/O ECP  3/16/2004    Left     ZZC TOTAL HIP ARTHROPLASTY  01/26/10    Right       FAMILY HISTORY:  Family History   Problem Relation Age of Onset     Diabetes Brother         x2 brothers     Cancer Mother         lung cancer       SOCIAL HISTORY:  Social History     Socioeconomic History     Marital status:      Spouse name: None     Number of children: 6     Years of education: 10     Highest education level: None   Occupational History     Occupation: dish washing     Comment: [employer]   Tobacco Use     Smoking status: Every Day     Packs/day: 0.50     Years: 40.00     Pack years: 20.00     Types: Cigarettes     Smokeless tobacco: Never   Substance and Sexual Activity     Alcohol use: Never     Comment: occ wine     Drug use: No     Sexual activity: Not Currently   Other Topics Concern      Service No     Blood Transfusions No     Caffeine Concern No     Comment: coffee  10 cups/day     Occupational Exposure Yes     Comment: hot water at work     Hobby Hazards No     Sleep Concern No     Stress Concern No     Weight Concern No     Special Diet Yes     Comment: lo fat diet     Back Care No     Exercise Yes     Comment: walking at work      Bike Helmet No     Seat Belt Yes     Self-Exams Yes       Physical Exam:  Vitals: /62   Pulse 65   Ht 1.651 m (5' 5\")   Wt 56.7 kg (125 lb)   LMP  (LMP Unknown)   BMI 20.80 kg/m      Constitutional:           Skin:             Head:           Eyes:           Lymph:      ENT:           Neck:           Respiratory:            Cardiac:                                                           GI:           Extremities and Muscular Skeletal:                 Neurological:       "     Psych:         Recent Lab Results:  LIPID RESULTS:  Lab Results   Component Value Date    CHOL 189 05/14/2015    HDL 57 05/14/2015     05/14/2015    TRIG 120 05/14/2015    CHOLHDLRATIO 3.3 05/14/2015       LIVER ENZYME RESULTS:  Lab Results   Component Value Date    AST 36 04/02/2019    ALT 22 04/02/2019       CBC RESULTS:  Lab Results   Component Value Date    WBC 5.0 04/05/2019    RBC 3.74 (L) 04/05/2019    HGB 12.3 04/09/2019    HCT 31.5 (L) 04/05/2019    MCV 84 04/05/2019    MCH 27.3 04/05/2019    MCHC 32.4 04/05/2019    RDW 14.0 04/05/2019     04/09/2019       BMP RESULTS:  Lab Results   Component Value Date     07/05/2022     09/15/2020    POTASSIUM 3.6 07/05/2022    POTASSIUM 4.3 09/15/2020    CHLORIDE 113 (H) 07/05/2022    CHLORIDE 108 09/15/2020    CO2 26 07/05/2022    CO2 30 09/15/2020    ANIONGAP 5 07/05/2022    ANIONGAP 2 (L) 09/15/2020    GLC 99 07/05/2022     (H) 09/15/2020    BUN 14 07/05/2022    BUN 13 09/15/2020    CR 0.84 07/05/2022    CR 0.88 09/15/2020    GFRESTIMATED 66 07/05/2022    GFRESTIMATED 59 (L) 09/15/2020    GFRESTBLACK 68 09/15/2020    TONY 8.3 (L) 07/05/2022    TONY 8.9 09/15/2020        A1C RESULTS:  No results found for: A1C    INR RESULTS:  Lab Results   Component Value Date    INR 1.14 03/30/2019    INR 1.04 01/29/2010           CC  Desirae Dockery PA-C  [address] [id number]                  Service Date: 01/31/2023    TELEPHONE VISIT    HISTORY OF PRESENT ILLNESS:  I had the opportunity to see Ms. Gustavo Palmer in Cardiology Clinic today at [facility] in [place] for reevaluation of paroxysmal atrial fibrillation and hypertension.      She is an 89-year-old woman with COPD and was hospitalized in [place] several years ago with a COPD exacerbation and discovered to have atrial fibrillation with rapid ventricular response.  She converted spontaneously back to sinus rhythm, but was started on Eliquis and  metoprolol.  Eventually, she had to stop the Eliquis because of nosebleeds and has been taking a low-dose aspirin instead.  This summer, she had a bout of gastroenteritis and went into the urgent care, not feeling well.  She was again discovered to be in atrial fibrillation with rapid ventricular response and briefly hospitalized at [facility].  Her blood pressure was low with her high heart rates and she was not felt to be safe for the diltiazem or amiodarone for this reason.  Fortunately with IV fluids and IV digoxin, she converted spontaneously back to sinus rhythm.  Since then, she has felt well with no symptoms of recurrent atrial fibrillation.  She takes her blood pressure twice a day and her heart rates are consistently in the 60s and never over 70.  Her blood pressure is generally well controlled in the afternoons, although sometimes runs a little high in the evenings.    Her most recent blood pressure demonstrates good control.  She has not felt any symptoms of recurrent atrial fibrillation.  Since this is a virtual telephone visit, I have no further physical examination.    IMPRESSION:  Ms. Gustavo Palmer is an 89-year-old woman whom I am seeing for a virtual telephone visit today because of history of paroxysmal atrial fibrillation and hypertension.  She had a second episode of hypertension in the summer after having her first episode a number of years ago during her hospitalization in [place].  Last summer, she was hospitalized briefly at [facility] with rapid ventricular response, but again converted spontaneously back to sinus rhythm.  She was left on a low dose of metoprolol tartrate 25 mg b.i.d. plus digoxin 0.125 mg daily.  She tells me that she does not take the digoxin consistently.  She has been off of her blood thinner for a year and half.  She was taking Eliquis.  She had nosebleeds that became difficult to control.  Eliquis was discontinued because her  episodes of atrial fibrillation seemed to be so rare.  At that point, she had had only one episode.  She has a CHADS-VASc score of 4, which does significantly increase her risk of stroke and we talked about blood thinners again.  She is hesitant to restart those medication because of the bleeding issue and since she is not in atrial fibrillation very often.  I think continuing her on a low-dose aspirin at this time is reasonable, but I would not hesitate to restart her on a low dose of Eliquis 2.5 mg b.i.d. if she has recurrent atrial fibrillation.  She will take her blood pressure and heart rate twice a day as usual, and I encouraged her to call us if her heart rates are up significantly from her usual baseline, which would probably imply recurrence of atrial fibrillation.      Otherwise, we will plan to see her back again in 1 year for reevaluation.  This year, she had an echocardiogram when she was at [facility] showing normal left ventricular function with moderate tricuspid regurgitation and pulmonary hypertension.  We will reevaluate that issue when we see her back next year with another echocardiogram.    Mckay Higgins MD, F.A.C.C.    cc:    Norbert Coto MD   [place] [id number]     Mckay Higgins MD, [facility]        D: 2023   T: 2023   MT: ZULEIMA    Name:     RAYO NIELSEN  MRN:      [id number]        Account:      [id number]   :      06/15/1933           Service Date: 2023       Document: [id number]      Thank you for allowing me to participate in the care of your patient.      Sincerely,     MCKAY HIGGINS MD     [facility]  cc:   Desirae Dockery PA-C  [address]  [place] [address]

## 2023-01-31 NOTE — PROGRESS NOTES
Service Date: 01/31/2023    TELEPHONE VISIT    HISTORY OF PRESENT ILLNESS:  I had the opportunity to see Ms. Gustavo Palmer in Cardiology Clinic today at [facility] in [place] for reevaluation of paroxysmal atrial fibrillation and hypertension.      She is an 89-year-old woman with COPD and was hospitalized in [place] several years ago with a COPD exacerbation and discovered to have atrial fibrillation with rapid ventricular response.  She converted spontaneously back to sinus rhythm, but was started on Eliquis and metoprolol.  Eventually, she had to stop the Eliquis because of nosebleeds and has been taking a low-dose aspirin instead.  This summer, she had a bout of gastroenteritis and went into the urgent care, not feeling well.  She was again discovered to be in atrial fibrillation with rapid ventricular response and briefly hospitalized at [facility].  Her blood pressure was low with her high heart rates and she was not felt to be safe for the diltiazem or amiodarone for this reason.  Fortunately with IV fluids and IV digoxin, she converted spontaneously back to sinus rhythm.  Since then, she has felt well with no symptoms of recurrent atrial fibrillation.  She takes her blood pressure twice a day and her heart rates are consistently in the 60s and never over 70.  Her blood pressure is generally well controlled in the afternoons, although sometimes runs a little high in the evenings.    Her most recent blood pressure demonstrates good control.  She has not felt any symptoms of recurrent atrial fibrillation.  Since this is a virtual telephone visit, I have no further physical examination.    IMPRESSION:  Ms. Gustavo Palmer is an 89-year-old woman whom I am seeing for a virtual telephone visit today because of history of paroxysmal atrial fibrillation and hypertension.  She had a second episode of hypertension in the summer after having her first episode a number of years ago  during her hospitalization in [place].  Last summer, she was hospitalized briefly at [facility] with rapid ventricular response, but again converted spontaneously back to sinus rhythm.  She was left on a low dose of metoprolol tartrate 25 mg b.i.d. plus digoxin 0.125 mg daily.  She tells me that she does not take the digoxin consistently.  She has been off of her blood thinner for a year and half.  She was taking Eliquis.  She had nosebleeds that became difficult to control.  Eliquis was discontinued because her episodes of atrial fibrillation seemed to be so rare.  At that point, she had had only one episode.  She has a CHADS-VASc score of 4, which does significantly increase her risk of stroke and we talked about blood thinners again.  She is hesitant to restart those medication because of the bleeding issue and since she is not in atrial fibrillation very often.  I think continuing her on a low-dose aspirin at this time is reasonable, but I would not hesitate to restart her on a low dose of Eliquis 2.5 mg b.i.d. if she has recurrent atrial fibrillation.  She will take her blood pressure and heart rate twice a day as usual, and I encouraged her to call us if her heart rates are up significantly from her usual baseline, which would probably imply recurrence of atrial fibrillation.      Otherwise, we will plan to see her back again in 1 year for reevaluation.  This year, she had an echocardiogram when she was at [facility] showing normal left ventricular function with moderate tricuspid regurgitation and pulmonary hypertension.  We will reevaluate that issue when we see her back next year with another echocardiogram.    Brice Jama MD, F.A.C.C.    cc:    Norbert Coto MD   [place] [id number]     Brice Jama MD, [facility]        D: 01/31/2023   T: 01/31/2023   MT: ZULEIMA    Name:     RAYO NIELSEN  MRN:      [id number]        Account:      [id number]    :      06/15/1933           Service Date: 2023       Document: [id number]

## 2023-01-31 NOTE — PROGRESS NOTES
Gustavo is a 89 year old who is being evaluated via a billable telephone visit.      What phone number would you like to be contacted at? [phone]  How would you like to obtain your AVS? Mail a copy    Distant Location (provider location):  On-site  Phone call duration: 25  minutes    .  HPI and Plan:   See dictation    Orders Placed This Encounter   Procedures     Basic metabolic panel     Follow-Up with Cardiology ABIGAIL     Echocardiogram Complete     Orders Placed This Encounter   Medications     DISCONTD: digoxin (LANOXIN) 125 MCG tablet     Sig: Take 125 mcg by mouth daily     digoxin (LANOXIN) 125 MCG tablet     Sig: Take 1 tablet (125 mcg) by mouth daily     Dispense:  90 tablet     Refill:  4     Medications Discontinued During This Encounter   Medication Reason     digoxin (LANOXIN) 125 MCG tablet Reorder (No AVS / No eCancel)         Encounter Diagnoses   Name Primary?     Paroxysmal atrial fibrillation (H) Yes     Benign essential hypertension      Tobacco use        CURRENT MEDICATIONS:  Current Outpatient Medications   Medication Sig Dispense Refill     aspirin 81 MG EC tablet Take 81 mg by mouth daily       digoxin (LANOXIN) 125 MCG tablet Take 1 tablet (125 mcg) by mouth daily 90 tablet 4     DULERA 200-5 MCG/ACT inhaler inhale 2 puffs using inhaler twice a day       levothyroxine (SYNTHROID/LEVOTHROID) 25 MCG tablet take 1 tablet by mouth once daily, you need to have tsh rechecked at the clinic lab in 2 weeks       metoprolol tartrate (LOPRESSOR) 25 MG tablet Take 1 tablet (25 mg) by mouth 2 times daily 180 tablet 3       ALLERGIES     Allergies   Allergen Reactions     No Known Drug Allergies        PAST MEDICAL HISTORY:  Past Medical History:   Diagnosis Date     Unspecified hemorrhoids without mention of complication     Hemorrhoids     Unspecified nonsenile cataract        PAST SURGICAL HISTORY:  Past Surgical History:   Procedure Laterality Date     HC REMV CATARACT EXTRACAP,INSERT LENS, W/O  "ECP  2/17/2004    Right     HC REMV CATARACT EXTRACAP,INSERT LENS, W/O ECP  3/16/2004    Left     ZZC TOTAL HIP ARTHROPLASTY  01/26/10    Right       FAMILY HISTORY:  Family History   Problem Relation Age of Onset     Diabetes Brother         x2 brothers     Cancer Mother         lung cancer       SOCIAL HISTORY:  Social History     Socioeconomic History     Marital status:      Spouse name: None     Number of children: 6     Years of education: 10     Highest education level: None   Occupational History     Occupation: dish washing     Comment: [employer]   Tobacco Use     Smoking status: Every Day     Packs/day: 0.50     Years: 40.00     Pack years: 20.00     Types: Cigarettes     Smokeless tobacco: Never   Substance and Sexual Activity     Alcohol use: Never     Comment: occ wine     Drug use: No     Sexual activity: Not Currently   Other Topics Concern      Service No     Blood Transfusions No     Caffeine Concern No     Comment: coffee  10 cups/day     Occupational Exposure Yes     Comment: hot water at work     Hobby Hazards No     Sleep Concern No     Stress Concern No     Weight Concern No     Special Diet Yes     Comment: lo fat diet     Back Care No     Exercise Yes     Comment: walking at work      Bike Helmet No     Seat Belt Yes     Self-Exams Yes       Physical Exam:  Vitals: /62   Pulse 65   Ht 1.651 m (5' 5\")   Wt 56.7 kg (125 lb)   LMP  (LMP Unknown)   BMI 20.80 kg/m      Constitutional:           Skin:             Head:           Eyes:           Lymph:      ENT:           Neck:           Respiratory:            Cardiac:                                                           GI:           Extremities and Muscular Skeletal:                 Neurological:           Psych:         Recent Lab Results:  LIPID RESULTS:  Lab Results   Component Value Date    CHOL 189 05/14/2015    HDL 57 05/14/2015     05/14/2015    TRIG 120 05/14/2015    CHOLHDLRATIO 3.3 " 05/14/2015       LIVER ENZYME RESULTS:  Lab Results   Component Value Date    AST 36 04/02/2019    ALT 22 04/02/2019       CBC RESULTS:  Lab Results   Component Value Date    WBC 5.0 04/05/2019    RBC 3.74 (L) 04/05/2019    HGB 12.3 04/09/2019    HCT 31.5 (L) 04/05/2019    MCV 84 04/05/2019    MCH 27.3 04/05/2019    MCHC 32.4 04/05/2019    RDW 14.0 04/05/2019     04/09/2019       BMP RESULTS:  Lab Results   Component Value Date     07/05/2022     09/15/2020    POTASSIUM 3.6 07/05/2022    POTASSIUM 4.3 09/15/2020    CHLORIDE 113 (H) 07/05/2022    CHLORIDE 108 09/15/2020    CO2 26 07/05/2022    CO2 30 09/15/2020    ANIONGAP 5 07/05/2022    ANIONGAP 2 (L) 09/15/2020    GLC 99 07/05/2022     (H) 09/15/2020    BUN 14 07/05/2022    BUN 13 09/15/2020    CR 0.84 07/05/2022    CR 0.88 09/15/2020    GFRESTIMATED 66 07/05/2022    GFRESTIMATED 59 (L) 09/15/2020    GFRESTBLACK 68 09/15/2020    TONY 8.3 (L) 07/05/2022    TONY 8.9 09/15/2020        A1C RESULTS:  No results found for: A1C    INR RESULTS:  Lab Results   Component Value Date    INR 1.14 03/30/2019    INR 1.04 01/29/2010           CC  JEAN ChangC  [address] [address]                 Monthly or less

## 2023-04-25 ENCOUNTER — TELEPHONE (OUTPATIENT)
Dept: CARDIOLOGY | Facility: CLINIC | Age: 88
End: 2023-04-25
Payer: COMMERCIAL

## 2023-04-25 DIAGNOSIS — I48.0 PAROXYSMAL ATRIAL FIBRILLATION (H): ICD-10-CM

## 2023-04-25 RX ORDER — DIGOXIN 125 MCG
125 TABLET ORAL DAILY
Qty: 90 TABLET | Refills: 4 | Status: SHIPPED | OUTPATIENT
Start: 2023-04-25

## 2023-04-25 NOTE — TELEPHONE ENCOUNTER
Reason for Call:  Other prescription    Detailed comments: patient would like her Digoxin 125 MCG Oral Tablet (LANOXIN) to be refilled, she uses [facility] [address] [place] [address], [id number]  If you have any questions please reach out her her at [phone]    Phone Number Patient can be reached at: Cell number on file:    Telephone Information:   Mobile [phone]       Best Time: any    Can we leave a detailed message on this number? YES    Call taken on 4/25/2023 at 11:11 AM by Naida Chau

## 2023-09-07 NOTE — TELEPHONE ENCOUNTER
Call out to pt re: message left regarding request for inhaler refill from Dr. Jama.     Explained to Gustavo that Dr. Jama doesn't manage/prescribe the inhalers. I asked Gustavo if she's having worsening shortness of breath or any concerns she may be in afib today. Pt states she's feeling fine and only uses her inhaler PRN when she exerts herself more such as after hauling groceries. She said her PCP is no longer practicing at the clinic she used to go to. That clinic is about 7 miles from her home. Gustavo is going to call her PCP clinic in [place] and establish with a new PCP to obtain refills. Advised pt call us back if she ever does develop worsening shortness of breath, rapid heart rates, swelling etc. Jessie Saini RN on 3/9/2022 at 4:13 PM           Continued Stay Note  [facility]     Patient Name: Catalina Moreno  MRN: [id number]  Today's Date: 9/7/2023    Admit Date: 9/5/2023    Plan: Anticipate routine home. Referral sent to [facility].   Discharge Plan       Row Name 09/07/23 1224       Plan    Plan Anticipate routine home. Referral sent to [facility].    Plan Comments Anticipate routine home with [facility]. Patient is no longer having diarrhea today and will likely be ready to discharge. Referral send to [facility] to [facility] basket and farhad Pavon notified.                Ferny Grace RN      Office Phone: [phone]

## 2023-09-08 ENCOUNTER — TELEPHONE (OUTPATIENT)
Dept: CARDIOLOGY | Facility: CLINIC | Age: 88
End: 2023-09-08
Payer: COMMERCIAL

## 2023-09-08 DIAGNOSIS — I48.0 PAROXYSMAL ATRIAL FIBRILLATION (H): ICD-10-CM

## 2023-09-08 RX ORDER — METOPROLOL TARTRATE 25 MG/1
25 TABLET, FILM COATED ORAL 2 TIMES DAILY
Qty: 180 TABLET | Refills: 3 | Status: SHIPPED | OUTPATIENT
Start: 2023-09-08

## 2023-09-08 NOTE — TELEPHONE ENCOUNTER
Reason for Call:  Other appointment    Detailed comments: Patient calling to get a refill on her metropolol. She is completely out. She will call [facility] to see if they will fill a few for the weekend.     Phone Number Patient can be reached at: Home number on file [phone] (home)    Best Time: any    Can we leave a detailed message on this number? YES    Call taken on 9/8/2023 at 9:55 AM by Luann Choe

## 2023-09-08 NOTE — TELEPHONE ENCOUNTER
9/8/2023 REFILL REQUEST    Called to patient. Last seen by DR Jama 1/31/23. Patient reports no questions or concerns and took last Metoprolol this am. Rx sent to pharmacy at patient request.    [facility] Refill Guideline reviewed.  Medication meets criteria for refill.    Lottie Duong RN 09/08/23 10:36 AM `

## 2023-10-24 ENCOUNTER — HOSPITAL ENCOUNTER (OUTPATIENT)
Dept: CARDIOLOGY | Facility: CLINIC | Age: 88
Discharge: HOME | End: 2023-10-24
Attending: INTERNAL MEDICINE | Admitting: INTERNAL MEDICINE
Payer: COMMERCIAL

## 2023-10-24 ENCOUNTER — LAB (OUTPATIENT)
Dept: LAB | Facility: CLINIC | Age: 88
End: 2023-10-24
Payer: COMMERCIAL

## 2023-10-24 DIAGNOSIS — I48.0 PAROXYSMAL ATRIAL FIBRILLATION (H): ICD-10-CM

## 2023-10-24 LAB
ANION GAP SERPL CALCULATED.3IONS-SCNC: 10 MMOL/L (ref 7–15)
BUN SERPL-MCNC: 19.6 MG/DL (ref 8–23)
CALCIUM SERPL-MCNC: 9.4 MG/DL (ref 8.2–9.6)
CHLORIDE SERPL-SCNC: 105 MMOL/L (ref 98–107)
CREAT SERPL-MCNC: 0.97 MG/DL (ref 0.51–0.95)
DEPRECATED HCO3 PLAS-SCNC: 26 MMOL/L (ref 22–29)
EGFRCR SERPLBLD CKD-EPI 2021: 55 ML/MIN/1.73M2
GLUCOSE SERPL-MCNC: 99 MG/DL (ref 70–99)
LVEF ECHO: NORMAL
POTASSIUM SERPL-SCNC: 4.7 MMOL/L (ref 3.4–5.3)
SODIUM SERPL-SCNC: 141 MMOL/L (ref 135–145)

## 2023-10-24 PROCEDURE — 80048 BASIC METABOLIC PNL TOTAL CA: CPT

## 2023-10-24 PROCEDURE — 93306 TTE W/DOPPLER COMPLETE: CPT

## 2023-10-24 PROCEDURE — 36415 COLL VENOUS BLD VENIPUNCTURE: CPT

## 2023-10-24 PROCEDURE — 93306 TTE W/DOPPLER COMPLETE: CPT | Mod: 26 | Performed by: INTERNAL MEDICINE

## 2023-10-31 ENCOUNTER — OFFICE VISIT (OUTPATIENT)
Dept: CARDIOLOGY | Facility: CLINIC | Age: 88
End: 2023-10-31
Payer: COMMERCIAL

## 2023-10-31 VITALS
WEIGHT: 141.5 LBS | SYSTOLIC BLOOD PRESSURE: 134 MMHG | HEIGHT: 63 IN | BODY MASS INDEX: 25.07 KG/M2 | RESPIRATION RATE: 22 BRPM | DIASTOLIC BLOOD PRESSURE: 70 MMHG | OXYGEN SATURATION: 98 % | HEART RATE: 66 BPM

## 2023-10-31 DIAGNOSIS — I10 BENIGN ESSENTIAL HYPERTENSION: Primary | ICD-10-CM

## 2023-10-31 DIAGNOSIS — I48.0 PAROXYSMAL ATRIAL FIBRILLATION (H): ICD-10-CM

## 2023-10-31 PROCEDURE — 99214 OFFICE O/P EST MOD 30 MIN: CPT | Mod: 25 | Performed by: INTERNAL MEDICINE

## 2023-10-31 PROCEDURE — 93000 ELECTROCARDIOGRAM COMPLETE: CPT | Performed by: INTERNAL MEDICINE

## 2023-10-31 ASSESSMENT — PAIN SCALES - GENERAL: PAINLEVEL: NO PAIN (0)

## 2023-10-31 NOTE — LETTER
10/31/2023        RE: Gustavo Palmer       Dear Colleague,     I had the pleasure of seeing Gustavo Palmer in the [facility].    General Cardiology Clinic Progress Note  Gustavo Palmer MRN# [id number]   YOB: 1933 Age: 89 year old       Reason for visit: Paroxysmal atrial fibrillation    History of presenting illness:    I had the opportunity to see Gustavo Palmer at [facility] today for reevaluation of paroxysmal atrial fibrillation and hypertension.    She is an 89-year-old woman with COPD and was hospitalized in [place] in 2019 with a COPD exacerbation and discovered to have atrial fibrillation with rapid ventricular response.  She converted spontaneously back to sinus rhythm and was started on Eliquis and metoprolol.  Eventually, she had to stop the Eliquis because of nosebleeds and has been taking a low-dose aspirin instead.  In 2022, she had a bout of gastroenteritis and went into the urgent care, not feeling well.  She was again discovered to be in atrial fibrillation with rapid ventricular response and briefly hospitalized at [facility].  Her blood pressure was low with her high heart rates and she was not felt to be safe for the diltiazem or amiodarone for this reason. Fortunately with IV fluids and IV digoxin, she converted spontaneously back to sinus rhythm.     She has not had any symptomatic recurrences of atrial fibrillation since then.  With her atrial fibrillation, she felt like her heart was jumping around in her chest.  She lies in bed and feels her chest with her hand and has not felt any of that irregular heartbeat issues for the last year.  She also takes her blood pressure at home every morning and has not had any high heart rate episodes.  She continues to take digoxin 0.125 mg p.o. every other day and metoprolol tartrate 25 mg p.o. twice daily.  She remains on low-dose aspirin at this point.    She has no chest pain,  lightheadedness, or syncope.  She has shortness of breath with exertion, such as walking down a long hallway, but her shortness of breath resolves quickly with the use of her inhaler.    EKG today demonstrates normal sinus rhythm.  Her echocardiogram done on 10/24/2023 shows normal left ventricular size and function with some flattening of her interventricular septum due to right ventricular pressure overload.  She has moderate pulmonary hypertension and mild regurgitation of the aortic, mitral, and pulmonic valves and mild to moderate tricuspid regurgitation.          Assessment and Plan:     ASSESSMENT:    Ms. Gustavo Palmer is a 89-year-old woman with COPD and paroxysmal atrial fibrillation.  She has had 2 episodes of atrial fibrillation in the past, one episode with COPD exacerbation and another episode with gastroenteritis.  She converted back to sinus rhythm spontaneously with both episodes.  She is now maintaining sinus rhythm consistently, at least by her account.  She is no longer on anticoagulation because of nosebleeds.  She is on a low-dose aspirin.  She is also on metoprolol and digoxin.    She seems to be doing very well and I will not restart anticoagulation at this point.  She has had 2 falling episodes since I saw her last but not with any associated injury.  She is mostly concerned about recurrent nosebleed issues.  If she has recurrent atrial fibrillation I encouraged her to contact us or go to the emergency room for evaluation and treatment and to restart anticoagulation for stroke prevention.  I would recommend Eliquis 2.5 mg p.o. twice daily.    I will plan to see her back again in 1 year for reevaluation.    Brice Jama MD           Orders this Visit:  Orders Placed This Encounter   Procedures    Follow-Up with Cardiology    EKG 12-lead complete w/read - Clinics (performed today)     No orders of the defined types were placed in this encounter.    There are no discontinued  "medications.    Today's clinic visit entailed:    30 minutes spent by me on the date of the encounter doing chart review, history and exam, documentation and further activities per the note  Provider  Link to [facility] Help Grid     The level of medical decision making during this visit was of moderate complexity.           Review of Systems:     Review of Systems:  Skin:        Eyes:  Positive for glasses  ENT:       Respiratory:  Positive for dyspnea on exertion  Cardiovascular:  Negative;palpitations;chest pain;edema;lightheadedness;dizziness;syncope or near-syncope;fatigue    Gastroenterology:      Genitourinary:       Musculoskeletal:       Neurologic:  Negative numbness or tingling of feet;numbness or tingling of hands  Psychiatric:       Heme/Lymph/Imm:       Endocrine:                 Physical Exam:     Vitals: /70 (BP Location: Left arm, Patient Position: Sitting, Cuff Size: Adult Regular)   Pulse 66   Resp 22   Ht 1.6 m (5' 3\")   Wt 64.2 kg (141 lb 8 oz)   LMP  (LMP Unknown)   SpO2 98%   BMI 25.07 kg/m    Constitutional: Well nourished and in no apparent distress.  Eyes: Pupils equal, round. Sclerae anicteric.   HEENT: Normocephalic, atraumatic.   Neck: Supple. JVD   Respiratory: Breathing non-labored. Lungs clear to auscultation bilaterally. No crackles, wheezes, rhonchi, or rales.  Cardiovascular:  Regular rate and rhythm, normal S1 and S2. No murmur, rub, or gallop.  Skin: Warm, dry. No rashes, cyanosis, or xanthelasma.  Extremities: No edema.  Neurologic: No gross motor deficits. Alert, awake, and oriented to person, place and time.  Psychiatric: Affect appropriate.             Medications:     Current Outpatient Medications   Medication Sig Dispense Refill    aspirin 81 MG EC tablet Take 81 mg by mouth daily      digoxin (LANOXIN) 125 MCG tablet Take 1 tablet (125 mcg) by mouth daily 90 tablet 4    DULERA 200-5 MCG/ACT inhaler inhale 2 puffs using inhaler twice a day      levothyroxine " (SYNTHROID/LEVOTHROID) 25 MCG tablet take 1 tablet by mouth once daily, you need to have tsh rechecked at the clinic lab in 2 weeks      metoprolol tartrate (LOPRESSOR) 25 MG tablet Take 1 tablet (25 mg) by mouth 2 times daily 180 tablet 3       Family History   Problem Relation Age of Onset    Diabetes Brother         x2 brothers    Cancer Mother         lung cancer       Social History     Socioeconomic History    Marital status:      Spouse name: Not on file    Number of children: 6    Years of education: 10    Highest education level: Not on file   Occupational History    Occupation: dish washing     Comment: [employer]   Tobacco Use    Smoking status: Every Day     Packs/day: 0.50     Years: 40.00     Additional pack years: 0.00     Total pack years: 20.00     Types: Cigarettes    Smokeless tobacco: Never   Substance and Sexual Activity    Alcohol use: Never     Comment: occ wine    Drug use: No    Sexual activity: Not Currently   Other Topics Concern     Service No    Blood Transfusions No    Caffeine Concern No     Comment: coffee  10 cups/day    Occupational Exposure Yes     Comment: hot water at work    Hobby Hazards No    Sleep Concern No    Stress Concern No    Weight Concern No    Special Diet Yes     Comment: lo fat diet    Back Care No    Exercise Yes     Comment: walking at work     Bike Helmet No    Seat Belt Yes    Self-Exams Yes    Parent/sibling w/ CABG, MI or angioplasty before 65F 55M? Not Asked   Social History Narrative    Not on file     Social Determinants of Health     Financial Resource Strain: Not on file   Food Insecurity: Not on file   Transportation Needs: Not on file   Physical Activity: Not on file   Stress: Not on file   Social Connections: Not on file   Interpersonal Safety: Not on file   Housing Stability: Not on file            Past Medical History:     Past Medical History:   Diagnosis Date    Unspecified hemorrhoids without mention of complication      "Hemorrhoids    Unspecified nonsenile cataract               Past Surgical History:     Past Surgical History:   Procedure Laterality Date    HC REMV CATARACT EXTRACAP,INSERT LENS, W/O ECP  2/17/2004    Right    HC REMV CATARACT EXTRACAP,INSERT LENS, W/O ECP  3/16/2004    Left    ZZC TOTAL HIP ARTHROPLASTY  01/26/10    Right              Allergies:   No known drug allergy       Data:   All laboratory data reviewed:    Recent Labs   Lab Test 04/03/19  0400 04/02/19  1020 03/30/19  0927   TSH  --   --  12.69*   IRON 29*  --   --    *  --   --    IRONSAT 18  --   --    JAMES  --  402*  --        Lab Results   Component Value Date    WBC 5.0 04/05/2019    RBC 3.74 (L) 04/05/2019    HGB 12.3 04/09/2019    HCT 31.5 (L) 04/05/2019    MCV 84 04/05/2019    MCH 27.3 04/05/2019    MCHC 32.4 04/05/2019    RDW 14.0 04/05/2019     04/09/2019       Lab Results   Component Value Date     10/24/2023     09/15/2020    POTASSIUM 4.7 10/24/2023    POTASSIUM 3.6 07/05/2022    POTASSIUM 4.3 09/15/2020    CHLORIDE 105 10/24/2023    CHLORIDE 113 (H) 07/05/2022    CHLORIDE 108 09/15/2020    CO2 26 10/24/2023    CO2 26 07/05/2022    CO2 30 09/15/2020    ANIONGAP 10 10/24/2023    ANIONGAP 5 07/05/2022    ANIONGAP 2 (L) 09/15/2020    GLC 99 10/24/2023    GLC 99 07/05/2022     (H) 09/15/2020    BUN 19.6 10/24/2023    BUN 14 07/05/2022    BUN 13 09/15/2020    CR 0.97 (H) 10/24/2023    CR 0.88 09/15/2020    GFRESTIMATED 55 (L) 10/24/2023    GFRESTIMATED 59 (L) 09/15/2020    GFRESTBLACK 68 09/15/2020    TONY 9.4 10/24/2023    TONY 8.9 09/15/2020      Lab Results   Component Value Date    AST 36 04/02/2019    ALT 22 04/02/2019       No results found for: \"A1C\"    Lab Results   Component Value Date    INR 1.14 03/30/2019    INR 1.04 01/29/2010         MCKAY HIGGINS MD  [facility]      Thank you for allowing me to participate in the care of your patient.      Sincerely,     MCKAY HIGGINS MD      Health " [facility]  cc:   Brice Jama MD  [address]  [place] [id number]

## 2023-10-31 NOTE — PROGRESS NOTES
General Cardiology Clinic Progress Note  Gustavo Palmer MRN# [id number]   YOB: 1933 Age: 89 year old       Reason for visit: Paroxysmal atrial fibrillation    History of presenting illness:    I had the opportunity to see Gustavo Palmer at [facility] today for reevaluation of paroxysmal atrial fibrillation and hypertension.    She is an 89-year-old woman with COPD and was hospitalized in [place] in 2019 with a COPD exacerbation and discovered to have atrial fibrillation with rapid ventricular response.  She converted spontaneously back to sinus rhythm and was started on Eliquis and metoprolol.  Eventually, she had to stop the Eliquis because of nosebleeds and has been taking a low-dose aspirin instead.  In 2022, she had a bout of gastroenteritis and went into the urgent care, not feeling well.  She was again discovered to be in atrial fibrillation with rapid ventricular response and briefly hospitalized at [facility].  Her blood pressure was low with her high heart rates and she was not felt to be safe for the diltiazem or amiodarone for this reason. Fortunately with IV fluids and IV digoxin, she converted spontaneously back to sinus rhythm.     She has not had any symptomatic recurrences of atrial fibrillation since then.  With her atrial fibrillation, she felt like her heart was jumping around in her chest.  She lies in bed and feels her chest with her hand and has not felt any of that irregular heartbeat issues for the last year.  She also takes her blood pressure at home every morning and has not had any high heart rate episodes.  She continues to take digoxin 0.125 mg p.o. every other day and metoprolol tartrate 25 mg p.o. twice daily.  She remains on low-dose aspirin at this point.    She has no chest pain, lightheadedness, or syncope.  She has shortness of breath with exertion, such as walking down a long hallway, but her shortness of breath resolves quickly  with the use of her inhaler.    EKG today demonstrates normal sinus rhythm.  Her echocardiogram done on 10/24/2023 shows normal left ventricular size and function with some flattening of her interventricular septum due to right ventricular pressure overload.  She has moderate pulmonary hypertension and mild regurgitation of the aortic, mitral, and pulmonic valves and mild to moderate tricuspid regurgitation.          Assessment and Plan:     ASSESSMENT:    Ms. Gustavo Palmer is a 89-year-old woman with COPD and paroxysmal atrial fibrillation.  She has had 2 episodes of atrial fibrillation in the past, one episode with COPD exacerbation and another episode with gastroenteritis.  She converted back to sinus rhythm spontaneously with both episodes.  She is now maintaining sinus rhythm consistently, at least by her account.  She is no longer on anticoagulation because of nosebleeds.  She is on a low-dose aspirin.  She is also on metoprolol and digoxin.    She seems to be doing very well and I will not restart anticoagulation at this point.  She has had 2 falling episodes since I saw her last but not with any associated injury.  She is mostly concerned about recurrent nosebleed issues.  If she has recurrent atrial fibrillation I encouraged her to contact us or go to the emergency room for evaluation and treatment and to restart anticoagulation for stroke prevention.  I would recommend Eliquis 2.5 mg p.o. twice daily.    I will plan to see her back again in 1 year for reevaluation.    Brice Jama MD           Orders this Visit:  Orders Placed This Encounter   Procedures    Follow-Up with Cardiology    EKG 12-lead complete w/read - Clinics (performed today)     No orders of the defined types were placed in this encounter.    There are no discontinued medications.    Today's clinic visit entailed:    30 minutes spent by me on the date of the encounter doing chart review, history and exam, documentation and further activities  "per the note  Provider  Link to MDM Help Grid     The level of medical decision making during this visit was of moderate complexity.           Review of Systems:     Review of Systems:  Skin:        Eyes:  Positive for glasses  ENT:       Respiratory:  Positive for dyspnea on exertion  Cardiovascular:  Negative;palpitations;chest pain;edema;lightheadedness;dizziness;syncope or near-syncope;fatigue    Gastroenterology:      Genitourinary:       Musculoskeletal:       Neurologic:  Negative numbness or tingling of feet;numbness or tingling of hands  Psychiatric:       Heme/Lymph/Imm:       Endocrine:                 Physical Exam:     Vitals: /70 (BP Location: Left arm, Patient Position: Sitting, Cuff Size: Adult Regular)   Pulse 66   Resp 22   Ht 1.6 m (5' 3\")   Wt 64.2 kg (141 lb 8 oz)   LMP  (LMP Unknown)   SpO2 98%   BMI 25.07 kg/m    Constitutional: Well nourished and in no apparent distress.  Eyes: Pupils equal, round. Sclerae anicteric.   HEENT: Normocephalic, atraumatic.   Neck: Supple. JVD   Respiratory: Breathing non-labored. Lungs clear to auscultation bilaterally. No crackles, wheezes, rhonchi, or rales.  Cardiovascular:  Regular rate and rhythm, normal S1 and S2. No murmur, rub, or gallop.  Skin: Warm, dry. No rashes, cyanosis, or xanthelasma.  Extremities: No edema.  Neurologic: No gross motor deficits. Alert, awake, and oriented to person, place and time.  Psychiatric: Affect appropriate.             Medications:     Current Outpatient Medications   Medication Sig Dispense Refill    aspirin 81 MG EC tablet Take 81 mg by mouth daily      digoxin (LANOXIN) 125 MCG tablet Take 1 tablet (125 mcg) by mouth daily 90 tablet 4    DULERA 200-5 MCG/ACT inhaler inhale 2 puffs using inhaler twice a day      levothyroxine (SYNTHROID/LEVOTHROID) 25 MCG tablet take 1 tablet by mouth once daily, you need to have tsh rechecked at the clinic lab in 2 weeks      metoprolol tartrate (LOPRESSOR) 25 MG tablet " Take 1 tablet (25 mg) by mouth 2 times daily 180 tablet 3       Family History   Problem Relation Age of Onset    Diabetes Brother         x2 brothers    Cancer Mother         lung cancer       Social History     Socioeconomic History    Marital status:      Spouse name: Not on file    Number of children: 6    Years of education: 10    Highest education level: Not on file   Occupational History    Occupation: dish washing     Comment: [employer]   Tobacco Use    Smoking status: Every Day     Packs/day: 0.50     Years: 40.00     Additional pack years: 0.00     Total pack years: 20.00     Types: Cigarettes    Smokeless tobacco: Never   Substance and Sexual Activity    Alcohol use: Never     Comment: occ wine    Drug use: No    Sexual activity: Not Currently   Other Topics Concern     Service No    Blood Transfusions No    Caffeine Concern No     Comment: coffee  10 cups/day    Occupational Exposure Yes     Comment: hot water at work    Hobby Hazards No    Sleep Concern No    Stress Concern No    Weight Concern No    Special Diet Yes     Comment: lo fat diet    Back Care No    Exercise Yes     Comment: walking at work     Bike Helmet No    Seat Belt Yes    Self-Exams Yes    Parent/sibling w/ CABG, MI or angioplasty before 65F 55M? Not Asked   Social History Narrative    Not on file     Social Determinants of Health     Financial Resource Strain: Not on file   Food Insecurity: Not on file   Transportation Needs: Not on file   Physical Activity: Not on file   Stress: Not on file   Social Connections: Not on file   Interpersonal Safety: Not on file   Housing Stability: Not on file            Past Medical History:     Past Medical History:   Diagnosis Date    Unspecified hemorrhoids without mention of complication     Hemorrhoids    Unspecified nonsenile cataract               Past Surgical History:     Past Surgical History:   Procedure Laterality Date    HC REMV CATARACT EXTRACAP,INSERT LENS,  "W/O ECP  2/17/2004    Right    HC REMV CATARACT EXTRACAP,INSERT LENS, W/O ECP  3/16/2004    Left    ZZC TOTAL HIP ARTHROPLASTY  01/26/10    Right              Allergies:   No known drug allergy       Data:   All laboratory data reviewed:    Recent Labs   Lab Test 04/03/19  0400 04/02/19  1020 03/30/19  0927   TSH  --   --  12.69*   IRON 29*  --   --    *  --   --    IRONSAT 18  --   --    JAMES  --  402*  --        Lab Results   Component Value Date    WBC 5.0 04/05/2019    RBC 3.74 (L) 04/05/2019    HGB 12.3 04/09/2019    HCT 31.5 (L) 04/05/2019    MCV 84 04/05/2019    MCH 27.3 04/05/2019    MCHC 32.4 04/05/2019    RDW 14.0 04/05/2019     04/09/2019       Lab Results   Component Value Date     10/24/2023     09/15/2020    POTASSIUM 4.7 10/24/2023    POTASSIUM 3.6 07/05/2022    POTASSIUM 4.3 09/15/2020    CHLORIDE 105 10/24/2023    CHLORIDE 113 (H) 07/05/2022    CHLORIDE 108 09/15/2020    CO2 26 10/24/2023    CO2 26 07/05/2022    CO2 30 09/15/2020    ANIONGAP 10 10/24/2023    ANIONGAP 5 07/05/2022    ANIONGAP 2 (L) 09/15/2020    GLC 99 10/24/2023    GLC 99 07/05/2022     (H) 09/15/2020    BUN 19.6 10/24/2023    BUN 14 07/05/2022    BUN 13 09/15/2020    CR 0.97 (H) 10/24/2023    CR 0.88 09/15/2020    GFRESTIMATED 55 (L) 10/24/2023    GFRESTIMATED 59 (L) 09/15/2020    GFRESTBLACK 68 09/15/2020    TONY 9.4 10/24/2023    TONY 8.9 09/15/2020      Lab Results   Component Value Date    AST 36 04/02/2019    ALT 22 04/02/2019       No results found for: \"A1C\"    Lab Results   Component Value Date    INR 1.14 03/30/2019    INR 1.04 01/29/2010         MCKAY HIGGINS MD  [facility]  "

## 2024-03-09 ENCOUNTER — TRANSFERRED RECORDS (OUTPATIENT)
Dept: HEALTH INFORMATION MANAGEMENT | Facility: CLINIC | Age: 89
End: 2024-03-09
Payer: COMMERCIAL

## 2024-03-10 ENCOUNTER — TRANSFERRED RECORDS (OUTPATIENT)
Dept: HEALTH INFORMATION MANAGEMENT | Facility: CLINIC | Age: 89
End: 2024-03-10
Payer: COMMERCIAL

## 2024-03-11 ENCOUNTER — TELEPHONE (OUTPATIENT)
Dept: ADMISSION | Facility: CLINIC | Age: 89
End: 2024-03-11
Payer: COMMERCIAL

## 2024-03-11 NOTE — TELEPHONE ENCOUNTER
Reason for Call:  Other call back    Detailed comments: Patient called stated that she went into afib again on Saturday night 03/09/24 and she went to the urgent care in [place], they had her stay in the hospital over night and she was released Sunday evening. She was told to call and just let  know about it and see if she should see him. Please look at this and give patient a call at [phone]    Phone Number Patient can be reached at: Cell number on file:    Telephone Information:   Mobile [phone]       Best Time: any    Can we leave a detailed message on this number? YES    Call taken on 3/11/2024 at 4:08 PM by Naida Chau

## 2024-03-12 NOTE — TELEPHONE ENCOUNTER
Relayed message below to patient, she is going to go to the [facility] and have them fax her records to Dr Jama at [phone]

## 2024-03-12 NOTE — TELEPHONE ENCOUNTER
Tried to reach patient, had to leave message with direct call back number. Unfortunately can not see records from [facility]. May need to have patient sign SHEA, do not see one in media tab for [place].

## 2024-03-18 NOTE — TELEPHONE ENCOUNTER
Received records from [facility].   Sent to stat scanning to be entered in pt. Chart.   Copy placed in RN drawer for further review.     Christina Patino on 3/18/2024 at 7:57 AM

## 2024-03-19 NOTE — TELEPHONE ENCOUNTER
Reviewed medical records. Patient should be set up for follow up for A Fib with [facility]. Scheduled visit with Luann Ramos NP on 3/28/24 at 2:00pm. Will have scheduling call patient to make sure this date and time will work for patient.

## 2024-03-28 ENCOUNTER — OFFICE VISIT (OUTPATIENT)
Dept: CARDIOLOGY | Facility: CLINIC | Age: 89
End: 2024-03-28
Payer: COMMERCIAL

## 2024-03-28 VITALS
RESPIRATION RATE: 22 BRPM | DIASTOLIC BLOOD PRESSURE: 72 MMHG | BODY MASS INDEX: 24.01 KG/M2 | SYSTOLIC BLOOD PRESSURE: 126 MMHG | HEART RATE: 69 BPM | OXYGEN SATURATION: 94 % | WEIGHT: 135.5 LBS | HEIGHT: 63 IN

## 2024-03-28 DIAGNOSIS — R06.09 DYSPNEA ON EXERTION: ICD-10-CM

## 2024-03-28 DIAGNOSIS — I48.91 ATRIAL FIBRILLATION WITH RVR (H): Primary | ICD-10-CM

## 2024-03-28 DIAGNOSIS — I10 BENIGN ESSENTIAL HYPERTENSION: ICD-10-CM

## 2024-03-28 PROCEDURE — 93000 ELECTROCARDIOGRAM COMPLETE: CPT | Performed by: NURSE PRACTITIONER

## 2024-03-28 PROCEDURE — 99214 OFFICE O/P EST MOD 30 MIN: CPT | Performed by: NURSE PRACTITIONER

## 2024-03-28 PROCEDURE — G2211 COMPLEX E/M VISIT ADD ON: HCPCS | Performed by: NURSE PRACTITIONER

## 2024-03-28 ASSESSMENT — PAIN SCALES - GENERAL: PAINLEVEL: NO PAIN (0)

## 2024-03-28 NOTE — PROGRESS NOTES
Cardiology Clinic Progress Note  Gustavo Palmer MRN# [id number]   YOB: 1933 Age: 89 year old       HPI:    Gustavo Palmer is a delightful 89 year old patient with past medical history significant for:    COPD  Paroxysmal atrial fibrillation with RVR.  On low-dose digoxin.  Eliquis was initiated, but stopped secondary to nosebleeds.  Currently on low-dose aspirin 81 mg daily recurrence of atrial fibrillation presented to [facility] 3/10/2024 was given IV diltiazem and converted in the ED.  Gastroenteritis  Hypothyroidism  CKD  History of tobacco use    It is my pleasure meeting Ms. Palmer at today's visit.  She is a delightful 89-year-old woman well-known to Dr. Jama.  As mentioned above, she does have a history of paroxysmal atrial fibrillation.  Had not had a reoccurrence for some time.  Unfortunately, presented to the ED on 3/9/2024 with significant dizziness.  Was noted to be in A-fib with RVR.  She was given IV fluids and given 15 mg of IV diltiazem and then given IV digoxin and converted to sinus rhythm.    She had a CT angiogram which was negative for aortic dissection or aneurysm.  She was monitored overnight.  She was discharged and asked to follow back up with cardiology.  They did discuss restarting Eliquis, but patient declined.    Today she is doing well.  Feels like she is back to her baseline.  Labs completed while in the ED showed a creatinine of 1.26, potassium 4.2, digoxin level 0.05, troponin less than 0.12.  Hemoglobin was 13.8, platelets 161.  Patient's COVID was negative.    Twelve-lead EKG today shows sinus rhythm at 67 bpm    Diagnotic studies:  Echocardiogram 10/2023: EF 60 to 65%.  Flattened septum consistent with RV pressure overload.  1+ AR, MR, PVR.  RVSP at 41 mmHg plus RAP indicative of moderate pulmonary hypertension  Labs from recent hospital visit noted above     Plan:   Paroxysmal atrial fibrillation with RVR  Patient had an episode back in 2019 when she  had a COPD exacerbation and then again in 2022 when she had a bout of gastroenteritis.  This is another episode that was not caused by an exacerbation.  She will continue her metoprolol 25 mg twice daily, and digoxin 125 mcg daily    Her [id number] score is 3 (age, gender) I think it is reasonable to retrial Eliquis at 2.5 mg twice daily.  I have ordered repeat lab work to be completed in 1 month and [place].  Our RN will fax the order to the hospital.    I have also ordered a BMP, CBC and another digoxin level.    2. HTN-stable     She will follow-up with Dr. Jama in the fall.  I offered her another visit sooner, but she respectfully declined.  I briefly discussed risk versus benefit of anticoagulation.  Should she have any issues with increased falls or bleeding I would not hesitate to change her back to aspirin therapy.    Thank you for including us in her care.    Luann Ramos, NP, APRN CNP      Today's clinic visit entailed:  Review of the result(s) of each unique test - EKG  Prescription drug management  30 minutes spent by me on the date of the encounter doing chart review, review of outside records, review of test results, patient visit, and documentation   Provider  Link to [facility] Help Grid     The level of medical decision making during this visit was of moderate complexity.      Orders Placed This Encounter   Procedures    Basic metabolic panel    Digoxin level    CBC with platelets    EKG 12-lead complete w/read - Clinics (performed today)     Orders Placed This Encounter   Medications    apixaban ANTICOAGULANT (ELIQUIS ANTICOAGULANT) 2.5 MG tablet     Sig: Take 1 tablet (2.5 mg) by mouth 2 times daily     Dispense:  60 tablet     Refill:  11     Medications Discontinued During This Encounter   Medication Reason    aspirin 81 MG EC tablet          Encounter Diagnosis   Name Primary?    Atrial fibrillation with RVR (H) Yes       CURRENT MEDICATIONS:  Current Outpatient Medications   Medication Sig  Dispense Refill    apixaban ANTICOAGULANT (ELIQUIS ANTICOAGULANT) 2.5 MG tablet Take 1 tablet (2.5 mg) by mouth 2 times daily 60 tablet 11    digoxin (LANOXIN) 125 MCG tablet Take 1 tablet (125 mcg) by mouth daily 90 tablet 4    DULERA 200-5 MCG/ACT inhaler inhale 2 puffs using inhaler twice a day      metoprolol tartrate (LOPRESSOR) 25 MG tablet Take 1 tablet (25 mg) by mouth 2 times daily 180 tablet 3    levothyroxine (SYNTHROID/LEVOTHROID) 25 MCG tablet take 1 tablet by mouth once daily, you need to have tsh rechecked at the clinic lab in 2 weeks (Patient not taking: Reported on 3/28/2024)         ALLERGIES     Allergies   Allergen Reactions    No Known Drug Allergy        PAST MEDICAL HISTORY:  Past Medical History:   Diagnosis Date    Unspecified hemorrhoids without mention of complication     Hemorrhoids    Unspecified nonsenile cataract        PAST SURGICAL HISTORY:  Past Surgical History:   Procedure Laterality Date    HC REMV CATARACT EXTRACAP,INSERT LENS, W/O ECP  2/17/2004    Right    HC REMV CATARACT EXTRACAP,INSERT LENS, W/O ECP  3/16/2004    Left    ZZC TOTAL HIP ARTHROPLASTY  01/26/10    Right       FAMILY HISTORY:  Family History   Problem Relation Age of Onset    Diabetes Brother         x2 brothers    Cancer Mother         lung cancer       SOCIAL HISTORY:  Social History     Socioeconomic History    Marital status:      Spouse name: None    Number of children: 6    Years of education: 10    Highest education level: None   Occupational History    Occupation: dish washing     Comment: [employer]   Tobacco Use    Smoking status: Every Day     Packs/day: 0.50     Years: 40.00     Additional pack years: 0.00     Total pack years: 20.00     Types: Cigarettes    Smokeless tobacco: Never   Substance and Sexual Activity    Alcohol use: Never     Comment: occ wine    Drug use: No    Sexual activity: Not Currently   Other Topics Concern     Service No    Blood Transfusions No     "Caffeine Concern No     Comment: coffee  10 cups/day    Occupational Exposure Yes     Comment: hot water at work    Hobby Hazards No    Sleep Concern No    Stress Concern No    Weight Concern No    Special Diet Yes     Comment: lo fat diet    Back Care No    Exercise Yes     Comment: walking at work     Bike Helmet No    Seat Belt Yes    Self-Exams Yes       Review of Systems:  Skin:        Eyes:  Positive for glasses  ENT:       Respiratory:  Positive for dyspnea on exertion  Cardiovascular:  Negative;palpitations;chest pain;lightheadedness;dizziness;syncope or near-syncope;edema    Gastroenterology:      Genitourinary:       Musculoskeletal:       Neurologic:  Negative numbness or tingling of feet;numbness or tingling of hands  Psychiatric:       Heme/Lymph/Imm:       Endocrine:         Physical Exam:    Vitals: /72 (BP Location: Right arm, Patient Position: Sitting, Cuff Size: Adult Regular)   Pulse 69   Resp 22   Ht 1.6 m (5' 3\")   Wt 61.5 kg (135 lb 8 oz)   LMP  (LMP Unknown)   SpO2 94%   BMI 24.00 kg/m    Constitutional: Well nourished and in no apparent distress.  Eyes: Pupils equal, round. Sclerae anicteric.   HEENT: Normocephalic, atraumatic.   Neck: Supple. No JVD   Respiratory: Lungs diminished bilaterally in the bases with rhonchi that clears with cough.  Anterior sounds are clear  Cardiovascular:  Regular rate and rhythm, normal S1 and S2. No murmur, .  Skin: Warm, dry. No rashes, cyanosis, or xanthelasma.  Extremities: No edema.  Neurologic: Unsteady gait, ambulates with assistance. Alert, awake, and oriented to person, place and time.  Psychiatric: Affect appropriate.        Recent Lab Results:  LIPID RESULTS:  Lab Results   Component Value Date    CHOL 189 05/14/2015    HDL 57 05/14/2015     05/14/2015    TRIG 120 05/14/2015    CHOLHDLRATIO 3.3 05/14/2015       LIVER ENZYME RESULTS:  Lab Results   Component Value Date    AST 36 04/02/2019    ALT 22 04/02/2019       CBC " "RESULTS:  Lab Results   Component Value Date    WBC 5.0 04/05/2019    RBC 3.74 (L) 04/05/2019    HGB 12.3 04/09/2019    HCT 31.5 (L) 04/05/2019    MCV 84 04/05/2019    MCH 27.3 04/05/2019    MCHC 32.4 04/05/2019    RDW 14.0 04/05/2019     04/09/2019       BMP RESULTS:  Lab Results   Component Value Date     10/24/2023     09/15/2020    POTASSIUM 4.7 10/24/2023    POTASSIUM 3.6 07/05/2022    POTASSIUM 4.3 09/15/2020    CHLORIDE 105 10/24/2023    CHLORIDE 113 (H) 07/05/2022    CHLORIDE 108 09/15/2020    CO2 26 10/24/2023    CO2 26 07/05/2022    CO2 30 09/15/2020    ANIONGAP 10 10/24/2023    ANIONGAP 5 07/05/2022    ANIONGAP 2 (L) 09/15/2020    GLC 99 10/24/2023    GLC 99 07/05/2022     (H) 09/15/2020    BUN 19.6 10/24/2023    BUN 14 07/05/2022    BUN 13 09/15/2020    CR 0.97 (H) 10/24/2023    CR 0.88 09/15/2020    GFRESTIMATED 55 (L) 10/24/2023    GFRESTIMATED 59 (L) 09/15/2020    GFRESTBLACK 68 09/15/2020    TONY 9.4 10/24/2023    TONY 8.9 09/15/2020        A1C RESULTS:  No results found for: \"A1C\"    INR RESULTS:  Lab Results   Component Value Date    INR 1.14 03/30/2019    INR 1.04 01/29/2010           CC  Luann N Rachel, APRN CNP  [address]  [place] [address]                "

## 2024-03-28 NOTE — PROGRESS NOTES
Lab orders have been faxed to the [facility] lab at [phone] per patient request.     Leas Shaw RN   [facility]

## 2024-03-28 NOTE — PATIENT INSTRUCTIONS
Call my nurse with any questions or concerns:  [phone]  *If you have concerns after hours, please call [phone], option 2 to speak with on call Cardiologist.    1. Medication changes from today:    Stop aspirin  Restart Eliquis 2.5 mg twice daily  IF you get nose bleeds please call us    Blood work in 1 month. You can have it done there. Please let me where we should send the order

## 2024-03-28 NOTE — LETTER
3/28/2024    Radiology  Non-[facility] Provide  No address on file    RE: Gustavo Palmer       Dear Colleague,     I had the pleasure of seeing Gustavo Palmer in the [facility].  Cardiology Clinic Progress Note  Gustavo Palmer MRN# [id number]   YOB: 1933 Age: 89 year old       HPI:    Gustavo Palmer is a delightful 89 year old patient with past medical history significant for:    COPD  Paroxysmal atrial fibrillation with RVR.  On low-dose digoxin.  Eliquis was initiated, but stopped secondary to nosebleeds.  Currently on low-dose aspirin 81 mg daily recurrence of atrial fibrillation presented to [facility] 3/10/2024 was given IV diltiazem and converted in the ED.  Gastroenteritis  Hypothyroidism  CKD  History of tobacco use    It is my pleasure meeting Ms. Palmer at today's visit.  She is a delightful 89-year-old woman well-known to Dr. Jama.  As mentioned above, she does have a history of paroxysmal atrial fibrillation.  Had not had a reoccurrence for some time.  Unfortunately, presented to the ED on 3/9/2024 with significant dizziness.  Was noted to be in A-fib with RVR.  She was given IV fluids and given 15 mg of IV diltiazem and then given IV digoxin and converted to sinus rhythm.    She had a CT angiogram which was negative for aortic dissection or aneurysm.  She was monitored overnight.  She was discharged and asked to follow back up with cardiology.  They did discuss restarting Eliquis, but patient declined.    Today she is doing well.  Feels like she is back to her baseline.  Labs completed while in the ED showed a creatinine of 1.26, potassium 4.2, digoxin level 0.05, troponin less than 0.12.  Hemoglobin was 13.8, platelets 161.  Patient's COVID was negative.    Twelve-lead EKG today shows sinus rhythm at 67 bpm    Diagnotic studies:  Echocardiogram 10/2023: EF 60 to 65%.  Flattened septum consistent with RV pressure overload.  1+ AR, MR, PVR.  RVSP at 41 mmHg  plus RAP indicative of moderate pulmonary hypertension  Labs from recent hospital visit noted above     Plan:   Paroxysmal atrial fibrillation with RVR  Patient had an episode back in 2019 when she had a COPD exacerbation and then again in 2022 when she had a bout of gastroenteritis.  This is another episode that was not caused by an exacerbation.  She will continue her metoprolol 25 mg twice daily, and digoxin 125 mcg daily    Her [id number] score is 3 (age, gender) I think it is reasonable to retrial Eliquis at 2.5 mg twice daily.  I have ordered repeat lab work to be completed in 1 month and [place].  Our RN will fax the order to the hospital.    I have also ordered a BMP, CBC and another digoxin level.    2. HTN-stable     She will follow-up with Dr. Jama in the fall.  I offered her another visit sooner, but she respectfully declined.  I briefly discussed risk versus benefit of anticoagulation.  Should she have any issues with increased falls or bleeding I would not hesitate to change her back to aspirin therapy.    Thank you for including us in her care.    Luann Ramos, NP, APRN CNP      Today's clinic visit entailed:  Review of the result(s) of each unique test - EKG  Prescription drug management  30 minutes spent by me on the date of the encounter doing chart review, review of outside records, review of test results, patient visit, and documentation   Provider  Link to [facility] Help Grid     The level of medical decision making during this visit was of moderate complexity.      Orders Placed This Encounter   Procedures    Basic metabolic panel    Digoxin level    CBC with platelets    EKG 12-lead complete w/read - Clinics (performed today)     Orders Placed This Encounter   Medications    apixaban ANTICOAGULANT (ELIQUIS ANTICOAGULANT) 2.5 MG tablet     Sig: Take 1 tablet (2.5 mg) by mouth 2 times daily     Dispense:  60 tablet     Refill:  11     Medications Discontinued During This Encounter   Medication  Reason    aspirin 81 MG EC tablet          Encounter Diagnosis   Name Primary?    Atrial fibrillation with RVR (H) Yes       CURRENT MEDICATIONS:  Current Outpatient Medications   Medication Sig Dispense Refill    apixaban ANTICOAGULANT (ELIQUIS ANTICOAGULANT) 2.5 MG tablet Take 1 tablet (2.5 mg) by mouth 2 times daily 60 tablet 11    digoxin (LANOXIN) 125 MCG tablet Take 1 tablet (125 mcg) by mouth daily 90 tablet 4    DULERA 200-5 MCG/ACT inhaler inhale 2 puffs using inhaler twice a day      metoprolol tartrate (LOPRESSOR) 25 MG tablet Take 1 tablet (25 mg) by mouth 2 times daily 180 tablet 3    levothyroxine (SYNTHROID/LEVOTHROID) 25 MCG tablet take 1 tablet by mouth once daily, you need to have tsh rechecked at the clinic lab in 2 weeks (Patient not taking: Reported on 3/28/2024)         ALLERGIES     Allergies   Allergen Reactions    No Known Drug Allergy        PAST MEDICAL HISTORY:  Past Medical History:   Diagnosis Date    Unspecified hemorrhoids without mention of complication     Hemorrhoids    Unspecified nonsenile cataract        PAST SURGICAL HISTORY:  Past Surgical History:   Procedure Laterality Date    HC REMV CATARACT EXTRACAP,INSERT LENS, W/O ECP  2/17/2004    Right    HC REMV CATARACT EXTRACAP,INSERT LENS, W/O ECP  3/16/2004    Left    ZZC TOTAL HIP ARTHROPLASTY  01/26/10    Right       FAMILY HISTORY:  Family History   Problem Relation Age of Onset    Diabetes Brother         x2 brothers    Cancer Mother         lung cancer       SOCIAL HISTORY:  Social History     Socioeconomic History    Marital status:      Spouse name: None    Number of children: 6    Years of education: 10    Highest education level: None   Occupational History    Occupation: dish washing     Comment: [employer]   Tobacco Use    Smoking status: Every Day     Packs/day: 0.50     Years: 40.00     Additional pack years: 0.00     Total pack years: 20.00     Types: Cigarettes    Smokeless tobacco: Never  "  Substance and Sexual Activity    Alcohol use: Never     Comment: occ wine    Drug use: No    Sexual activity: Not Currently   Other Topics Concern     Service No    Blood Transfusions No    Caffeine Concern No     Comment: coffee  10 cups/day    Occupational Exposure Yes     Comment: hot water at work    Hobby Hazards No    Sleep Concern No    Stress Concern No    Weight Concern No    Special Diet Yes     Comment: lo fat diet    Back Care No    Exercise Yes     Comment: walking at work     Bike Helmet No    Seat Belt Yes    Self-Exams Yes       Review of Systems:  Skin:        Eyes:  Positive for glasses  ENT:       Respiratory:  Positive for dyspnea on exertion  Cardiovascular:  Negative;palpitations;chest pain;lightheadedness;dizziness;syncope or near-syncope;edema    Gastroenterology:      Genitourinary:       Musculoskeletal:       Neurologic:  Negative numbness or tingling of feet;numbness or tingling of hands  Psychiatric:       Heme/Lymph/Imm:       Endocrine:         Physical Exam:    Vitals: /72 (BP Location: Right arm, Patient Position: Sitting, Cuff Size: Adult Regular)   Pulse 69   Resp 22   Ht 1.6 m (5' 3\")   Wt 61.5 kg (135 lb 8 oz)   LMP  (LMP Unknown)   SpO2 94%   BMI 24.00 kg/m    Constitutional: Well nourished and in no apparent distress.  Eyes: Pupils equal, round. Sclerae anicteric.   HEENT: Normocephalic, atraumatic.   Neck: Supple. No JVD   Respiratory: Lungs diminished bilaterally in the bases with rhonchi that clears with cough.  Anterior sounds are clear  Cardiovascular:  Regular rate and rhythm, normal S1 and S2. No murmur, .  Skin: Warm, dry. No rashes, cyanosis, or xanthelasma.  Extremities: No edema.  Neurologic: Unsteady gait, ambulates with assistance. Alert, awake, and oriented to person, place and time.  Psychiatric: Affect appropriate.        Recent Lab Results:  LIPID RESULTS:  Lab Results   Component Value Date    CHOL 189 05/14/2015    HDL 57 05/14/2015    " " 05/14/2015    TRIG 120 05/14/2015    CHOLHDLRATIO 3.3 05/14/2015       LIVER ENZYME RESULTS:  Lab Results   Component Value Date    AST 36 04/02/2019    ALT 22 04/02/2019       CBC RESULTS:  Lab Results   Component Value Date    WBC 5.0 04/05/2019    RBC 3.74 (L) 04/05/2019    HGB 12.3 04/09/2019    HCT 31.5 (L) 04/05/2019    MCV 84 04/05/2019    MCH 27.3 04/05/2019    MCHC 32.4 04/05/2019    RDW 14.0 04/05/2019     04/09/2019       BMP RESULTS:  Lab Results   Component Value Date     10/24/2023     09/15/2020    POTASSIUM 4.7 10/24/2023    POTASSIUM 3.6 07/05/2022    POTASSIUM 4.3 09/15/2020    CHLORIDE 105 10/24/2023    CHLORIDE 113 (H) 07/05/2022    CHLORIDE 108 09/15/2020    CO2 26 10/24/2023    CO2 26 07/05/2022    CO2 30 09/15/2020    ANIONGAP 10 10/24/2023    ANIONGAP 5 07/05/2022    ANIONGAP 2 (L) 09/15/2020    GLC 99 10/24/2023    GLC 99 07/05/2022     (H) 09/15/2020    BUN 19.6 10/24/2023    BUN 14 07/05/2022    BUN 13 09/15/2020    CR 0.97 (H) 10/24/2023    CR 0.88 09/15/2020    GFRESTIMATED 55 (L) 10/24/2023    GFRESTIMATED 59 (L) 09/15/2020    GFRESTBLACK 68 09/15/2020    TONY 9.4 10/24/2023    TONY 8.9 09/15/2020        A1C RESULTS:  No results found for: \"A1C\"    INR RESULTS:  Lab Results   Component Value Date    INR 1.14 03/30/2019    INR 1.04 01/29/2010           CC  Luann Ramos, APRN CNP  [address]  [place] [address]      Lab orders have been faxed to the [facility] lab at [phone] per patient request.     Lesa Shaw RN   [facility]    Thank you for allowing me to participate in the care of your patient.      Sincerely,     Luann Ramos NP, APRN CNP     [facility]  "

## 2024-04-10 ENCOUNTER — TRANSFERRED RECORDS (OUTPATIENT)
Dept: HEALTH INFORMATION MANAGEMENT | Facility: CLINIC | Age: 89
End: 2024-04-10
Payer: COMMERCIAL

## 2024-04-10 ENCOUNTER — TELEPHONE (OUTPATIENT)
Dept: CARDIOLOGY | Facility: CLINIC | Age: 89
End: 2024-04-10
Payer: COMMERCIAL

## 2024-04-10 NOTE — TELEPHONE ENCOUNTER
Received lab (BMP, CBC, Dig Level) results from [facility] on 4/10/24 ordered by Luann Ramos NP on 3/28/24. Results sent to scanning to be put into patient's chart. Routing to Luann Ramos NP to review results once in chart.         Luann Ramos NP LOV note from 3/28/24:  Plan:   Paroxysmal atrial fibrillation with RVR  Patient had an episode back in 2019 when she had a COPD exacerbation and then again in 2022 when she had a bout of gastroenteritis.  This is another episode that was not caused by an exacerbation.  She will continue her metoprolol 25 mg twice daily, and digoxin 125 mcg daily     Her [id number] score is 3 (age, gender) I think it is reasonable to retrial Eliquis at 2.5 mg twice daily.  I have ordered repeat lab work to be completed in 1 month and [place].  Our RN will fax the order to the hospital.     I have also ordered a BMP, CBC and another digoxin level.     2. HTN-stable      She will follow-up with Dr. Jama in the fall.  I offered her another visit sooner, but she respectfully declined.  I briefly discussed risk versus benefit of anticoagulation.  Should she have any issues with increased falls or bleeding I would not hesitate to change her back to aspirin therapy.     Thank you for including us in her care.     Luann Ramos NP, APRN CNP

## 2024-04-11 NOTE — TELEPHONE ENCOUNTER
Unable to reach patient. Left message with direct call back number if she has further questions or concerns.

## 2024-04-23 NOTE — ADDENDUM NOTE
Addended by: ARCHIE COX on: 9/5/2019 06:49 AM     Modules accepted: Orders     Augmentin 875 mg BID for 1 week taken with food   Reach out if no improvement or new or worsening concerns

## 2024-09-03 ENCOUNTER — OFFICE VISIT (OUTPATIENT)
Dept: CARDIOLOGY | Facility: CLINIC | Age: 89
End: 2024-09-03
Payer: COMMERCIAL

## 2024-09-03 VITALS
HEART RATE: 55 BPM | WEIGHT: 127 LBS | RESPIRATION RATE: 34 BRPM | HEIGHT: 63 IN | SYSTOLIC BLOOD PRESSURE: 120 MMHG | OXYGEN SATURATION: 98 % | DIASTOLIC BLOOD PRESSURE: 62 MMHG | BODY MASS INDEX: 22.5 KG/M2

## 2024-09-03 DIAGNOSIS — R06.09 DYSPNEA ON EXERTION: ICD-10-CM

## 2024-09-03 DIAGNOSIS — I10 BENIGN ESSENTIAL HYPERTENSION: ICD-10-CM

## 2024-09-03 DIAGNOSIS — I48.0 PAROXYSMAL ATRIAL FIBRILLATION (H): Primary | ICD-10-CM

## 2024-09-03 DIAGNOSIS — J43.1 PANLOBULAR EMPHYSEMA (H): ICD-10-CM

## 2024-09-03 PROCEDURE — 99214 OFFICE O/P EST MOD 30 MIN: CPT | Performed by: INTERNAL MEDICINE

## 2024-09-03 RX ORDER — BUDESONIDE AND FORMOTEROL FUMARATE DIHYDRATE 160; 4.5 UG/1; UG/1
AEROSOL RESPIRATORY (INHALATION)
COMMUNITY
Start: 2024-08-07

## 2024-09-03 RX ORDER — THYROID,PORK 15 MG
TABLET ORAL
COMMUNITY
Start: 2024-08-07

## 2024-09-03 ASSESSMENT — PAIN SCALES - GENERAL: PAINLEVEL: NO PAIN (0)

## 2024-09-03 NOTE — PROGRESS NOTES
General Cardiology Clinic Progress Note  Gustavo Palmer MRN# [id number]   YOB: 1933 Age: 89 year old       Reason for visit: Paroxysmal atrial fibrillation    History of presenting illness:    I had the opportunity to see Gustavo Palmer at [facility] today for reevaluation of paroxysmal atrial fibrillation.  She is a 89-year-old woman with a history of COPD and ongoing smoking.  She has had recurrent episodes of atrial fibrillation with rapid ventricular response over the last couple of years.  Initially she was on Eliquis but had to stop that due to nosebleeds.  After having recurrent A-fib, she is now back on lower dose Eliquis 2.5 twice daily and tolerating it well without recurrent bleeding problems.  She was hospitalized briefly in [place] this summer for hypotension and A-fib with RVR.  Apparently, she had cellulitis of her left upper arm which may have precipitated these issues.  After treatment antibiotics, she recovered well and has been feeling no symptoms from atrial fibrillation since then.    She is on digoxin 0.125 mg daily and metoprolol tartrate 25 twice daily for heart rate control.    She had an echocardiogram while she was hospitalized in [place] this summer.  Her left ventricular ejection fraction was normal at 65%.  She had moderate aortic regurgitation with mild stenosis.  Mild mitral regurgitation, moderate to severe pulmonary hypertension, and severe tricuspid regurgitation were also present.    Fortunately she seems to doing very well without any symptoms of shortness of breath, chest pain, or lower extremity edema.    On examination today her blood pressure is 120/62, heart rate 55, and weight 127 pounds.  Her lungs sound clear.  Heart rhythm is regular.  He does have a systolic and diastolic cardiac murmur.              Assessment and Plan:     ASSESSMENT:    Ms. Gustavo Palmer is a 89-year-old woman with paroxysmal atrial fibrillation and COPD.  She seems to  be doing very well now.  She is not symptomatic with her A-fib, although I am not sure if it has been present recently.  She is tolerating low-dose Eliquis for stroke prevention without bleeding problems now.  Previously she had nosebleeds on higher dose Eliquis.  I will have her continue her Eliquis and advised her to go to the emergency room if she has a nosebleed that she cannot stop.  She might require cauterization so that she can remain on anticoagulation.    I am pleased that she is doing well and I will plan to see her back again in 1 year for reevaluation.    Brice Jama MD           Orders this Visit:  Orders Placed This Encounter   Procedures    Basic metabolic panel    Follow-Up with Cardiology ABIGAIL    Follow-Up with Cardiology    Echocardiogram Complete     Orders Placed This Encounter   Medications    budesonide-formoterol (SYMBICORT) 160-4.5 MCG/ACT Inhaler     Sig: inhale 2 puffs by mouth every 12 hours rinse mouth and spit out after each use    ARMOUR THYROID 15 MG tablet     Sig: take 1 tablet by mouth once daily; follow up with lab in 6-8 weeks     There are no discontinued medications.    Today's clinic visit entailed:    35 minutes spent by me on the date of the encounter doing chart review, history and exam, documentation and further activities per the note  Provider  Link to MDM Help Grid     The level of medical decision making during this visit was of high complexity.           Review of Systems:     Review of Systems:  Skin:        Eyes:  Positive for glasses  ENT:       Respiratory:  Positive for cough;shortness of breath;dyspnea on exertion  Cardiovascular:  Negative    Gastroenterology:      Genitourinary:       Musculoskeletal:  Negative    Neurologic:  Negative    Psychiatric:       Heme/Lymph/Imm:  Positive for weight loss  Endocrine:  Positive for thyroid disorder            Physical Exam:     Vitals: /62 (BP Location: Right arm, Patient Position: Sitting, Cuff Size: Adult  "Regular)   Pulse 55   Resp (!) 34   Ht 1.6 m (5' 3\")   Wt 57.6 kg (127 lb)   LMP  (LMP Unknown)   SpO2 98%   BMI 22.50 kg/m    Constitutional: Well nourished and in no apparent distress.  Eyes: Pupils equal, round. Sclerae anicteric.   HEENT: Normocephalic, atraumatic.   Neck: Supple. JVD   Respiratory: Breathing non-labored. Lungs clear to auscultation bilaterally. No crackles, wheezes, rhonchi, or rales.  Cardiovascular:  Regular rate and rhythm, normal S1 and S2. No murmur, rub, or gallop.  Skin: Warm, dry. No rashes, cyanosis, or xanthelasma.  Extremities: No edema.  Neurologic: No gross motor deficits. Alert, awake, and oriented to person, place and time.  Psychiatric: Affect appropriate.             Medications:     Current Outpatient Medications   Medication Sig Dispense Refill    apixaban ANTICOAGULANT (ELIQUIS ANTICOAGULANT) 2.5 MG tablet Take 1 tablet (2.5 mg) by mouth 2 times daily 60 tablet 11    ARMOUR THYROID 15 MG tablet take 1 tablet by mouth once daily; follow up with lab in 6-8 weeks      budesonide-formoterol (SYMBICORT) 160-4.5 MCG/ACT Inhaler inhale 2 puffs by mouth every 12 hours rinse mouth and spit out after each use      digoxin (LANOXIN) 125 MCG tablet Take 1 tablet (125 mcg) by mouth daily 90 tablet 4    metoprolol tartrate (LOPRESSOR) 25 MG tablet Take 1 tablet (25 mg) by mouth 2 times daily 180 tablet 3    DULERA 200-5 MCG/ACT inhaler inhale 2 puffs using inhaler twice a day (Patient not taking: Reported on 9/3/2024)      levothyroxine (SYNTHROID/LEVOTHROID) 25 MCG tablet take 1 tablet by mouth once daily, you need to have tsh rechecked at the clinic lab in 2 weeks (Patient not taking: Reported on 3/28/2024)         Family History   Problem Relation Age of Onset    Diabetes Brother         x2 brothers    Cancer Mother         lung cancer       Social History     Socioeconomic History    Marital status:      Spouse name: Not on file    Number of children: 6    Years of " education: 10    Highest education level: Not on file   Occupational History    Occupation: dish washing     Comment: [employer]   Tobacco Use    Smoking status: Every Day     Current packs/day: 0.50     Average packs/day: 0.5 packs/day for 40.0 years (20.0 ttl pk-yrs)     Types: Cigarettes    Smokeless tobacco: Never   Substance and Sexual Activity    Alcohol use: Never     Comment: occ wine    Drug use: No    Sexual activity: Not Currently   Other Topics Concern     Service No    Blood Transfusions No    Caffeine Concern No     Comment: coffee  10 cups/day    Occupational Exposure Yes     Comment: hot water at work    Hobby Hazards No    Sleep Concern No    Stress Concern No    Weight Concern No    Special Diet Yes     Comment: lo fat diet    Back Care No    Exercise Yes     Comment: walking at work     Bike Helmet No    Seat Belt Yes    Self-Exams Yes    Parent/sibling w/ CABG, MI or angioplasty before 65F 55M? Not Asked   Social History Narrative    Not on file     Social Determinants of Health     Financial Resource Strain: Not on file   Food Insecurity: No Food Insecurity (7/5/2024)    Received from [facility]    Hunger Vital Sign     Worried About Running Out of Food in the Last Year: Never true     Ran Out of Food in the Last Year: Never true   Transportation Needs: No Transportation Needs (7/5/2024)    Received from [facility] and [facility]    Transportation Needs     In the past 12 months, has lack of transportation kept you from medical appointments, meetings, work, or from getting medicines or things needed for daily living?: No   Physical Activity: Not on file   Stress: Not on file   Social Connections: Not on file   Interpersonal Safety: Not At Risk (7/5/2024)    Received from [facility] and [facility]    Intimate Partner Violence     Are you in a relationship where you are physically hurt, threatened and/or made to feel afraid?: No   Housing  Stability: Low Risk  (7/5/2024)    Received from [facility]    Housing Stability Vital Sign     Unable to Pay for Housing in the Last Year: No     Number of Times Moved in the Last Year: 0     Homeless in the Last Year: No            Past Medical History:     Past Medical History:   Diagnosis Date    Unspecified hemorrhoids without mention of complication     Hemorrhoids    Unspecified nonsenile cataract               Past Surgical History:     Past Surgical History:   Procedure Laterality Date    HC REMV CATARACT EXTRACAP,INSERT LENS, W/O ECP  2/17/2004    Right    HC REMV CATARACT EXTRACAP,INSERT LENS, W/O ECP  3/16/2004    Left    ZZC TOTAL HIP ARTHROPLASTY  01/26/10    Right              Allergies:   No known drug allergy       Data:   All laboratory data reviewed:    Recent Labs   Lab Test 04/03/19  0400 04/02/19  1020 03/30/19  0927   TSH  --   --  12.69*   IRON 29*  --   --    *  --   --    IRONSAT 18  --   --    JAMES  --  402*  --        Lab Results   Component Value Date    WBC 5.0 04/05/2019    RBC 3.74 (L) 04/05/2019    HGB 12.3 04/09/2019    HCT 31.5 (L) 04/05/2019    MCV 84 04/05/2019    MCH 27.3 04/05/2019    MCHC 32.4 04/05/2019    RDW 14.0 04/05/2019     04/09/2019       Lab Results   Component Value Date     10/24/2023     09/15/2020    POTASSIUM 4.7 10/24/2023    POTASSIUM 3.6 07/05/2022    POTASSIUM 4.3 09/15/2020    CHLORIDE 105 10/24/2023    CHLORIDE 113 (H) 07/05/2022    CHLORIDE 108 09/15/2020    CO2 26 10/24/2023    CO2 26 07/05/2022    CO2 30 09/15/2020    ANIONGAP 10 10/24/2023    ANIONGAP 5 07/05/2022    ANIONGAP 2 (L) 09/15/2020    GLC 99 10/24/2023    GLC 99 07/05/2022     (H) 09/15/2020    BUN 19.6 10/24/2023    BUN 14 07/05/2022    BUN 13 09/15/2020    CR 0.97 (H) 10/24/2023    CR 0.88 09/15/2020    GFRESTIMATED 55 (L) 10/24/2023    GFRESTIMATED 59 (L) 09/15/2020    GFRESTBLACK 68 09/15/2020    TONY 9.4 10/24/2023    TONY 8.9  "09/15/2020      Lab Results   Component Value Date    AST 36 04/02/2019    ALT 22 04/02/2019       No results found for: \"A1C\"    Lab Results   Component Value Date    INR 1.14 03/30/2019    INR 1.04 01/29/2010         MCKAY HIGGINS MD  [facility]  "

## 2024-09-03 NOTE — LETTER
9/3/2024    Radiology  Non-[facility] Provide  No address on file    RE: Gustavo Palmer       Dear Colleague,     I had the pleasure of seeing Gustavo Palmer in the [facility].    General Cardiology Clinic Progress Note  Gustavo Palmer MRN# [id number]   YOB: 1933 Age: 89 year old       Reason for visit: Paroxysmal atrial fibrillation    History of presenting illness:    I had the opportunity to see Gustavo Palmer at [facility] today for reevaluation of paroxysmal atrial fibrillation.  She is a 89-year-old woman with a history of COPD and ongoing smoking.  She has had recurrent episodes of atrial fibrillation with rapid ventricular response over the last couple of years.  Initially she was on Eliquis but had to stop that due to nosebleeds.  After having recurrent A-fib, she is now back on lower dose Eliquis 2.5 twice daily and tolerating it well without recurrent bleeding problems.  She was hospitalized briefly in [place] this summer for hypotension and A-fib with RVR.  Apparently, she had cellulitis of her left upper arm which may have precipitated these issues.  After treatment antibiotics, she recovered well and has been feeling no symptoms from atrial fibrillation since then.    She is on digoxin 0.125 mg daily and metoprolol tartrate 25 twice daily for heart rate control.    She had an echocardiogram while she was hospitalized in [place] this summer.  Her left ventricular ejection fraction was normal at 65%.  She had moderate aortic regurgitation with mild stenosis.  Mild mitral regurgitation, moderate to severe pulmonary hypertension, and severe tricuspid regurgitation were also present.    Fortunately she seems to doing very well without any symptoms of shortness of breath, chest pain, or lower extremity edema.    On examination today her blood pressure is 120/62, heart rate 55, and weight 127 pounds.  Her lungs sound clear.  Heart rhythm is regular.  He does  have a systolic and diastolic cardiac murmur.              Assessment and Plan:     ASSESSMENT:    Ms. Gustavo Palmer is a 89-year-old woman with paroxysmal atrial fibrillation and COPD.  She seems to be doing very well now.  She is not symptomatic with her A-fib, although I am not sure if it has been present recently.  She is tolerating low-dose Eliquis for stroke prevention without bleeding problems now.  Previously she had nosebleeds on higher dose Eliquis.  I will have her continue her Eliquis and advised her to go to the emergency room if she has a nosebleed that she cannot stop.  She might require cauterization so that she can remain on anticoagulation.    I am pleased that she is doing well and I will plan to see her back again in 1 year for reevaluation.    Brice Jama MD           Orders this Visit:  Orders Placed This Encounter   Procedures     Basic metabolic panel     Follow-Up with Cardiology ABIGAIL     Follow-Up with Cardiology     Echocardiogram Complete     Orders Placed This Encounter   Medications     budesonide-formoterol (SYMBICORT) 160-4.5 MCG/ACT Inhaler     Sig: inhale 2 puffs by mouth every 12 hours rinse mouth and spit out after each use     ARMOUR THYROID 15 MG tablet     Sig: take 1 tablet by mouth once daily; follow up with lab in 6-8 weeks     There are no discontinued medications.    Today's clinic visit entailed:    35 minutes spent by me on the date of the encounter doing chart review, history and exam, documentation and further activities per the note  Provider  Link to [facility] Help Grid     The level of medical decision making during this visit was of high complexity.           Review of Systems:     Review of Systems:  Skin:        Eyes:  Positive for glasses  ENT:       Respiratory:  Positive for cough;shortness of breath;dyspnea on exertion  Cardiovascular:  Negative    Gastroenterology:      Genitourinary:       Musculoskeletal:  Negative    Neurologic:  Negative    Psychiatric:      "  Heme/Lymph/Imm:  Positive for weight loss  Endocrine:  Positive for thyroid disorder            Physical Exam:     Vitals: /62 (BP Location: Right arm, Patient Position: Sitting, Cuff Size: Adult Regular)   Pulse 55   Resp (!) 34   Ht 1.6 m (5' 3\")   Wt 57.6 kg (127 lb)   LMP  (LMP Unknown)   SpO2 98%   BMI 22.50 kg/m    Constitutional: Well nourished and in no apparent distress.  Eyes: Pupils equal, round. Sclerae anicteric.   HEENT: Normocephalic, atraumatic.   Neck: Supple. JVD   Respiratory: Breathing non-labored. Lungs clear to auscultation bilaterally. No crackles, wheezes, rhonchi, or rales.  Cardiovascular:  Regular rate and rhythm, normal S1 and S2. No murmur, rub, or gallop.  Skin: Warm, dry. No rashes, cyanosis, or xanthelasma.  Extremities: No edema.  Neurologic: No gross motor deficits. Alert, awake, and oriented to person, place and time.  Psychiatric: Affect appropriate.             Medications:     Current Outpatient Medications   Medication Sig Dispense Refill     apixaban ANTICOAGULANT (ELIQUIS ANTICOAGULANT) 2.5 MG tablet Take 1 tablet (2.5 mg) by mouth 2 times daily 60 tablet 11     ARMOUR THYROID 15 MG tablet take 1 tablet by mouth once daily; follow up with lab in 6-8 weeks       budesonide-formoterol (SYMBICORT) 160-4.5 MCG/ACT Inhaler inhale 2 puffs by mouth every 12 hours rinse mouth and spit out after each use       digoxin (LANOXIN) 125 MCG tablet Take 1 tablet (125 mcg) by mouth daily 90 tablet 4     metoprolol tartrate (LOPRESSOR) 25 MG tablet Take 1 tablet (25 mg) by mouth 2 times daily 180 tablet 3     DULERA 200-5 MCG/ACT inhaler inhale 2 puffs using inhaler twice a day (Patient not taking: Reported on 9/3/2024)       levothyroxine (SYNTHROID/LEVOTHROID) 25 MCG tablet take 1 tablet by mouth once daily, you need to have tsh rechecked at the clinic lab in 2 weeks (Patient not taking: Reported on 3/28/2024)         Family History   Problem Relation Age of Onset     " Diabetes Brother         x2 brothers     Cancer Mother         lung cancer       Social History     Socioeconomic History     Marital status:      Spouse name: Not on file     Number of children: 6     Years of education: 10     Highest education level: Not on file   Occupational History     Occupation: dish washing     Comment: [employer]   Tobacco Use     Smoking status: Every Day     Current packs/day: 0.50     Average packs/day: 0.5 packs/day for 40.0 years (20.0 ttl pk-yrs)     Types: Cigarettes     Smokeless tobacco: Never   Substance and Sexual Activity     Alcohol use: Never     Comment: occ wine     Drug use: No     Sexual activity: Not Currently   Other Topics Concern      Service No     Blood Transfusions No     Caffeine Concern No     Comment: coffee  10 cups/day     Occupational Exposure Yes     Comment: hot water at work     Hobby Hazards No     Sleep Concern No     Stress Concern No     Weight Concern No     Special Diet Yes     Comment: lo fat diet     Back Care No     Exercise Yes     Comment: walking at work      Bike Helmet No     Seat Belt Yes     Self-Exams Yes     Parent/sibling w/ CABG, MI or angioplasty before 65F 55M? Not Asked   Social History Narrative     Not on file     Social Determinants of Health     Financial Resource Strain: Not on file   Food Insecurity: No Food Insecurity (7/5/2024)    Received from [facility] [facility] and [facility]    Hunger Vital Sign      Worried About Running Out of Food in the Last Year: Never true      Ran Out of Food in the Last Year: Never true   Transportation Needs: No Transportation Needs (7/5/2024)    Received from [facility] and [facility]    Transportation Needs      In the past 12 months, has lack of transportation kept you from medical appointments, meetings, work, or from getting medicines or things needed for daily living?: No   Physical Activity: Not on file   Stress: Not on file   Social Connections: Not on file    Interpersonal Safety: Not At Risk (7/5/2024)    Received from [facility]    Intimate Partner Violence      Are you in a relationship where you are physically hurt, threatened and/or made to feel afraid?: No   Housing Stability: Low Risk  (7/5/2024)    [facility]    Housing Stability Vital Sign      Unable to Pay for Housing in the Last Year: No      Number of Times Moved in the Last Year: 0      Homeless in the Last Year: No            Past Medical History:     Past Medical History:   Diagnosis Date     Unspecified hemorrhoids without mention of complication     Hemorrhoids     Unspecified nonsenile cataract               Past Surgical History:     Past Surgical History:   Procedure Laterality Date     HC REMV CATARACT EXTRACAP,INSERT LENS, W/O ECP  2/17/2004    Right     HC REMV CATARACT EXTRACAP,INSERT LENS, W/O ECP  3/16/2004    Left     ZZC TOTAL HIP ARTHROPLASTY  01/26/10    Right              Allergies:   No known drug allergy       Data:   All laboratory data reviewed:    Recent Labs   Lab Test 04/03/19  0400 04/02/19  1020 03/30/19  0927   TSH  --   --  12.69*   IRON 29*  --   --    *  --   --    IRONSAT 18  --   --    JAMES  --  402*  --        Lab Results   Component Value Date    WBC 5.0 04/05/2019    RBC 3.74 (L) 04/05/2019    HGB 12.3 04/09/2019    HCT 31.5 (L) 04/05/2019    MCV 84 04/05/2019    MCH 27.3 04/05/2019    MCHC 32.4 04/05/2019    RDW 14.0 04/05/2019     04/09/2019       Lab Results   Component Value Date     10/24/2023     09/15/2020    POTASSIUM 4.7 10/24/2023    POTASSIUM 3.6 07/05/2022    POTASSIUM 4.3 09/15/2020    CHLORIDE 105 10/24/2023    CHLORIDE 113 (H) 07/05/2022    CHLORIDE 108 09/15/2020    CO2 26 10/24/2023    CO2 26 07/05/2022    CO2 30 09/15/2020    ANIONGAP 10 10/24/2023    ANIONGAP 5 07/05/2022    ANIONGAP 2 (L) 09/15/2020    GLC 99 10/24/2023    GLC 99 07/05/2022     (H) 09/15/2020     "BUN 19.6 10/24/2023    BUN 14 07/05/2022    BUN 13 09/15/2020    CR 0.97 (H) 10/24/2023    CR 0.88 09/15/2020    GFRESTIMATED 55 (L) 10/24/2023    GFRESTIMATED 59 (L) 09/15/2020    GFRESTBLACK 68 09/15/2020    TONY 9.4 10/24/2023    TONY 8.9 09/15/2020      Lab Results   Component Value Date    AST 36 04/02/2019    ALT 22 04/02/2019       No results found for: \"A1C\"    Lab Results   Component Value Date    INR 1.14 03/30/2019    INR 1.04 01/29/2010         MCKAY JAMA MD  [facility]      Thank you for allowing me to participate in the care of your patient.      Sincerely,     MCKAY JAMA MD     [facility]  cc:   Mckay Jama MD  [id number]  [place] [id number]      "

## 2025-02-26 DIAGNOSIS — I48.91 ATRIAL FIBRILLATION WITH RVR (H): ICD-10-CM

## 2025-03-13 ENCOUNTER — OFFICE VISIT (OUTPATIENT)
Dept: CARDIOLOGY | Facility: CLINIC | Age: OVER 89
End: 2025-03-13
Payer: COMMERCIAL

## 2025-03-13 VITALS
BODY MASS INDEX: 24.54 KG/M2 | OXYGEN SATURATION: 96 % | HEIGHT: 63 IN | HEART RATE: 67 BPM | WEIGHT: 138.5 LBS | SYSTOLIC BLOOD PRESSURE: 124 MMHG | RESPIRATION RATE: 16 BRPM | DIASTOLIC BLOOD PRESSURE: 76 MMHG

## 2025-03-13 DIAGNOSIS — E03.8 OTHER SPECIFIED HYPOTHYROIDISM: ICD-10-CM

## 2025-03-13 DIAGNOSIS — R06.09 DYSPNEA ON EXERTION: ICD-10-CM

## 2025-03-13 DIAGNOSIS — I10 BENIGN ESSENTIAL HYPERTENSION: ICD-10-CM

## 2025-03-13 DIAGNOSIS — I48.0 PAROXYSMAL ATRIAL FIBRILLATION (H): Primary | ICD-10-CM

## 2025-03-13 LAB
ANION GAP SERPL CALCULATED.3IONS-SCNC: 11 MMOL/L (ref 7–15)
BUN SERPL-MCNC: 23.1 MG/DL (ref 8–23)
CALCIUM SERPL-MCNC: 9.5 MG/DL (ref 8.8–10.4)
CHLORIDE SERPL-SCNC: 107 MMOL/L (ref 98–107)
CREAT SERPL-MCNC: 0.98 MG/DL (ref 0.51–0.95)
EGFRCR SERPLBLD CKD-EPI 2021: 54 ML/MIN/1.73M2
GLUCOSE SERPL-MCNC: 95 MG/DL (ref 70–99)
HCO3 SERPL-SCNC: 25 MMOL/L (ref 22–29)
POTASSIUM SERPL-SCNC: 4.7 MMOL/L (ref 3.4–5.3)
SODIUM SERPL-SCNC: 143 MMOL/L (ref 135–145)
T4 FREE SERPL-MCNC: 1.05 NG/DL (ref 0.9–1.7)
TSH SERPL DL<=0.005 MIU/L-ACNC: 4.5 UIU/ML (ref 0.3–4.2)

## 2025-03-13 ASSESSMENT — PAIN SCALES - GENERAL: PAINLEVEL_OUTOF10: NO PAIN (0)

## 2025-03-13 NOTE — LETTER
"3/13/2025    Radiology  Non-[facility] Provide  No address on file    RE: Gustavo Palmer       Dear Colleague,     I had the pleasure of seeing Gustavo Palmer in the [facility].  Cardiology Clinic Progress Note  Gustavo Palmer MRN# [id number]   YOB: 1933 Age: 89 year old       HPI:    Gustavo Palmer is a delightful 89 year old patient with past medical history significant for:    COPD  Paroxysmal atrial fibrillation with RVR.  On low-dose digoxin.  Eliquis was initiated, but stopped secondary to nosebleeds.  Currently on low-dose aspirin 81 mg daily recurrence of atrial fibrillation presented to [facility] 3/10/2024 was given IV diltiazem and converted in the ED.  Gastroenteritis  Hypothyroidism  CKD  History of tobacco use. Quit smoking 11/28/2024    Gustavo is a delightful 89-year-old woman that I am having the pleasure of seeing back today in follow-up.  She is an established patient of Dr. Jama at our [place] location.  She has a history of COPD and recently quit smoking.  Patient states that she went outside to smoke, became dizzy and fell.  She spent 2 days in the hospital and decided she no longer wanted to smoke.  She is thrilled with how much money she has in her wallet.  She has a history of A-fib on Eliquis therapy.  Last echo showed her EF at 65% with moderate aortic regurgitation and mild stenosis.  She also had severe pulmonary hypertension and severe tricuspid insufficiency.    Surprisingly denies any shortness of breath except with significant exertion.  Denies chest pain, orthopnea, PND, syncope, or edema.  Biggest concern today is hair loss.  She was having issues with palpitations, but states that she was told to take \" the small pill every day\" which I believe she is referring to her digoxin.  Patient states that previously was taking it every other day.  Digoxin level was stable in November.    Diagnotic studies:  Echocardiogram 11/29/2024 (Care " Everywhere): EF 55 to 60%.  Grade 2 diastolic dysfunction.  Mild AR, trace LA, mild MR, mild TR.  RVSP 42 mmHg plus RAP.  EKG 11/2024: Sinus rhythm, 67 bpm.  Actual tracing not available at this time.  Echocardiogram 10/2023: EF 60 to 65%.  Flattened septum consistent with RV overload.  1+ LA, 1+ MR, 1-2+ TR.  1+ AR.  RVSP was 41 mmHg plus RAP.  CXR 11/28/2024 (Care Everywhere): Low lung volumes without definite acute process.  Chronic interstitial changes noted.     Component  Ref Range & Units 3 mo ago Comments   Sodium  136 - 146 mmol/L 141    Potassium  3.5 - 5.1 mmol/L 4.1    Chloride  98 - 107 mmol/L 113 High     CO2  22 - 29 mmol/L 23    Anion Gap  10.0 - 20.0 mmol/L 9.1 Low     Creatinine  0.57 - 1.11 mg/dL 0.79    Blood Urea Nitrogen  10.0 - 20.0 mg/dL 16.8    BUN/Creatinine Ratio  11.70 - 22.90 ratio 21.27    Calcium, Total  8.6 - 10.5 mg/dL 8.4 Low     Glucose  70 - 100 mg/dL 96    eGFR  >=60 mL/min/1.73m(2) >60 Calculation based on the Chronic Kidney Disease Epidemiology Collaboration (CKD-EPI) equation refit without adjustment for race.   eCrCl (Rx) - Adults  mL/min 44.4    Resulting Agency [facility] LABORATORY [facility]    Specimen Collected: 11/30/24  4:55 AM     Component  Ref Range & Units 3 mo ago   Digoxin  0.50 - 2.00 ng/mL 1.41         Plan:   Paroxysmal atrial fibrillation.  Patient currently in sinus rhythm on exam.  On metoprolol 25 mg twice daily and digoxin 125 mcg daily.  Digoxin level completed in November, 1.4.  Patient takes her digoxin every day.  Most likely was drawn shortly after she took the dose.  No complaints of palpitations.  Pleased at how well she is feeling.  [id number] score 3 on Eliquis 2.5 mg twice daily.  Hypertension stable  Moderate pulmonary hypertension  COPD  History of long-term tobacco use.  Patient quit 11/2024.  Congratulated her today.  She feels her breathing has improved.    She will follow-up with Dr. Jama this fall.  I did order a  TSH, T4 and BMP to be drawn today.  I did not draw digoxin level since she took the medication 4 hours ago.    Thank you for including us in her care.  Luann Ramos, NP, APRN CNP      Today's clinic visit entailed:  Review of the result(s) of each unique test - labs  Ordering of each unique test  30  minutes spent by me on the date of the encounter doing chart review, review of test results, patient visit, and documentation   Provider  Link to [facility] Help Grid     The level of medical decision making during this visit was of moderate complexity.      No orders of the defined types were placed in this encounter.    No orders of the defined types were placed in this encounter.    There are no discontinued medications.      Encounter Diagnosis   Name Primary?     Paroxysmal atrial fibrillation (H)        CURRENT MEDICATIONS:  Current Outpatient Medications   Medication Sig Dispense Refill     apixaban ANTICOAGULANT (ELIQUIS ANTICOAGULANT) 2.5 MG tablet Take 1 tablet (2.5 mg) by mouth 2 times daily. 60 tablet 11     ARMOUR THYROID 15 MG tablet take 1 tablet by mouth once daily; follow up with lab in 6-8 weeks       budesonide-formoterol (SYMBICORT) 160-4.5 MCG/ACT Inhaler inhale 2 puffs by mouth every 12 hours rinse mouth and spit out after each use       digoxin (LANOXIN) 125 MCG tablet Take 1 tablet (125 mcg) by mouth daily 90 tablet 4     metoprolol tartrate (LOPRESSOR) 25 MG tablet Take 1 tablet (25 mg) by mouth 2 times daily 180 tablet 3     DULERA 200-5 MCG/ACT inhaler inhale 2 puffs using inhaler twice a day (Patient not taking: Reported on 3/13/2025)       levothyroxine (SYNTHROID/LEVOTHROID) 25 MCG tablet take 1 tablet by mouth once daily, you need to have tsh rechecked at the clinic lab in 2 weeks (Patient not taking: Reported on 3/13/2025)         ALLERGIES     Allergies   Allergen Reactions     No Known Drug Allergy        PAST MEDICAL HISTORY:  Past Medical History:   Diagnosis Date     Unspecified  hemorrhoids without mention of complication     Hemorrhoids     Unspecified nonsenile cataract        PAST SURGICAL HISTORY:  Past Surgical History:   Procedure Laterality Date     HC REMV CATARACT EXTRACAP,INSERT LENS, W/O ECP  2/17/2004    Right     HC REMV CATARACT EXTRACAP,INSERT LENS, W/O ECP  3/16/2004    Left     ZZC TOTAL HIP ARTHROPLASTY  01/26/10    Right       FAMILY HISTORY:  Family History   Problem Relation Age of Onset     Diabetes Brother         x2 brothers     Cancer Mother         lung cancer       SOCIAL HISTORY:  Social History     Socioeconomic History     Marital status:      Spouse name: None     Number of children: 6     Years of education: 10     Highest education level: None   Occupational History     Occupation: dish washing     Comment: [employer]   Tobacco Use     Smoking status: Former     Current packs/day: 0.50     Average packs/day: 0.5 packs/day for 40.0 years (20.0 ttl pk-yrs)     Types: Cigarettes     Smokeless tobacco: Never   Substance and Sexual Activity     Alcohol use: Never     Comment: occ wine     Drug use: No     Sexual activity: Not Currently   Other Topics Concern      Service No     Blood Transfusions No     Caffeine Concern No     Comment: coffee  10 cups/day     Occupational Exposure Yes     Comment: hot water at work     Hobby Hazards No     Sleep Concern No     Stress Concern No     Weight Concern No     Special Diet Yes     Comment: lo fat diet     Back Care No     Exercise Yes     Comment: walking at work      Bike Helmet No     Seat Belt Yes     Self-Exams Yes     Social Drivers of Health     Food Insecurity: No Food Insecurity (11/28/2024)    Received from [employer] and [facility]    Hunger Vital Sign      Worried About Running Out of Food in the Last Year: Never true      Ran Out of Food in the Last Year: Never true   Transportation Needs: No Transportation Needs (11/28/2024)    Received from [employer] and [facility]  "   Transportation Needs      In the past 12 months, has lack of transportation kept you from medical appointments, meetings, work, or from getting medicines or things needed for daily living?: No   Interpersonal Safety: Not At Risk (11/28/2024)    Received from [facility]    Intimate Partner Violence      Are you in a relationship where you are physically hurt, threatened and/or made to feel afraid?: No   Housing Stability: Low Risk  (11/28/2024)    Received from [facility]    Housing Stability Vital Sign      Unable to Pay for Housing in the Last Year: No      Number of Times Moved in the Last Year: 0      Homeless in the Last Year: No       Review of Systems:  Skin:        Eyes:  Positive for glasses  ENT:       Respiratory:  Negative shortness of breath, cough, wheezing  Cardiovascular:  Negative, palpitations, chest pain, edema, lightheadedness, dizziness, syncope or near-syncope    Gastroenterology:      Genitourinary:       Musculoskeletal:       Neurologic:  Negative numbness or tingling of hands, numbness or tingling of feet  Psychiatric:       Heme/Lymph/Imm:       Endocrine:         Physical Exam:    Vitals: /76 (BP Location: Right arm, Patient Position: Sitting, Cuff Size: Adult Regular)   Pulse 67   Resp 16   Ht 1.6 m (5' 3\")   Wt 62.8 kg (138 lb 8 oz)   LMP  (LMP Unknown)   SpO2 96%   BMI 24.53 kg/m    Constitutional: Well nourished and in no apparent distress.  Eyes: Pupils equal, round. Sclerae anicteric.   HEENT: Normocephalic, atraumatic.   Neck: Supple.   Respiratory: Breathing non-labored, at rest.  Lungs clear to auscultation bilaterally-diminished. No crackles, wheezes, rhonchi, or rales.  Cardiovascular:  Regular rate and rhythm, normal S1 and S2. No murmur.  Skin: Warm, dry. No rashes, cyanosis, or xanthelasma.  Extremities: No edema.  Neurologic: Slightly unsteady gait with ambulation. Alert, awake, and oriented to person, place and " "time.  Psychiatric: Affect appropriate.        Recent Lab Results:  LIPID RESULTS:  Lab Results   Component Value Date    CHOL 189 05/14/2015    HDL 57 05/14/2015     05/14/2015    TRIG 120 05/14/2015    CHOLHDLRATIO 3.3 05/14/2015       LIVER ENZYME RESULTS:  Lab Results   Component Value Date    AST 36 04/02/2019    ALT 22 04/02/2019       CBC RESULTS:  Lab Results   Component Value Date    WBC 5.0 04/05/2019    RBC 3.74 (L) 04/05/2019    HGB 12.3 04/09/2019    HCT 31.5 (L) 04/05/2019    MCV 84 04/05/2019    MCH 27.3 04/05/2019    MCHC 32.4 04/05/2019    RDW 14.0 04/05/2019     04/09/2019       BMP RESULTS:  Lab Results   Component Value Date     10/24/2023     09/15/2020    POTASSIUM 4.7 10/24/2023    POTASSIUM 3.6 07/05/2022    POTASSIUM 4.3 09/15/2020    CHLORIDE 105 10/24/2023    CHLORIDE 113 (H) 07/05/2022    CHLORIDE 108 09/15/2020    CO2 26 10/24/2023    CO2 26 07/05/2022    CO2 30 09/15/2020    ANIONGAP 10 10/24/2023    ANIONGAP 5 07/05/2022    ANIONGAP 2 (L) 09/15/2020    GLC 99 10/24/2023    GLC 99 07/05/2022     (H) 09/15/2020    BUN 19.6 10/24/2023    BUN 14 07/05/2022    BUN 13 09/15/2020    CR 0.97 (H) 10/24/2023    CR 0.88 09/15/2020    GFRESTIMATED 55 (L) 10/24/2023    GFRESTIMATED 59 (L) 09/15/2020    GFRESTBLACK 68 09/15/2020    TONY 9.4 10/24/2023    TONY 8.9 09/15/2020        A1C RESULTS:  No results found for: \"A1C\"    INR RESULTS:  Lab Results   Component Value Date    INR 1.14 03/30/2019    INR 1.04 01/29/2010           CC  Brice Jama MD  [id number]  [place] [id number]                  Thank you for allowing me to participate in the care of your patient.      Sincerely,     Luann Ramos NP, APRN CNP     [facility]  cc:   Brice Jama MD  [id number]  [place] [id number]      "

## 2025-03-13 NOTE — PATIENT INSTRUCTIONS
Call my nurse with any questions or concerns:  [phone]  *If you have concerns after hours, please call [phone], option 2 to speak with on call Cardiologist.    Lab work today   in September  Call with any concerns

## 2025-03-13 NOTE — PROGRESS NOTES
"Cardiology Clinic Progress Note  Gustavo Palmer MRN# [id number]   YOB: 1933 Age: 89 year old       HPI:    Gustavo Palmer is a delightful 89 year old patient with past medical history significant for:    COPD  Paroxysmal atrial fibrillation with RVR.  On low-dose digoxin.  Eliquis was initiated, but stopped secondary to nosebleeds.  Currently on low-dose aspirin 81 mg daily recurrence of atrial fibrillation presented to [facility] 3/10/2024 was given IV diltiazem and converted in the ED.  Gastroenteritis  Hypothyroidism  CKD  History of tobacco use. Quit smoking 11/28/2024    Gustavo is a delightful 89-year-old woman that I am having the pleasure of seeing back today in follow-up.  She is an established patient of Dr. Jama at our [place] location.  She has a history of COPD and recently quit smoking.  Patient states that she went outside to smoke, became dizzy and fell.  She spent 2 days in the hospital and decided she no longer wanted to smoke.  She is thrilled with how much money she has in her wallet.  She has a history of A-fib on Eliquis therapy.  Last echo showed her EF at 65% with moderate aortic regurgitation and mild stenosis.  She also had severe pulmonary hypertension and severe tricuspid insufficiency.    Surprisingly denies any shortness of breath except with significant exertion.  Denies chest pain, orthopnea, PND, syncope, or edema.  Biggest concern today is hair loss.  She was having issues with palpitations, but states that she was told to take \" the small pill every day\" which I believe she is referring to her digoxin.  Patient states that previously was taking it every other day.  Digoxin level was stable in November.    Diagnotic studies:  Echocardiogram 11/29/2024 (Care Everywhere): EF 55 to 60%.  Grade 2 diastolic dysfunction.  Mild AR, trace IL, mild MR, mild TR.  RVSP 42 mmHg plus RAP.  EKG 11/2024: Sinus rhythm, 67 bpm.  Actual tracing not available at this " time.  Echocardiogram 10/2023: EF 60 to 65%.  Flattened septum consistent with RV overload.  1+ MI, 1+ MR, 1-2+ TR.  1+ AR.  RVSP was 41 mmHg plus RAP.  CXR 11/28/2024 (Care Everywhere): Low lung volumes without definite acute process.  Chronic interstitial changes noted.     Component  Ref Range & Units 3 mo ago Comments   Sodium  136 - 146 mmol/L 141    Potassium  3.5 - 5.1 mmol/L 4.1    Chloride  98 - 107 mmol/L 113 High     CO2  22 - 29 mmol/L 23    Anion Gap  10.0 - 20.0 mmol/L 9.1 Low     Creatinine  0.57 - 1.11 mg/dL 0.79    Blood Urea Nitrogen  10.0 - 20.0 mg/dL 16.8    BUN/Creatinine Ratio  11.70 - 22.90 ratio 21.27    Calcium, Total  8.6 - 10.5 mg/dL 8.4 Low     Glucose  70 - 100 mg/dL 96    eGFR  >=60 mL/min/1.73m(2) >60 Calculation based on the Chronic Kidney Disease Epidemiology Collaboration (CKD-EPI) equation refit without adjustment for race.   eCrCl (Rx) - Adults  mL/min 44.4    Resulting Agency [facility] LABORATORY [facility]    Specimen Collected: 11/30/24  4:55 AM     Component  Ref Range & Units 3 mo ago   Digoxin  0.50 - 2.00 ng/mL 1.41         Plan:   Paroxysmal atrial fibrillation.  Patient currently in sinus rhythm on exam.  On metoprolol 25 mg twice daily and digoxin 125 mcg daily.  Digoxin level completed in November, 1.4.  Patient takes her digoxin every day.  Most likely was drawn shortly after she took the dose.  No complaints of palpitations.  Pleased at how well she is feeling.  [id number] score 3 on Eliquis 2.5 mg twice daily.  Hypertension stable  Moderate pulmonary hypertension  COPD  History of long-term tobacco use.  Patient quit 11/2024.  Congratulated her today.  She feels her breathing has improved.    She will follow-up with Dr. Jama this fall.  I did order a TSH, T4 and BMP to be drawn today.  I did not draw digoxin level since she took the medication 4 hours ago.    Thank you for including us in her care.  Luann Ramos, NP, APRN CNP      Today's clinic  visit entailed:  Review of the result(s) of each unique test - labs  Ordering of each unique test  30  minutes spent by me on the date of the encounter doing chart review, review of test results, patient visit, and documentation   Provider  Link to [facility] Help Grid     The level of medical decision making during this visit was of moderate complexity.      No orders of the defined types were placed in this encounter.    No orders of the defined types were placed in this encounter.    There are no discontinued medications.      Encounter Diagnosis   Name Primary?    Paroxysmal atrial fibrillation (H)        CURRENT MEDICATIONS:  Current Outpatient Medications   Medication Sig Dispense Refill    apixaban ANTICOAGULANT (ELIQUIS ANTICOAGULANT) 2.5 MG tablet Take 1 tablet (2.5 mg) by mouth 2 times daily. 60 tablet 11    ARMOUR THYROID 15 MG tablet take 1 tablet by mouth once daily; follow up with lab in 6-8 weeks      budesonide-formoterol (SYMBICORT) 160-4.5 MCG/ACT Inhaler inhale 2 puffs by mouth every 12 hours rinse mouth and spit out after each use      digoxin (LANOXIN) 125 MCG tablet Take 1 tablet (125 mcg) by mouth daily 90 tablet 4    metoprolol tartrate (LOPRESSOR) 25 MG tablet Take 1 tablet (25 mg) by mouth 2 times daily 180 tablet 3    DULERA 200-5 MCG/ACT inhaler inhale 2 puffs using inhaler twice a day (Patient not taking: Reported on 3/13/2025)      levothyroxine (SYNTHROID/LEVOTHROID) 25 MCG tablet take 1 tablet by mouth once daily, you need to have tsh rechecked at the clinic lab in 2 weeks (Patient not taking: Reported on 3/13/2025)         ALLERGIES     Allergies   Allergen Reactions    No Known Drug Allergy        PAST MEDICAL HISTORY:  Past Medical History:   Diagnosis Date    Unspecified hemorrhoids without mention of complication     Hemorrhoids    Unspecified nonsenile cataract        PAST SURGICAL HISTORY:  Past Surgical History:   Procedure Laterality Date    HC REMV CATARACT EXTRACAP,INSERT LENS,  W/O ECP  2/17/2004    Right    HC REMV CATARACT EXTRACAP,INSERT LENS, W/O ECP  3/16/2004    Left    ZZC TOTAL HIP ARTHROPLASTY  01/26/10    Right       FAMILY HISTORY:  Family History   Problem Relation Age of Onset    Diabetes Brother         x2 brothers    Cancer Mother         lung cancer       SOCIAL HISTORY:  Social History     Socioeconomic History    Marital status:      Spouse name: None    Number of children: 6    Years of education: 10    Highest education level: None   Occupational History    Occupation: dish washing     Comment: [employer]   Tobacco Use    Smoking status: Former     Current packs/day: 0.50     Average packs/day: 0.5 packs/day for 40.0 years (20.0 ttl pk-yrs)     Types: Cigarettes    Smokeless tobacco: Never   Substance and Sexual Activity    Alcohol use: Never     Comment: occ wine    Drug use: No    Sexual activity: Not Currently   Other Topics Concern     Service No    Blood Transfusions No    Caffeine Concern No     Comment: coffee  10 cups/day    Occupational Exposure Yes     Comment: hot water at work    Hobby Hazards No    Sleep Concern No    Stress Concern No    Weight Concern No    Special Diet Yes     Comment: lo fat diet    Back Care No    Exercise Yes     Comment: walking at work     Bike Helmet No    Seat Belt Yes    Self-Exams Yes     Social Drivers of Health     Food Insecurity: No Food Insecurity (11/28/2024)    Received from [employer] [facility]    Hunger Vital Sign     Worried About Running Out of Food in the Last Year: Never true     Ran Out of Food in the Last Year: Never true   Transportation Needs: No Transportation Needs (11/28/2024)    Received from [employer] and [facility]    Transportation Needs     In the past 12 months, has lack of transportation kept you from medical appointments, meetings, work, or from getting medicines or things needed for daily living?: No   Interpersonal Safety: Not At Risk (11/28/2024)     "Received from [facility]    Intimate Partner Violence     Are you in a relationship where you are physically hurt, threatened and/or made to feel afraid?: No   Housing Stability: Low Risk  (11/28/2024)    Received from [facility]    Housing Stability Vital Sign     Unable to Pay for Housing in the Last Year: No     Number of Times Moved in the Last Year: 0     Homeless in the Last Year: No       Review of Systems:  Skin:        Eyes:  Positive for glasses  ENT:       Respiratory:  Negative shortness of breath, cough, wheezing  Cardiovascular:  Negative, palpitations, chest pain, edema, lightheadedness, dizziness, syncope or near-syncope    Gastroenterology:      Genitourinary:       Musculoskeletal:       Neurologic:  Negative numbness or tingling of hands, numbness or tingling of feet  Psychiatric:       Heme/Lymph/Imm:       Endocrine:         Physical Exam:    Vitals: /76 (BP Location: Right arm, Patient Position: Sitting, Cuff Size: Adult Regular)   Pulse 67   Resp 16   Ht 1.6 m (5' 3\")   Wt 62.8 kg (138 lb 8 oz)   LMP  (LMP Unknown)   SpO2 96%   BMI 24.53 kg/m    Constitutional: Well nourished and in no apparent distress.  Eyes: Pupils equal, round. Sclerae anicteric.   HEENT: Normocephalic, atraumatic.   Neck: Supple.   Respiratory: Breathing non-labored, at rest.  Lungs clear to auscultation bilaterally-diminished. No crackles, wheezes, rhonchi, or rales.  Cardiovascular:  Regular rate and rhythm, normal S1 and S2. No murmur.  Skin: Warm, dry. No rashes, cyanosis, or xanthelasma.  Extremities: No edema.  Neurologic: Slightly unsteady gait with ambulation. Alert, awake, and oriented to person, place and time.  Psychiatric: Affect appropriate.        Recent Lab Results:  LIPID RESULTS:  Lab Results   Component Value Date    CHOL 189 05/14/2015    HDL 57 05/14/2015     05/14/2015    TRIG 120 05/14/2015    CHOLHDLRATIO 3.3 05/14/2015       LIVER " "ENZYME RESULTS:  Lab Results   Component Value Date    AST 36 04/02/2019    ALT 22 04/02/2019       CBC RESULTS:  Lab Results   Component Value Date    WBC 5.0 04/05/2019    RBC 3.74 (L) 04/05/2019    HGB 12.3 04/09/2019    HCT 31.5 (L) 04/05/2019    MCV 84 04/05/2019    MCH 27.3 04/05/2019    MCHC 32.4 04/05/2019    RDW 14.0 04/05/2019     04/09/2019       BMP RESULTS:  Lab Results   Component Value Date     10/24/2023     09/15/2020    POTASSIUM 4.7 10/24/2023    POTASSIUM 3.6 07/05/2022    POTASSIUM 4.3 09/15/2020    CHLORIDE 105 10/24/2023    CHLORIDE 113 (H) 07/05/2022    CHLORIDE 108 09/15/2020    CO2 26 10/24/2023    CO2 26 07/05/2022    CO2 30 09/15/2020    ANIONGAP 10 10/24/2023    ANIONGAP 5 07/05/2022    ANIONGAP 2 (L) 09/15/2020    GLC 99 10/24/2023    GLC 99 07/05/2022     (H) 09/15/2020    BUN 19.6 10/24/2023    BUN 14 07/05/2022    BUN 13 09/15/2020    CR 0.97 (H) 10/24/2023    CR 0.88 09/15/2020    GFRESTIMATED 55 (L) 10/24/2023    GFRESTIMATED 59 (L) 09/15/2020    GFRESTBLACK 68 09/15/2020    TONY 9.4 10/24/2023    TONY 8.9 09/15/2020        A1C RESULTS:  No results found for: \"A1C\"    INR RESULTS:  Lab Results   Component Value Date    INR 1.14 03/30/2019    INR 1.04 01/29/2010           CC  Brice Jama MD  [address]  [place] [id number]                "

## 2025-03-20 ENCOUNTER — TELEPHONE (OUTPATIENT)
Dept: ANTICOAGULATION | Facility: CLINIC | Age: OVER 89
End: 2025-03-20
Payer: COMMERCIAL

## 2025-03-20 NOTE — TELEPHONE ENCOUNTER
ANTICOAGULATION DIRECT ORAL ANTICOAGULANT MONITORING    SUBJECTIVE     The [facility] is evaluating Gustavo Palmer's Apixaban (Eliquis) as part of its Anticoagulation Monitoring Program.    Indication:Atrial Fibrillation  Current dose per medication list: Apixaban 2.5 mg TWICE daily  Recent hospitalizations/ED/Office Visits for bleeding/clotting concerns: Yes: Office Visit on 3/13/25   Other bleeding or side effect concerns: No  Additional findings: CKD stage 3, CAD, Pulmonary HTN visit note from 3/13/25 mentions a fall, unclear when this fall happened.    OBJECTIVE     Age: 89 year old    Adherence score:0 as of     Wt Readings from Last 2 Encounters:   03/13/25 62.8 kg (138 lb 8 oz)   09/03/24 57.6 kg (127 lb)      Lab Results   Component Value Date    CR 0.98 (H) 03/13/2025    CR 0.97 (H) 10/24/2023    CR 0.84 07/05/2022     Creatinine Clearance (using actual bodyweight, mL/min):     Lab Results   Component Value Date    HGB 12.3 04/09/2019     04/09/2019       ASSESSMENT/PLAN     A chart review for Direct Oral Anticoagulant (DOAC) Stewardship has been completed for:     Dosing: provider documented clinical decision for non- package insert dosing noted: patient on low dose of Eliquis 2.5 mg BID due to nosebleeds.Eliquis 2.5 mg twice daily and tolerating it well without recurent bleeding problems. She is toleratin low-dose Eliquis for stroke prevention without bleeding problems now.  Patient will continue Eliquis and advised her to go to the emergency room if she has a nosebleed she cannot stop.   Current antiplatelet therapy: Yes: ASA 81 mg daily    Plan made per [facility] anticoagulation protocol    Aretha Adamson, RN  Anticoagulation Clinic

## 2025-09-03 ENCOUNTER — HOSPITAL ENCOUNTER (OUTPATIENT)
Dept: CARDIOLOGY | Facility: CLINIC | Age: OVER 89
Discharge: HOME | End: 2025-09-03
Attending: INTERNAL MEDICINE
Payer: COMMERCIAL

## 2025-09-03 DIAGNOSIS — I48.0 PAROXYSMAL ATRIAL FIBRILLATION (H): ICD-10-CM

## 2025-09-03 LAB — LVEF ECHO: NORMAL

## 2025-09-03 PROCEDURE — 93306 TTE W/DOPPLER COMPLETE: CPT | Mod: 26 | Performed by: INTERNAL MEDICINE

## 2025-09-03 PROCEDURE — 93306 TTE W/DOPPLER COMPLETE: CPT
